# Patient Record
Sex: MALE | Race: BLACK OR AFRICAN AMERICAN
[De-identification: names, ages, dates, MRNs, and addresses within clinical notes are randomized per-mention and may not be internally consistent; named-entity substitution may affect disease eponyms.]

---

## 2018-04-10 ENCOUNTER — HOSPITAL ENCOUNTER (EMERGENCY)
Dept: HOSPITAL 62 - ER | Age: 38
Discharge: ANOTHER HOSPITAL | End: 2018-04-10
Payer: MEDICARE

## 2018-04-10 VITALS — DIASTOLIC BLOOD PRESSURE: 89 MMHG | SYSTOLIC BLOOD PRESSURE: 142 MMHG

## 2018-04-10 DIAGNOSIS — S42.021A: ICD-10-CM

## 2018-04-10 DIAGNOSIS — S22.41XA: ICD-10-CM

## 2018-04-10 DIAGNOSIS — J45.901: ICD-10-CM

## 2018-04-10 DIAGNOSIS — J93.9: Primary | ICD-10-CM
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
## 2018-04-10 DIAGNOSIS — F41.0: ICD-10-CM

## 2018-04-10 DIAGNOSIS — S27.321A: ICD-10-CM

## 2018-04-10 LAB
ADD MANUAL DIFF: NO
ALBUMIN SERPL-MCNC: 4 G/DL (ref 3.5–5)
ALP SERPL-CCNC: 49 U/L (ref 38–126)
ALT SERPL-CCNC: 28 U/L (ref 21–72)
ANION GAP SERPL CALC-SCNC: 10 MMOL/L (ref 5–19)
APPEARANCE UR: CLEAR
APTT PPP: (no result) S
ARTERIAL BLOOD FIO2: (no result)
ARTERIAL BLOOD H2CO3: 1.58 MMOL/L (ref 1.05–1.35)
ARTERIAL BLOOD HCO3: 28.4 MMOL/L (ref 20–26)
ARTERIAL BLOOD PCO2: 52.4 MMHG (ref 35–45)
ARTERIAL BLOOD PH: 7.35 (ref 7.35–7.45)
ARTERIAL BLOOD PO2: 59.2 MMHG (ref 80–100)
ARTERIAL BLOOD TOTAL CO2: 30 MMOL/L (ref 23–27)
AST SERPL-CCNC: 28 U/L (ref 17–59)
BASE EXCESS BLDA CALC-SCNC: 2 MMOL/L
BASOPHILS # BLD AUTO: 0.1 10^3/UL (ref 0–0.2)
BASOPHILS NFR BLD AUTO: 0.7 % (ref 0–2)
BILIRUB DIRECT SERPL-MCNC: 0.1 MG/DL (ref 0–0.4)
BILIRUB SERPL-MCNC: 0.1 MG/DL (ref 0.2–1.3)
BILIRUB UR QL STRIP: NEGATIVE
BUN SERPL-MCNC: 18 MG/DL (ref 7–20)
CALCIUM: 8.4 MG/DL (ref 8.4–10.2)
CHLORIDE SERPL-SCNC: 104 MMOL/L (ref 98–107)
CO2 SERPL-SCNC: 28 MMOL/L (ref 22–30)
EOSINOPHIL # BLD AUTO: 1.3 10^3/UL (ref 0–0.6)
EOSINOPHIL NFR BLD AUTO: 13.3 % (ref 0–6)
ERYTHROCYTE [DISTWIDTH] IN BLOOD BY AUTOMATED COUNT: 14.4 % (ref 11.5–14)
GLUCOSE SERPL-MCNC: 134 MG/DL (ref 75–110)
GLUCOSE UR STRIP-MCNC: NEGATIVE MG/DL
HCT VFR BLD CALC: 37.3 % (ref 37.9–51)
HGB BLD-MCNC: 12 G/DL (ref 13.5–17)
KETONES UR STRIP-MCNC: NEGATIVE MG/DL
LYMPHOCYTES # BLD AUTO: 3.6 10^3/UL (ref 0.5–4.7)
LYMPHOCYTES NFR BLD AUTO: 36.1 % (ref 13–45)
MCH RBC QN AUTO: 27 PG (ref 27–33.4)
MCHC RBC AUTO-ENTMCNC: 32.1 G/DL (ref 32–36)
MCV RBC AUTO: 84 FL (ref 80–97)
MONOCYTES # BLD AUTO: 0.8 10^3/UL (ref 0.1–1.4)
MONOCYTES NFR BLD AUTO: 8.1 % (ref 3–13)
NEUTROPHILS # BLD AUTO: 4.1 10^3/UL (ref 1.7–8.2)
NEUTS SEG NFR BLD AUTO: 41.8 % (ref 42–78)
NITRITE UR QL STRIP: NEGATIVE
PH UR STRIP: 5 [PH] (ref 5–9)
PLATELET # BLD: 234 10^3/UL (ref 150–450)
POTASSIUM SERPL-SCNC: 3.8 MMOL/L (ref 3.6–5)
PROT SERPL-MCNC: 6.5 G/DL (ref 6.3–8.2)
PROT UR STRIP-MCNC: NEGATIVE MG/DL
RBC # BLD AUTO: 4.44 10^6/UL (ref 4.35–5.55)
SAO2 % BLDA: 89.1 % (ref 94–98)
SODIUM SERPL-SCNC: 142.1 MMOL/L (ref 137–145)
SP GR UR STRIP: 1.02
TOTAL CELLS COUNTED % (AUTO): 100 %
UROBILINOGEN UR-MCNC: NEGATIVE MG/DL (ref ?–2)
WBC # BLD AUTO: 9.8 10^3/UL (ref 4–10.5)

## 2018-04-10 PROCEDURE — 0W9900Z DRAINAGE OF RIGHT PLEURAL CAVITY WITH DRAINAGE DEVICE, OPEN APPROACH: ICD-10-PCS | Performed by: INTERNAL MEDICINE

## 2018-04-10 PROCEDURE — 99291 CRITICAL CARE FIRST HOUR: CPT

## 2018-04-10 PROCEDURE — 80053 COMPREHEN METABOLIC PANEL: CPT

## 2018-04-10 PROCEDURE — 82803 BLOOD GASES ANY COMBINATION: CPT

## 2018-04-10 PROCEDURE — 85025 COMPLETE CBC W/AUTO DIFF WBC: CPT

## 2018-04-10 PROCEDURE — 70486 CT MAXILLOFACIAL W/O DYE: CPT

## 2018-04-10 PROCEDURE — 96375 TX/PRO/DX INJ NEW DRUG ADDON: CPT

## 2018-04-10 PROCEDURE — 96376 TX/PRO/DX INJ SAME DRUG ADON: CPT

## 2018-04-10 PROCEDURE — 94640 AIRWAY INHALATION TREATMENT: CPT

## 2018-04-10 PROCEDURE — 71260 CT THORAX DX C+: CPT

## 2018-04-10 PROCEDURE — 93010 ELECTROCARDIOGRAM REPORT: CPT

## 2018-04-10 PROCEDURE — 81001 URINALYSIS AUTO W/SCOPE: CPT

## 2018-04-10 PROCEDURE — 36600 WITHDRAWAL OF ARTERIAL BLOOD: CPT

## 2018-04-10 PROCEDURE — 72125 CT NECK SPINE W/O DYE: CPT

## 2018-04-10 PROCEDURE — 70450 CT HEAD/BRAIN W/O DYE: CPT

## 2018-04-10 PROCEDURE — 71045 X-RAY EXAM CHEST 1 VIEW: CPT

## 2018-04-10 PROCEDURE — 93005 ELECTROCARDIOGRAM TRACING: CPT

## 2018-04-10 PROCEDURE — 32551 INSERTION OF CHEST TUBE: CPT

## 2018-04-10 PROCEDURE — 74177 CT ABD & PELVIS W/CONTRAST: CPT

## 2018-04-10 PROCEDURE — 80307 DRUG TEST PRSMV CHEM ANLYZR: CPT

## 2018-04-10 PROCEDURE — 96361 HYDRATE IV INFUSION ADD-ON: CPT

## 2018-04-10 PROCEDURE — 96374 THER/PROPH/DIAG INJ IV PUSH: CPT

## 2018-04-10 PROCEDURE — 36415 COLL VENOUS BLD VENIPUNCTURE: CPT
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
## 2018-04-10 NOTE — RADIOLOGY REPORT (SQ)
EXAM DESCRIPTION:  CHEST SINGLE VIEW



COMPLETED DATE/TIME:  4/10/2018 7:52 pm



REASON FOR STUDY:  difficulty breathing



COMPARISON:  None.



EXAM PARAMETERS:  NUMBER OF VIEWS: One view.

TECHNIQUE: Single frontal radiographic view of the chest acquired.

RADIATION DOSE: NA

LIMITATIONS: None.



FINDINGS:  LUNGS AND PLEURA: Right-sided pneumothorax, approximately 30%.  Left lung clear.

MEDIASTINUM AND HILAR STRUCTURES: No masses.  Contour normal.

HEART AND VASCULAR STRUCTURES: Heart normal in size.  Normal vasculature.

BONES: Fracture of the right clavicle.

HARDWARE: None in the chest.

OTHER: No other significant finding.



IMPRESSION:  RIGHT-SIDED PNEUMOTHORAX.  FRACTURE OF THE RIGHT CLAVICLE.



COMMENT:   Pertinent findings on the imaging study reported as a CRITICAL RESULT to MICHELLE OTOOLE MD at20:12 on 4/10/2018.  The finding had been recognized and a chest tube inserted.

Category of Critical Result: Pneumothorax.



TECHNICAL DOCUMENTATION:  JOB ID:  [id number]

 2011 [employer]- All Rights Reserved



Reading location - IP/workstation name: [email or web address]
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
## 2018-04-10 NOTE — RADIOLOGY REPORT (SQ)
EXAM DESCRIPTION:  CHEST SINGLE VIEW



COMPLETED DATE/TIME:  4/10/2018 8:13 pm



REASON FOR STUDY:  CHEST TUBE PLACEMENT



COMPARISON:  4/10/2018.



EXAM PARAMETERS:  NUMBER OF VIEWS: One view.

TECHNIQUE: Single frontal radiographic view of the chest acquired.

RADIATION DOSE: NA

LIMITATIONS: None.



FINDINGS:  LUNGS AND PLEURA: Interval placement of right-sided chest tube with almost complete resolu
tion of the pneumothorax.  Minimal trace apical pneumothorax.  Left lung clear.

MEDIASTINUM AND HILAR STRUCTURES: No masses.  Contour normal.

HEART AND VASCULAR STRUCTURES: Heart normal in size.  Normal vasculature.

BONES: Fracture right clavicle.

HARDWARE: Right-sided chest tube.

OTHER: No other significant finding.



IMPRESSION:  INTERVAL PLACEMENT OF RIGHT-SIDED CHEST TUBE WITH ALMOST COMPLETE RESOLUTION OF THE PNEU
MOTHORAX.  MINIMAL TRACE APICAL PNEUMOTHORAX REMAINS.



TECHNICAL DOCUMENTATION:  JOB ID:  [id number]

 2011 [employer]- All Rights Reserved



Reading location - IP/workstation name: [email or web address]
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
## 2018-04-11 NOTE — EKG REPORT
SEVERITY:- ABNORMAL ECG -

SINUS RHYTHM

PROBABLE LEFT VENTRICULAR HYPERTROPHY

:

Confirmed by: Yosi Chance MD 11-Apr-2018 07:33:12

## 2018-04-16 ENCOUNTER — HOSPITAL ENCOUNTER (EMERGENCY)
Dept: HOSPITAL 62 - ER | Age: 38
Discharge: HOME | End: 2018-04-16
Payer: MEDICARE

## 2018-04-16 VITALS — SYSTOLIC BLOOD PRESSURE: 135 MMHG | DIASTOLIC BLOOD PRESSURE: 82 MMHG

## 2018-04-16 DIAGNOSIS — R06.02: ICD-10-CM

## 2018-04-16 DIAGNOSIS — Z86.14: ICD-10-CM

## 2018-04-16 DIAGNOSIS — J98.11: Primary | ICD-10-CM

## 2018-04-16 DIAGNOSIS — R06.00: ICD-10-CM

## 2018-04-16 DIAGNOSIS — Z98.890: ICD-10-CM

## 2018-04-16 LAB
ADD MANUAL DIFF: NO
ANION GAP SERPL CALC-SCNC: 10 MMOL/L (ref 5–19)
BASOPHILS # BLD AUTO: 0 10^3/UL (ref 0–0.2)
BASOPHILS NFR BLD AUTO: 0.6 % (ref 0–2)
BUN SERPL-MCNC: 20 MG/DL (ref 7–20)
CALCIUM: 9.6 MG/DL (ref 8.4–10.2)
CHLORIDE SERPL-SCNC: 99 MMOL/L (ref 98–107)
CO2 SERPL-SCNC: 33 MMOL/L (ref 22–30)
EOSINOPHIL # BLD AUTO: 0.9 10^3/UL (ref 0–0.6)
EOSINOPHIL NFR BLD AUTO: 15 % (ref 0–6)
ERYTHROCYTE [DISTWIDTH] IN BLOOD BY AUTOMATED COUNT: 14.3 % (ref 11.5–14)
GLUCOSE SERPL-MCNC: 127 MG/DL (ref 75–110)
HCT VFR BLD CALC: 37.9 % (ref 37.9–51)
HGB BLD-MCNC: 12.4 G/DL (ref 13.5–17)
LYMPHOCYTES # BLD AUTO: 1 10^3/UL (ref 0.5–4.7)
LYMPHOCYTES NFR BLD AUTO: 16.1 % (ref 13–45)
MCH RBC QN AUTO: 26.9 PG (ref 27–33.4)
MCHC RBC AUTO-ENTMCNC: 32.6 G/DL (ref 32–36)
MCV RBC AUTO: 83 FL (ref 80–97)
MONOCYTES # BLD AUTO: 0.4 10^3/UL (ref 0.1–1.4)
MONOCYTES NFR BLD AUTO: 6.9 % (ref 3–13)
NEUTROPHILS # BLD AUTO: 3.8 10^3/UL (ref 1.7–8.2)
NEUTS SEG NFR BLD AUTO: 61.4 % (ref 42–78)
PLATELET # BLD: 358 10^3/UL (ref 150–450)
POTASSIUM SERPL-SCNC: 4.3 MMOL/L (ref 3.6–5)
RBC # BLD AUTO: 4.59 10^6/UL (ref 4.35–5.55)
SODIUM SERPL-SCNC: 141.7 MMOL/L (ref 137–145)
TOTAL CELLS COUNTED % (AUTO): 100 %
WBC # BLD AUTO: 6.1 10^3/UL (ref 4–10.5)

## 2018-04-16 PROCEDURE — 99284 EMERGENCY DEPT VISIT MOD MDM: CPT

## 2018-04-16 PROCEDURE — 85025 COMPLETE CBC W/AUTO DIFF WBC: CPT

## 2018-04-16 PROCEDURE — 71046 X-RAY EXAM CHEST 2 VIEWS: CPT

## 2018-04-16 PROCEDURE — 96374 THER/PROPH/DIAG INJ IV PUSH: CPT

## 2018-04-16 PROCEDURE — 94640 AIRWAY INHALATION TREATMENT: CPT

## 2018-04-16 PROCEDURE — 80048 BASIC METABOLIC PNL TOTAL CA: CPT

## 2018-04-16 PROCEDURE — 71260 CT THORAX DX C+: CPT

## 2018-04-16 PROCEDURE — 36415 COLL VENOUS BLD VENIPUNCTURE: CPT

## 2018-04-16 PROCEDURE — 96372 THER/PROPH/DIAG INJ SC/IM: CPT

## 2018-04-16 NOTE — RADIOLOGY REPORT (SQ)
EXAM DESCRIPTION:  CHEST 2 VIEWS



COMPLETED DATE/TIME:  4/16/2018 1:43 pm



REASON FOR STUDY:  cp/recent chest tube



COMPARISON:  Two-view chest 7/5/2013, 6/22/2013



EXAM PARAMETERS:  NUMBER OF VIEWS: two views

TECHNIQUE: Digital Frontal and Lateral radiographic views of the chest acquired.

RADIATION DOSE: NA

LIMITATIONS: none



FINDINGS:  LUNGS AND PLEURA: There is right lower lobe partial collapse with volume loss and consolid
ation, elevation of the right hemidiaphragm.

Right middle lobe, right upper lobe clear.  Left lung clear.

No pneumothorax

MEDIASTINUM AND HILAR STRUCTURES: No masses or contour abnormalities.

HEART AND VASCULAR STRUCTURES: Heart normal size.  No evidence for failure.

BONES: No acute findings.

HARDWARE: None in the chest.

OTHER: No other significant finding.



IMPRESSION:  Subtotal collapse of the right lower lobe.  Pneumonia could not be excluded.  Endobronch
ial lesion to the left lower lobe could not be excluded.



TECHNICAL DOCUMENTATION:  JOB ID:  [id number]

 2011 [employer]- All Rights Reserved



Reading location - IP/workstation name: [email or web address]
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
## 2018-06-07 ENCOUNTER — HOSPITAL ENCOUNTER (EMERGENCY)
Dept: HOSPITAL 62 - ER | Age: 38
LOS: 1 days | Discharge: HOME | End: 2018-06-08
Payer: MEDICARE

## 2018-06-07 DIAGNOSIS — R06.02: ICD-10-CM

## 2018-06-07 DIAGNOSIS — J45.41: Primary | ICD-10-CM

## 2018-06-07 DIAGNOSIS — R05: ICD-10-CM

## 2018-06-07 PROCEDURE — 71045 X-RAY EXAM CHEST 1 VIEW: CPT

## 2018-06-07 PROCEDURE — 96365 THER/PROPH/DIAG IV INF INIT: CPT

## 2018-06-07 PROCEDURE — 94640 AIRWAY INHALATION TREATMENT: CPT

## 2018-06-07 PROCEDURE — 99285 EMERGENCY DEPT VISIT HI MDM: CPT

## 2018-06-07 PROCEDURE — 96375 TX/PRO/DX INJ NEW DRUG ADDON: CPT

## 2018-06-07 NOTE — RADIOLOGY REPORT (SQ)
EXAM DESCRIPTION:

XR CHEST 1 VIEW



CLINICAL HISTORY:

37 years Male, sob



COMPARISON:

CT,4.16.18.



NUMBER OF VIEWS/TECHNIQUE:

1/AP



FINDINGS: Increased lung volume, clear parenchyma, normal cardiac

silhouette, and chronic fracture of the right mid clavicle and

right upper posterolateral ribs.



IMPRESSION:



No acute cardiopulmonary findings.
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
## 2018-06-08 VITALS — DIASTOLIC BLOOD PRESSURE: 82 MMHG | SYSTOLIC BLOOD PRESSURE: 156 MMHG

## 2018-06-30 ENCOUNTER — HOSPITAL ENCOUNTER (INPATIENT)
Dept: HOSPITAL 62 - ER | Age: 38
LOS: 1 days | Discharge: HOME | DRG: 202 | End: 2018-07-01
Attending: INTERNAL MEDICINE | Admitting: INTERNAL MEDICINE
Payer: MEDICARE

## 2018-06-30 DIAGNOSIS — Z87.81: ICD-10-CM

## 2018-06-30 DIAGNOSIS — J96.02: ICD-10-CM

## 2018-06-30 DIAGNOSIS — J96.01: ICD-10-CM

## 2018-06-30 DIAGNOSIS — E87.2: ICD-10-CM

## 2018-06-30 DIAGNOSIS — J45.21: Primary | ICD-10-CM

## 2018-06-30 DIAGNOSIS — Z87.828: ICD-10-CM

## 2018-06-30 DIAGNOSIS — F41.9: ICD-10-CM

## 2018-06-30 DIAGNOSIS — Z82.49: ICD-10-CM

## 2018-06-30 LAB
ADD MANUAL DIFF: NO
ALBUMIN SERPL-MCNC: 4.5 G/DL (ref 3.5–5)
ALP SERPL-CCNC: 88 U/L (ref 38–126)
ALT SERPL-CCNC: 26 U/L (ref 21–72)
ANION GAP SERPL CALC-SCNC: 15 MMOL/L (ref 5–19)
AST SERPL-CCNC: 26 U/L (ref 17–59)
BASE EXCESS BLDV CALC-SCNC: -1.8 MMOL/L
BASE EXCESS BLDV CALC-SCNC: 2 MMOL/L
BASOPHILS # BLD AUTO: 0.1 10^3/UL (ref 0–0.2)
BASOPHILS NFR BLD AUTO: 1.1 % (ref 0–2)
BILIRUB DIRECT SERPL-MCNC: 0.3 MG/DL (ref 0–0.4)
BILIRUB SERPL-MCNC: 0.3 MG/DL (ref 0.2–1.3)
BUN SERPL-MCNC: 16 MG/DL (ref 7–20)
CALCIUM: 9.5 MG/DL (ref 8.4–10.2)
CHLORIDE SERPL-SCNC: 105 MMOL/L (ref 98–107)
CK SERPL-CCNC: 296 U/L (ref 55–170)
CO2 SERPL-SCNC: 30 MMOL/L (ref 22–30)
EOSINOPHIL # BLD AUTO: 1 10^3/UL (ref 0–0.6)
EOSINOPHIL NFR BLD AUTO: 9.4 % (ref 0–6)
ERYTHROCYTE [DISTWIDTH] IN BLOOD BY AUTOMATED COUNT: 15.9 % (ref 11.5–14)
GLUCOSE SERPL-MCNC: 136 MG/DL (ref 75–110)
HCO3 BLDV-SCNC: 29.5 MMOL/L (ref 20–32)
HCO3 BLDV-SCNC: 30.8 MMOL/L (ref 20–32)
HCT VFR BLD CALC: 40.1 % (ref 37.9–51)
HGB BLD-MCNC: 12.8 G/DL (ref 13.5–17)
LYMPHOCYTES # BLD AUTO: 3.2 10^3/UL (ref 0.5–4.7)
LYMPHOCYTES NFR BLD AUTO: 29.5 % (ref 13–45)
MCH RBC QN AUTO: 26.8 PG (ref 27–33.4)
MCHC RBC AUTO-ENTMCNC: 32.1 G/DL (ref 32–36)
MCV RBC AUTO: 84 FL (ref 80–97)
MONOCYTES # BLD AUTO: 0.7 10^3/UL (ref 0.1–1.4)
MONOCYTES NFR BLD AUTO: 6.8 % (ref 3–13)
NEUTROPHILS # BLD AUTO: 5.7 10^3/UL (ref 1.7–8.2)
NEUTS SEG NFR BLD AUTO: 53.2 % (ref 42–78)
PCO2 BLDV: 101.9 MMHG (ref 35–63)
PCO2 BLDV: 59.9 MMHG (ref 35–63)
PH BLDV: 7.1 [PH] (ref 7.3–7.42)
PH BLDV: 7.31 [PH] (ref 7.3–7.42)
PLATELET # BLD: 353 10^3/UL (ref 150–450)
POTASSIUM SERPL-SCNC: 4.1 MMOL/L (ref 3.6–5)
PROT SERPL-MCNC: 7.7 G/DL (ref 6.3–8.2)
RBC # BLD AUTO: 4.79 10^6/UL (ref 4.35–5.55)
SODIUM SERPL-SCNC: 150 MMOL/L (ref 137–145)
TOTAL CELLS COUNTED % (AUTO): 100 %
WBC # BLD AUTO: 10.7 10^3/UL (ref 4–10.5)

## 2018-06-30 PROCEDURE — 82962 GLUCOSE BLOOD TEST: CPT

## 2018-06-30 PROCEDURE — 82550 ASSAY OF CK (CPK): CPT

## 2018-06-30 PROCEDURE — 94660 CPAP INITIATION&MGMT: CPT

## 2018-06-30 PROCEDURE — 99291 CRITICAL CARE FIRST HOUR: CPT

## 2018-06-30 PROCEDURE — 71045 X-RAY EXAM CHEST 1 VIEW: CPT

## 2018-06-30 PROCEDURE — 93010 ELECTROCARDIOGRAM REPORT: CPT

## 2018-06-30 PROCEDURE — 36415 COLL VENOUS BLD VENIPUNCTURE: CPT

## 2018-06-30 PROCEDURE — 5A09457 ASSISTANCE WITH RESPIRATORY VENTILATION, 24-96 CONSECUTIVE HOURS, CONTINUOUS POSITIVE AIRWAY PRESSURE: ICD-10-PCS | Performed by: EMERGENCY MEDICINE

## 2018-06-30 PROCEDURE — 80053 COMPREHEN METABOLIC PANEL: CPT

## 2018-06-30 PROCEDURE — 82803 BLOOD GASES ANY COMBINATION: CPT

## 2018-06-30 PROCEDURE — 93005 ELECTROCARDIOGRAM TRACING: CPT

## 2018-06-30 PROCEDURE — 85025 COMPLETE CBC W/AUTO DIFF WBC: CPT

## 2018-06-30 PROCEDURE — 84484 ASSAY OF TROPONIN QUANT: CPT

## 2018-06-30 RX ADMIN — OXYCODONE AND ACETAMINOPHEN PRN TAB: 5; 325 TABLET ORAL at 23:57

## 2018-06-30 RX ADMIN — ACETAMINOPHEN PRN MG: 325 TABLET ORAL at 15:55

## 2018-06-30 RX ADMIN — METHYLPREDNISOLONE SODIUM SUCCINATE SCH MG: 125 INJECTION, POWDER, FOR SOLUTION INTRAMUSCULAR; INTRAVENOUS at 17:30

## 2018-06-30 RX ADMIN — ACETAMINOPHEN PRN MG: 325 TABLET ORAL at 11:16

## 2018-06-30 RX ADMIN — OXYCODONE HYDROCHLORIDE PRN MG: 5 TABLET ORAL at 17:32

## 2018-06-30 RX ADMIN — IPRATROPIUM BROMIDE AND ALBUTEROL SULFATE SCH ML: 2.5; .5 SOLUTION RESPIRATORY (INHALATION) at 07:48

## 2018-06-30 RX ADMIN — IPRATROPIUM BROMIDE AND ALBUTEROL SULFATE SCH ML: 2.5; .5 SOLUTION RESPIRATORY (INHALATION) at 19:51

## 2018-06-30 RX ADMIN — ENOXAPARIN SODIUM SCH MG: 40 INJECTION SUBCUTANEOUS at 11:13

## 2018-06-30 RX ADMIN — OXYCODONE HYDROCHLORIDE PRN MG: 5 TABLET ORAL at 23:57

## 2018-06-30 RX ADMIN — MAGNESIUM SULFATE IN DEXTROSE SCH ML: 10 INJECTION, SOLUTION INTRAVENOUS at 09:46

## 2018-06-30 RX ADMIN — IPRATROPIUM BROMIDE AND ALBUTEROL SULFATE SCH ML: 2.5; .5 SOLUTION RESPIRATORY (INHALATION) at 15:33

## 2018-06-30 RX ADMIN — IPRATROPIUM BROMIDE AND ALBUTEROL SULFATE SCH ML: 2.5; .5 SOLUTION RESPIRATORY (INHALATION) at 11:30

## 2018-06-30 RX ADMIN — LANSOPRAZOLE SCH MG: 30 TABLET, ORALLY DISINTEGRATING, DELAYED RELEASE ORAL at 11:13

## 2018-06-30 RX ADMIN — OXYCODONE AND ACETAMINOPHEN PRN TAB: 5; 325 TABLET ORAL at 17:30

## 2018-06-30 RX ADMIN — MAGNESIUM SULFATE IN DEXTROSE SCH ML: 10 INJECTION, SOLUTION INTRAVENOUS at 09:47
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
## 2018-06-30 NOTE — RADIOLOGY REPORT (SQ)
EXAM DESCRIPTION: 



XR CHEST 1 VIEW



COMPLETED DATE/TME:  06/30/2018 04:38



CLINICAL HISTORY: SOB



COMPARISON: 4/10/2018



FINDINGS: Single frontal view of the chest.  The

cardiomediastinal silhouette has normal size and contour. No

consolidation, pneumothorax, or pleural effusion.  Redemonstrated

mid right clavicular fracture. Leads overlie the chest. Upper

abdominal soft tissues are unremarkable.



IMPRESSION:



1. No acute pulmonary process identified.

## 2018-07-01 VITALS — DIASTOLIC BLOOD PRESSURE: 74 MMHG | SYSTOLIC BLOOD PRESSURE: 127 MMHG

## 2018-07-01 RX ADMIN — METHYLPREDNISOLONE SODIUM SUCCINATE SCH MG: 125 INJECTION, POWDER, FOR SOLUTION INTRAMUSCULAR; INTRAVENOUS at 02:41

## 2018-07-01 RX ADMIN — ENOXAPARIN SODIUM SCH MG: 40 INJECTION SUBCUTANEOUS at 10:47

## 2018-07-01 RX ADMIN — IPRATROPIUM BROMIDE AND ALBUTEROL SULFATE SCH ML: 2.5; .5 SOLUTION RESPIRATORY (INHALATION) at 00:21

## 2018-07-01 RX ADMIN — IPRATROPIUM BROMIDE AND ALBUTEROL SULFATE SCH ML: 2.5; .5 SOLUTION RESPIRATORY (INHALATION) at 04:35

## 2018-07-01 RX ADMIN — IPRATROPIUM BROMIDE AND ALBUTEROL SULFATE SCH ML: 2.5; .5 SOLUTION RESPIRATORY (INHALATION) at 08:02

## 2018-07-01 RX ADMIN — OXYCODONE HYDROCHLORIDE PRN MG: 5 TABLET ORAL at 08:47

## 2018-07-01 RX ADMIN — LANSOPRAZOLE SCH MG: 30 TABLET, ORALLY DISINTEGRATING, DELAYED RELEASE ORAL at 05:42
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
## 2018-08-22 ENCOUNTER — HOSPITAL ENCOUNTER (EMERGENCY)
Dept: HOSPITAL 62 - ER | Age: 38
Discharge: HOME | End: 2018-08-22
Payer: MEDICARE

## 2018-08-22 VITALS — DIASTOLIC BLOOD PRESSURE: 84 MMHG | SYSTOLIC BLOOD PRESSURE: 152 MMHG

## 2018-08-22 DIAGNOSIS — J45.21: Primary | ICD-10-CM

## 2018-08-22 DIAGNOSIS — Z87.891: ICD-10-CM

## 2018-08-22 LAB
ADD MANUAL DIFF: NO
ANION GAP SERPL CALC-SCNC: 10 MMOL/L (ref 5–19)
BASOPHILS # BLD AUTO: 0.1 10^3/UL (ref 0–0.2)
BASOPHILS NFR BLD AUTO: 1.1 % (ref 0–2)
BUN SERPL-MCNC: 16 MG/DL (ref 7–20)
CALCIUM: 9 MG/DL (ref 8.4–10.2)
CHLORIDE SERPL-SCNC: 108 MMOL/L (ref 98–107)
CO2 SERPL-SCNC: 28 MMOL/L (ref 22–30)
EOSINOPHIL # BLD AUTO: 1.3 10^3/UL (ref 0–0.6)
EOSINOPHIL NFR BLD AUTO: 19.7 % (ref 0–6)
ERYTHROCYTE [DISTWIDTH] IN BLOOD BY AUTOMATED COUNT: 15.3 % (ref 11.5–14)
GLUCOSE SERPL-MCNC: 111 MG/DL (ref 75–110)
HCT VFR BLD CALC: 37.5 % (ref 37.9–51)
HGB BLD-MCNC: 12.2 G/DL (ref 13.5–17)
LYMPHOCYTES # BLD AUTO: 1.5 10^3/UL (ref 0.5–4.7)
LYMPHOCYTES NFR BLD AUTO: 23.3 % (ref 13–45)
MCH RBC QN AUTO: 26.4 PG (ref 27–33.4)
MCHC RBC AUTO-ENTMCNC: 32.6 G/DL (ref 32–36)
MCV RBC AUTO: 81 FL (ref 80–97)
MONOCYTES # BLD AUTO: 0.5 10^3/UL (ref 0.1–1.4)
MONOCYTES NFR BLD AUTO: 7.2 % (ref 3–13)
NEUTROPHILS # BLD AUTO: 3.2 10^3/UL (ref 1.7–8.2)
NEUTS SEG NFR BLD AUTO: 48.7 % (ref 42–78)
PLATELET # BLD: 286 10^3/UL (ref 150–450)
POTASSIUM SERPL-SCNC: 3.8 MMOL/L (ref 3.6–5)
RBC # BLD AUTO: 4.62 10^6/UL (ref 4.35–5.55)
SODIUM SERPL-SCNC: 145.8 MMOL/L (ref 137–145)
TOTAL CELLS COUNTED % (AUTO): 100 %
WBC # BLD AUTO: 6.6 10^3/UL (ref 4–10.5)

## 2018-08-22 PROCEDURE — 96366 THER/PROPH/DIAG IV INF ADDON: CPT

## 2018-08-22 PROCEDURE — 36415 COLL VENOUS BLD VENIPUNCTURE: CPT

## 2018-08-22 PROCEDURE — 94640 AIRWAY INHALATION TREATMENT: CPT

## 2018-08-22 PROCEDURE — 96375 TX/PRO/DX INJ NEW DRUG ADDON: CPT

## 2018-08-22 PROCEDURE — 96365 THER/PROPH/DIAG IV INF INIT: CPT

## 2018-08-22 PROCEDURE — 85025 COMPLETE CBC W/AUTO DIFF WBC: CPT

## 2018-08-22 PROCEDURE — 99285 EMERGENCY DEPT VISIT HI MDM: CPT

## 2018-08-22 PROCEDURE — 80048 BASIC METABOLIC PNL TOTAL CA: CPT

## 2018-08-22 PROCEDURE — 71045 X-RAY EXAM CHEST 1 VIEW: CPT

## 2018-08-22 RX ADMIN — MAGNESIUM SULFATE IN DEXTROSE SCH MLS/HR: 10 INJECTION, SOLUTION INTRAVENOUS at 01:16

## 2018-08-22 RX ADMIN — MAGNESIUM SULFATE IN DEXTROSE SCH MLS/HR: 10 INJECTION, SOLUTION INTRAVENOUS at 02:15

## 2018-08-22 NOTE — ER DOCUMENT REPORT
ED Medical Screen (RME)





- General


Chief Complaint: Asthma Exacerbation


Stated Complaint: ASTHMA


Time Seen by Provider: 08/22/18 01:06


Mode of Arrival: Wheelchair


Information source: Patient, Relative


Notes: 





Patient is a 29-year-old male who presents with chief complaint of acute asthma 

exacerbation.  Patient reports this is been going on several days, rescue 

inhalers have not helped.  Patient speaking in short choppy sentences.  Patient 

upgraded to SAE 2.





Exam:


Inspiratory and expiratory wheezing throughout, diminished air movement.





I have greeted and performed a rapid initial assessment of this patient.  A 

comprehensive ED assessment and evaluation of the patient, analysis of test 

results and completion of the medical decision making process will be conducted 

by additional ED providers.  Dictation of this chart was performed using voice 

recognition software; therefore, there may be some unintended grammatical 

errors.


TRAVEL OUTSIDE OF THE [place] IN LAST 30 DAYS: No





- Related Data


Allergies/Adverse Reactions: 


 





tramadol Allergy (Verified 04/16/18 13:08)


 











Past Medical History


Pulmonary Medical History: Reports: Hx Asthma


   Denies: Hx Bronchitis, Hx COPD, Hx Pneumonia, Hx Intubation, Hx Respiratory 

Failure, Hx Sleep Apnea, Hx Tuberculosis


Neurological Medical History: Denies: Hx Cerebrovascular Accident, Hx Migraine, 

Hx Seizures


Endocrine Medical History: Denies: Hx Diabetes Mellitus Type 1, Hx Diabetes 

Mellitus Type 2, Hx Graves' Disease, Hx Hyperthyroidism, Hx Hypothyroidism


Renal/ Medical History: Denies: Hx Peritoneal Dialysis


Psychiatric Medical History: Reports: Hx Bipolar Disorder


Infectious Medical History: Reports: Hx MRSA


Past Surgical History: Reports: Hx Abdominal Surgery - umbilical hernia repair, 

Hx Orthopedic Surgery - R clavical; tibia





- Immunizations


Hx Diphtheria, Pertussis, Tetanus Vaccination: Yes


History of Influenza Vaccine for 10/2017 - 3/2018 Season: No

## 2018-08-22 NOTE — RADIOLOGY REPORT (SQ)
EXAM DESCRIPTION: 



XR CHEST 1 VIEW



COMPLETED DATE/TME:  08/22/2018 01:14



CLINICAL HISTORY: dyspnea



COMPARISON: 6/7/2018



FINDINGS: Single frontal view of the chest.  The

cardiomediastinal silhouette has normal size and contour. No

consolidation, pneumothorax, or pleural effusion.  No displaced

rib fractures identified. Leads overlie the chest. Upper

abdominal soft tissues are unremarkable.



IMPRESSION:



1. No acute pulmonary process identified.

## 2018-08-22 NOTE — ER DOCUMENT REPORT
ED General





- General


Chief Complaint: Asthma Exacerbation


Stated Complaint: ASTHMA


Time Seen by Provider: 08/22/18 01:06


Mode of Arrival: Wheelchair


Notes: 





Patient is a 37-year-old male who presents with complaint of asthma attack.  He 

has a history of severe asthma.  Has been using inhalers at home today but they 

have not been helping.  He has been intubated a few times in the past.  He is a 

former smoker but quit several years ago.  No fevers.  He says he does have 

some pain in the right side of his lung with breathing.  No history of PE or 

DVT.  No other complaints at this time.  No other medical problems.


TRAVEL OUTSIDE OF THE [place] IN LAST 30 DAYS: No





- Related Data


Allergies/Adverse Reactions: 


 





tramadol Allergy (Verified 04/16/18 13:08)


 











Past Medical History





- General


Information source: Patient, Relative





- Social History


Smoking Status: Former Smoker


Frequency of alcohol use: None


Drug Abuse: None


Family History: Hypertension, None, Reviewed & Not Pertinent


Pulmonary Medical History: Reports: Hx Asthma


   Denies: Hx Bronchitis, Hx COPD, Hx Pneumonia, Hx Intubation, Hx Respiratory 

Failure, Hx Sleep Apnea, Hx Tuberculosis


Neurological Medical History: Denies: Hx Cerebrovascular Accident, Hx Migraine, 

Hx Seizures


Endocrine Medical History: Denies: Hx Diabetes Mellitus Type 1, Hx Diabetes 

Mellitus Type 2, Hx Graves' Disease, Hx Hyperthyroidism, Hx Hypothyroidism


Renal/ Medical History: Denies: Hx Peritoneal Dialysis


Psychiatric Medical History: Reports: Hx Bipolar Disorder


Infectious Medical History: Reports: Hx MRSA


Past Surgical History: Reports: Hx Abdominal Surgery - umbilical hernia repair, 

Hx Orthopedic Surgery - R clavical; tibia





- Immunizations


Hx Diphtheria, Pertussis, Tetanus Vaccination: Yes


Hx Pneumococcal Vaccination: 10/01/11





Review of Systems





- Review of Systems


Notes: 





My Normal Review Basic





REVIEW OF SYSTEMS:


CONSTITUTIONAL :  Denies fever,  chills, or sweats.  Denies recent illness.


EENT:   Denies eye, ear, throat, or mouth pain or symptoms.  Denies nasal or 

sinus congestion.


CARDIOVASCULAR:  Denies chest pain.


RESPIRATORY: Difficulty to breathing.


GASTROINTESTINAL:  Denies abdominal pain.  Denies nausea, vomiting, or diarrhea.


GENITOURINARY:  Denies difficulty urinating, painful urination, burning, 

frequency, or blood in urine.


SKIN:   Denies rash or skin lesions.


NEUROLOGICAL:  Denies altered mental status or loss of consciousness.  Denies 

headache.  Denies weakness or paralysis or loss of use of either side.  Denies 

problems with gait or speech.  Denies sensory or motor loss.


ALL OTHER SYSTEMS REVIEWED AND NEGATIVE.





Physical Exam





- Vital signs


Vitals: 


 











Temp Pulse Resp BP Pulse Ox


 


 98.1 F   88   12   139/88 H  93 


 


 08/22/18 01:06  08/22/18 01:06  08/22/18 01:06  08/22/18 01:06  08/22/18 01:06














- Notes


Notes: 





General Appearance: Well nourished, alert, cooperative, moderate acute distress

, no obvious discomfort.


Vitals: reviewed, See vital signs table.


Head: no swelling or tenderness to the head


Eyes: PERRL, EOMI, Conjuctiva clear


Mouth: No decreasd moisture


Throat: No tonsillar inflammation, No airway obstruction,  No lymphadenopathy


Neck: Supple, no neck tenderness, No thyromegaly


Lungs:Diffuse wheezing with poor to fair air movement.  Some accessory muscle 

use.  Speaks in 2 word sentences.


Heart: Normal rate, Regular rythm, No murmur, no rub


Abdomen: Normal BS, soft, No rigidity, No abdominal tenderness, No guarding, no 

rebound,


Extremities: strength 5/5 in all extremities, good pulses in all extremities, 

no swelling or tenderness in the extremities, no edema.


Skin: warm, dry, appropriate color, no rash


Neuro: speech clear, oriented x 3, normal affect, responds appropriately to 

questions.





Course





- Re-evaluation


Re-evalutation: 





08/22/18 02:00


On reassessment patient's work of breathing is much improved however his lung 

fields are still very tight wheezing.  I will give him more breathing 

treatments and then reassess.


08/22/18 02:32








Patient is now moving air much better.  He still has a prolonged expiratory 

wheeze; however, his air movement is much improved.  We will continue to 

monitor the patient to make sure he fully improves.


08/22/18 05:39








Patient requested to leave because he has to work early morning.  I offered to 

write him a work note.  I informed him that for him to stay Nancy bit longer 

make sure his lung fields clear.  I informed him that his chance believes now 

that he could go home and his breathing to get worse and he could quickly crash 

being that he has just severe history of asthma.  Patient shows understanding 

of this but says he feels very well and request to be discharged home.  He is 

currently speaking full sentences.  His lung fields to continue to clear.  He 

has good air movement.  He still has some wheezing.  His oxygen saturation is 90

-93%.  Patient said this is actually his baseline.  I informed him that that 

does not really make sense that he would have a baseline oxygen saturation of 90

-93%; however, patient is adamant that this is his baseline oxygenation.  

Patient shows understanding that if he does go home he could potentially have 

worsening difficulty breathing and could quickly crash and require intubation 

or ventilator.  Patient is aware of this but still request to be discharged 

home.  Patient shows capacity to make his own decisions.  Patient will be 

discharged as he requests.





Dictation of this chart was performed using voice recognition software; 

therefore, there may be some unintended grammatical errors.





- Vital Signs


Vital signs: 


 











Temp Pulse Resp BP Pulse Ox


 


 98.1 F   88   12   152/84 H  94 


 


 08/22/18 01:06  08/22/18 01:06  08/22/18 03:01  08/22/18 03:01  08/22/18 03:01














- Laboratory


Result Diagrams: 


 08/22/18 01:18





 08/22/18 01:18


Laboratory results interpreted by me: 


 











  08/22/18 08/22/18





  01:18 01:18


 


Hgb  12.2 L 


 


Hct  37.5 L 


 


MCH  26.4 L 


 


RDW  15.3 H 


 


Eosinophils %  19.7 H 


 


Absolute Eosinophils  1.3 H 


 


Sodium   145.8 H


 


Chloride   108 H


 


Glucose   111 H














Discharge





- Discharge


Clinical Impression: 


Asthma exacerbation


Qualifiers:


 Asthma severity: unspecified severity Asthma persistence: intermittent 

Qualified Code(s): J45.21 - Mild intermittent asthma with (acute) exacerbation





Condition: Stable


Disposition: HOME, SELF-CARE


Additional Instructions: 


ASTHMA:





     You have been diagnosed as having asthma.  This is a condition where there 

is episodic tightness in the bronchial tubes.  Allergies, infections, and 

polluted or cold air may be contributing factors.


     Emergency treatment of a severe asthma attack may include adrenaline shots

, or bronchodilator aerosol.  You may feel lightheaded, have a decreased 

exercise tolerance and a rapid pulse for an hour or two.  Rest and get plenty 

of fluids.


     Home treatment of asthma requires bronchodilator drugs.  These can be 

administered by injection, inhalation, or by mouth.  Antibiotics and 

corticosteroids may be required for some patients.  You should avoid chemical 

fumes, dusts, pollens, and exercising in very cold or dry air. If you smoke, 

stop!!


     If you develop a fever, increased wheezing, chest pain, or severe 

shortness of breath, you should contact the doctor immediately.








STEROID MEDICATION:


     You have been given an injection of or oral medicine of the cortisone/

steroid class.  This medication is used to control inflammation or allergy.  

Vinod t is usually only given for a short period of time, until the acute process 

subsides.


     There are usually no side effects from short-term use of cortisone-like 

medications.  Some persons feel an increased sense of well-being and are not 

sleepy at bedtime.  Long-term use of cortisone medications is best avoided, 

unless required for a severe condition.  If your condition does not remit, or 

relapses after the course of corticosteroid medication, you should consult your 

physician.








INHALED BRONCHODILATORS:


     You have received treatment(s) of and/or prescription for an inhaled 

bronchodilator -- a medication which stimulates the airways in the lung to 

dilate.  This improves the flow of air in asthma, bronchitis, and emphysema.


     These medicines have some similarity to adrenaline, and can cause similar 

side effects:  shakiness, racing heart, and a sense of nervousness.  These side 

effects decrease with time.  Contact your doctor if these side effects are 

severe.


     Do not over-use the medicine.  Too-frequent use of the inhaler may make it 

ineffective.  Call your doctor if the inhaler is not controlling your symptoms 

at the prescribed doses.











FOLLOW-UP CARE:


If you have been referred to a physician for follow-up care, call the physician

s office for an appointment as you were instructed or within the next two days.

  If you experience worsening or a significant change in your symptoms, notify 

the physician immediately or return to the Emergency Department at any time for 

re-evaluation.





Please return to the ER immediately if you develop fevers, difficulty breathing

, wheezing not responding to your inhaler, or if you feel unwell. Do not take 

the Motrin if you are having an asthma exacerbation.  As discussed with you, we 

recommend staying a little bit longer to make sure you lungs do clear out well 

before leaving.  We understand that you have to work in the morning and that 

you do not want to stay.  We respect your decision to want to leave but 

strongly encourage you to return to the ER anytime if you have any worsening 

breathing whatsoever as we want what is best for you and we want you to do well.


Prescriptions: 


Ibuprofen [Motrin 400 mg Tablet] 400 mg PO Q6 PRN #20 tablet


 PRN Reason: Pain Scale Of 2


Ipratropium/Albuterol Sulfate [Duoneb 3 ml Ampul] 3 ml NEB RTQ4HP PRN #30 

vial.neb


 PRN Reason: 


Ipratropium/Albuterol Sulfate [Combivent Inhaler] 1 puff IH BID #1 aer.w.adap


Prednisone [Deltasone 20 mg Tablet] 3 tab PO DAILY 4 Days  tablet

## 2018-10-15 ENCOUNTER — HOSPITAL ENCOUNTER (INPATIENT)
Dept: HOSPITAL 62 - ER | Age: 38
LOS: 5 days | Discharge: ANOTHER HOSPITAL | DRG: 166 | End: 2018-10-20
Attending: INTERNAL MEDICINE | Admitting: INTERNAL MEDICINE
Payer: MEDICARE

## 2018-10-15 DIAGNOSIS — F31.9: ICD-10-CM

## 2018-10-15 DIAGNOSIS — F19.90: ICD-10-CM

## 2018-10-15 DIAGNOSIS — I10: ICD-10-CM

## 2018-10-15 DIAGNOSIS — Z78.1: ICD-10-CM

## 2018-10-15 DIAGNOSIS — J96.01: Primary | ICD-10-CM

## 2018-10-15 DIAGNOSIS — Z86.14: ICD-10-CM

## 2018-10-15 DIAGNOSIS — T81.82XA: ICD-10-CM

## 2018-10-15 DIAGNOSIS — Z82.49: ICD-10-CM

## 2018-10-15 DIAGNOSIS — J96.02: ICD-10-CM

## 2018-10-15 DIAGNOSIS — J45.51: ICD-10-CM

## 2018-10-15 DIAGNOSIS — J95.811: ICD-10-CM

## 2018-10-15 DIAGNOSIS — Z88.8: ICD-10-CM

## 2018-10-15 DIAGNOSIS — J18.9: ICD-10-CM

## 2018-10-15 DIAGNOSIS — F17.200: ICD-10-CM

## 2018-10-15 DIAGNOSIS — R00.0: ICD-10-CM

## 2018-10-15 LAB
ADD MANUAL DIFF: NO
ALBUMIN SERPL-MCNC: 3.9 G/DL (ref 3.5–5)
ALP SERPL-CCNC: 70 U/L (ref 38–126)
ALT SERPL-CCNC: 22 U/L (ref 21–72)
ANION GAP SERPL CALC-SCNC: 8 MMOL/L (ref 5–19)
APPEARANCE UR: (no result)
APTT PPP: YELLOW S
AST SERPL-CCNC: 25 U/L (ref 17–59)
BARBITURATES UR QL SCN: NEGATIVE
BASOPHILS # BLD AUTO: 0.1 10^3/UL (ref 0–0.2)
BASOPHILS NFR BLD AUTO: 1.1 % (ref 0–2)
BILIRUB DIRECT SERPL-MCNC: 0.2 MG/DL (ref 0–0.4)
BILIRUB SERPL-MCNC: 0.3 MG/DL (ref 0.2–1.3)
BILIRUB UR QL STRIP: NEGATIVE
BUN SERPL-MCNC: 12 MG/DL (ref 7–20)
CALCIUM: 8.8 MG/DL (ref 8.4–10.2)
CHLORIDE SERPL-SCNC: 106 MMOL/L (ref 98–107)
CO2 SERPL-SCNC: 28 MMOL/L (ref 22–30)
EOSINOPHIL # BLD AUTO: 0.9 10^3/UL (ref 0–0.6)
EOSINOPHIL NFR BLD AUTO: 11.1 % (ref 0–6)
ERYTHROCYTE [DISTWIDTH] IN BLOOD BY AUTOMATED COUNT: 15.6 % (ref 11.5–14)
GLUCOSE SERPL-MCNC: 155 MG/DL (ref 75–110)
GLUCOSE UR STRIP-MCNC: NEGATIVE MG/DL
HCT VFR BLD CALC: 36.5 % (ref 37.9–51)
HGB BLD-MCNC: 12.1 G/DL (ref 13.5–17)
KETONES UR STRIP-MCNC: NEGATIVE MG/DL
LYMPHOCYTES # BLD AUTO: 1.5 10^3/UL (ref 0.5–4.7)
LYMPHOCYTES NFR BLD AUTO: 19.4 % (ref 13–45)
MCH RBC QN AUTO: 26.8 PG (ref 27–33.4)
MCHC RBC AUTO-ENTMCNC: 33.1 G/DL (ref 32–36)
MCV RBC AUTO: 81 FL (ref 80–97)
METHADONE UR QL SCN: NEGATIVE
MONOCYTES # BLD AUTO: 0.6 10^3/UL (ref 0.1–1.4)
MONOCYTES NFR BLD AUTO: 7.4 % (ref 3–13)
NEUTROPHILS # BLD AUTO: 4.7 10^3/UL (ref 1.7–8.2)
NEUTS SEG NFR BLD AUTO: 61 % (ref 42–78)
NITRITE UR QL STRIP: NEGATIVE
PCP UR QL SCN: NEGATIVE
PH UR STRIP: 5 [PH] (ref 5–9)
PLATELET # BLD: 290 10^3/UL (ref 150–450)
POTASSIUM SERPL-SCNC: 4.1 MMOL/L (ref 3.6–5)
PROT SERPL-MCNC: 6.6 G/DL (ref 6.3–8.2)
PROT UR STRIP-MCNC: 30 MG/DL
RBC # BLD AUTO: 4.51 10^6/UL (ref 4.35–5.55)
SODIUM SERPL-SCNC: 141.5 MMOL/L (ref 137–145)
SP GR UR STRIP: 1.01
TOTAL CELLS COUNTED % (AUTO): 100 %
URINE AMPHETAMINES SCREEN: NEGATIVE
URINE BENZODIAZEPINES SCREEN: NEGATIVE
URINE COCAINE SCREEN: (no result)
URINE MARIJUANA (THC) SCREEN: (no result)
UROBILINOGEN UR-MCNC: NEGATIVE MG/DL (ref ?–2)
WBC # BLD AUTO: 7.7 10^3/UL (ref 4–10.5)

## 2018-10-15 PROCEDURE — 94002 VENT MGMT INPAT INIT DAY: CPT

## 2018-10-15 PROCEDURE — 71045 X-RAY EXAM CHEST 1 VIEW: CPT

## 2018-10-15 PROCEDURE — 82962 GLUCOSE BLOOD TEST: CPT

## 2018-10-15 PROCEDURE — 94003 VENT MGMT INPAT SUBQ DAY: CPT

## 2018-10-15 PROCEDURE — 84484 ASSAY OF TROPONIN QUANT: CPT

## 2018-10-15 PROCEDURE — 87070 CULTURE OTHR SPECIMN AEROBIC: CPT

## 2018-10-15 PROCEDURE — 93010 ELECTROCARDIOGRAM REPORT: CPT

## 2018-10-15 PROCEDURE — 96375 TX/PRO/DX INJ NEW DRUG ADDON: CPT

## 2018-10-15 PROCEDURE — 80048 BASIC METABOLIC PNL TOTAL CA: CPT

## 2018-10-15 PROCEDURE — 96361 HYDRATE IV INFUSION ADD-ON: CPT

## 2018-10-15 PROCEDURE — 87205 SMEAR GRAM STAIN: CPT

## 2018-10-15 PROCEDURE — 96374 THER/PROPH/DIAG INJ IV PUSH: CPT

## 2018-10-15 PROCEDURE — 80307 DRUG TEST PRSMV CHEM ANLYZR: CPT

## 2018-10-15 PROCEDURE — 36415 COLL VENOUS BLD VENIPUNCTURE: CPT

## 2018-10-15 PROCEDURE — 93005 ELECTROCARDIOGRAM TRACING: CPT

## 2018-10-15 PROCEDURE — 81001 URINALYSIS AUTO W/SCOPE: CPT

## 2018-10-15 PROCEDURE — 93306 TTE W/DOPPLER COMPLETE: CPT

## 2018-10-15 PROCEDURE — 99291 CRITICAL CARE FIRST HOUR: CPT

## 2018-10-15 PROCEDURE — 85027 COMPLETE CBC AUTOMATED: CPT

## 2018-10-15 PROCEDURE — 82550 ASSAY OF CK (CPK): CPT

## 2018-10-15 PROCEDURE — 83880 ASSAY OF NATRIURETIC PEPTIDE: CPT

## 2018-10-15 PROCEDURE — S0164 INJECTION PANTROPRAZOLE: HCPCS

## 2018-10-15 PROCEDURE — 85025 COMPLETE CBC W/AUTO DIFF WBC: CPT

## 2018-10-15 PROCEDURE — 82553 CREATINE MB FRACTION: CPT

## 2018-10-15 PROCEDURE — 94668 MNPJ CHEST WALL SBSQ: CPT

## 2018-10-15 PROCEDURE — 83735 ASSAY OF MAGNESIUM: CPT

## 2018-10-15 PROCEDURE — 80053 COMPREHEN METABOLIC PANEL: CPT

## 2018-10-15 PROCEDURE — 94640 AIRWAY INHALATION TREATMENT: CPT

## 2018-10-15 PROCEDURE — 71275 CT ANGIOGRAPHY CHEST: CPT

## 2018-10-15 PROCEDURE — 71250 CT THORAX DX C-: CPT

## 2018-10-15 PROCEDURE — 0BH17EZ INSERTION OF ENDOTRACHEAL AIRWAY INTO TRACHEA, VIA NATURAL OR ARTIFICIAL OPENING: ICD-10-PCS

## 2018-10-15 PROCEDURE — 5A1955Z RESPIRATORY VENTILATION, GREATER THAN 96 CONSECUTIVE HOURS: ICD-10-PCS | Performed by: INTERNAL MEDICINE

## 2018-10-15 PROCEDURE — 82803 BLOOD GASES ANY COMBINATION: CPT

## 2018-10-15 PROCEDURE — 84443 ASSAY THYROID STIM HORMONE: CPT

## 2018-10-15 PROCEDURE — 84478 ASSAY OF TRIGLYCERIDES: CPT

## 2018-10-15 RX ADMIN — ALBUTEROL SULFATE SCH MG: 2.5 SOLUTION RESPIRATORY (INHALATION) at 20:44

## 2018-10-15 RX ADMIN — PROPOFOL PRN MLS/HR: 10 INJECTION, EMULSION INTRAVENOUS at 23:48

## 2018-10-15 RX ADMIN — FENTANYL CITRATE PRN MCG: 50 INJECTION INTRAMUSCULAR; INTRAVENOUS at 22:28

## 2018-10-15 RX ADMIN — IPRATROPIUM BROMIDE AND ALBUTEROL SULFATE SCH ML: 2.5; .5 SOLUTION RESPIRATORY (INHALATION) at 23:41

## 2018-10-15 RX ADMIN — ALBUTEROL SULFATE SCH MG: 2.5 SOLUTION RESPIRATORY (INHALATION) at 20:54

## 2018-10-15 NOTE — RADIOLOGY REPORT (SQ)
EXAM DESCRIPTION:  Chest x-ray single view



Time and date completed: 10/15/2018 9:56 PM



CLINICAL HISTORY:  38 years  Male  Post intubation



COMPARISON:  10/15/2018 9:02 PM



FINDINGS: 



Lungs are hyperinflated. No pneumothorax or acute infiltrate.

Endotracheal tube in place above the level of the nikunj by 7 cm.









IMPRESSION: 



Endotracheal tube in place above the level the nikunj

## 2018-10-15 NOTE — RADIOLOGY REPORT (SQ)
EXAM DESCRIPTION:



CLINICAL HISTORY:  38 years  Male  difficulty breathing



COMPARISON:  8/22/2018



FINDINGS: 



The cardiomediastinal silhouette appears unremarkable.

No consolidating infiltrates or pleural effusions.

No pneumothorax. 

Lungs appear hyperinflated





IMPRESSION: 



No acute abnormality is identified.

## 2018-10-15 NOTE — ER DOCUMENT REPORT
ED Respiratory Problem





- General


Mode of Arrival: Ambulatory


Information source: Patient


TRAVEL OUTSIDE OF THE [place] IN LAST 30 DAYS: No





<CARLOS DUMONT - Last Filed: 10/15/18 20:49>





<JHONATHAN CRAWFORD - Last Filed: 10/15/18 22:24>





<ALICIAGORGE FRITZ - Last Filed: 10/15/18 22:54>





- General


Chief Complaint: Asthma Exacerbation


Stated Complaint: SHORTNESS OF BREATH


Time Seen by Provider: 10/15/18 20:36


Notes: 


Patient is a 38-year-old male with asthma presents to the emergency department 

complaining of difficulty breathing onset 3 days ago worsening tonight.  

Patient states that he has taken approximately 60 mg of Prednisone today in 

attempt to help his shortness of breath but it has not worked.  He also 

complains of a small fever and chest tightness.





Patient takes DuoNebs, Advair, Albuterol, Combivent.


 (CARLOS DUMONT)





- Related Data


Allergies/Adverse Reactions: 


 





tramadol Allergy (Verified 04/16/18 13:08)


 











Past Medical History





- General


Information source: Patient





- Social History


Smoking Status: Never Smoker


Cigarette use (# per day): No


Chew tobacco use (# tins/day): No


Family History: Hypertension, None, Reviewed & Not Pertinent


Pulmonary Medical History: Reports: Hx Asthma


Musculoskeletal Medical History: Reports Other - Rib fracture of 3-7. Clavicle 

fracture.


Psychiatric Medical History: Reports: Hx Bipolar Disorder


Infectious Medical History: Reports: Hx MRSA


Past Surgical History: Reports: Hx Abdominal Surgery - umbilical hernia repair, 

Hx Orthopedic Surgery - R clavical; tibia





- Immunizations


Hx Diphtheria, Pertussis, Tetanus Vaccination: Yes


Hx Pneumococcal Vaccination: 10/01/11





<CARLOS DUMONT - Last Filed: 10/15/18 20:49>





Review of Systems





- Review of Systems


Constitutional: No symptoms reported


EENT: No symptoms reported


Cardiovascular: See HPI, Chest pain - tightness


Respiratory: See HPI, Short of breath


Gastrointestinal: No symptoms reported


Genitourinary: No symptoms reported


Male Genitourinary: No symptoms reported


Musculoskeletal: No symptoms reported


Skin: No symptoms reported


Hematologic/Lymphatic: No symptoms reported


Neurological/Psychological: No symptoms reported


-: Yes All other systems reviewed and negative





<CARLOS DUMONT - Last Filed: 10/15/18 20:49>





Physical Exam





- General


General appearance: Alert, Other - Appears short of breath


In distress: Moderate





- HEENT


Head: Normocephalic, Atraumatic


Eyes: Normal


Conjunctiva: Normal


Extraocular movements intact: Yes


Pupils: PERRL


Mucous membranes: Normal


Neck: Normal





- Respiratory


Respiratory status: Respiratory distress, Tachypnea


Breath sounds: Wheezing - Diffuse inspiratory and expiratory wheezes





- Cardiovascular


Rhythm: Regular


Heart sounds: Normal auscultation


Murmur: No


Friction rub: No


Gallop: None auscultated





- Abdominal


Inspection: Normal





- Back


Back: Normal





- Extremities


General upper extremity: Normal ROM


General lower extremity: Normal ROM





- Neurological


Neuro grossly intact: Yes


Cognition: Normal


Orientation: AAOx4


Cowden Coma Scale Eye Opening: Spontaneous


Brenna Coma Scale Verbal: Oriented


Cowden Coma Scale Motor: Obeys Commands


Cowden Coma Scale Total: 15


Speech: Normal





- Psychological


Associated symptoms: Anxious





- Skin


Skin Temperature: Warm


Skin Moisture: Dry


Skin Color: Normal





<CARLOS DUMONT - Last Filed: 10/15/18 20:49>





- Vital signs


Vitals: 


 











Resp Pulse Ox


 


 22 H  96 


 


 10/15/18 20:50  10/15/18 20:50














Course





- Laboratory


Result Diagrams: 


 10/15/18 20:42





 10/15/18 20:42





<CARLOS DUMONT - Last Filed: 10/15/18 20:49>





- Laboratory


Result Diagrams: 


 10/15/18 20:42





 10/15/18 20:42





- Diagnostic Test


Radiology reviewed: Image reviewed, Reports reviewed - Initial chest x-ray did 

not show any acute abnormality.  The post intubation chest x-ray showed good 

tube position with good aeration of both lungs.





- EKG Interpretation by Me


EKG shows normal: Sinus rhythm, Axis, QRS Complexes, ST-T Waves.  abnormal: 

Intervals - Borderline prolonged QT interval


Rate: Tachycardia - 106


P Waves: LAE





- Consults


  ** Dr. Holman


Time consulted: 22:00


Consulted provider: will come to ER





<JHONATHAN CRAWFORD - Last Filed: 10/15/18 22:24>





- Laboratory


Result Diagrams: 


 10/15/18 20:42





 10/15/18 20:42





<GORGE HODGSON - Last Filed: 10/15/18 22:54>





- Re-evaluation


Re-evalutation: 





10/15/18 22:05


The patient was initially quite tachypneic with very tight inspiratory wheezes 

and not very good air movement.  He was given multiple breathing treatments of 

DuoNeb, 1 racemic epinephrine, albuterol, and did receive Solu-Medrol IV and 2 

g of magnesium IV.  He continued to deteriorate, became quite diaphoretic and 

air hungry.  At that point decision to intubate was made.  He was given 120 mg 

of ketamine in preparation for intubation due to his combativeness.  Initial 

management was delayed because he pulled his IV out and it had to be reinserted.


Dr. Hodgson came in the room to assist in management of the patient.  He did 

offer to intubate patient.  He did intubate the patient on first try without 

complications.  See his note for details.


10/15/18 22:18


The patient was put on propofol drip.  He was given fentanyl 25 mg as needed 

agitation.  Despite this he tried to come up off the table bucking the 

ventilator enough to be concerned about extubation.  He was given vecuronium 10 

mg IV to help protect him and the endotracheal tube placement. (JHONATHAN CRAWFORD)





- Vital Signs


Vital signs: 


 











Temp Pulse Resp BP Pulse Ox


 


       20   111/63   97 


 


       10/15/18 22:46  10/15/18 22:46  10/15/18 22:46














- Laboratory


Laboratory results interpreted by me: 


 











  10/15/18





  20:42


 


Hgb  12.1 L


 


Hct  36.5 L


 


MCH  26.8 L


 


RDW  15.6 H


 


Eosinophils %  11.1 H


 


Absolute Eosinophils  0.9 H














Procedures





- Intubation


  ** Orotracheal


Time of Intubation: 21:30


Airway evaluation: Normal anatomy


Mallampati Classification: Class 2


Medications: Ketamine


Intubation method: Orotracheal


Blade type: Fatima


Blade size: 4


Equipment used: Glidescope


ETT size: 7.5


ETT secured at: Lips


ETT secured at (cm): 22


Breath Sounds after Intubation: Equal


End tidal CO2 confirmed: Yes


Ventilator settings: SIMV


Tidal volume: 450


FiO2: 100


Respirations: 18


PEEP: 0


Post Intubation Xray: Yes


Intubation Complications: No complications





<GORGE HODGSON - Last Filed: 10/15/18 22:54>





Critical Care Note





- Critical Care Note


Total time excluding time spent on procedures (mins): 50





<JHONATHAN CRAWFORD - Last Filed: 10/15/18 22:24>





Discharge





<CARLOS DUMONT - Last Filed: 10/15/18 20:49>





- Discharge


Admitting Provider: Hospitalist


Unit Admitted: ICU





<JHONATHAN CRAWFORD - Last Filed: 10/15/18 22:24>





<ALICIAGORGE FRITZ - Last Filed: 10/15/18 22:54>





- Discharge


Clinical Impression: 


 Acute severe exacerbation of asthma





Acute respiratory failure


Qualifiers:


 Respiratory failure complication: hypoxia Qualified Code(s): J96.01 - Acute 

respiratory failure with hypoxia





Condition: Critical


Disposition: ADMITTED AS INPATIENT


Scribe Attestation: 





10/15/18 21:08


I personally performed the services described in the documentation, reviewed 

and edited the documentation which was dictated to the scribe in my presence, 

and it accurately records my words and actions. (JHONATHAN CRAWFORD)





Scribe Documentation





- Scribe


Written by Walie:: Olga Lidia Marshall, 10/15/2018 20:48


acting as scribe for :: Stacy





<CARLOS DUMONT - Last Filed: 10/15/18 20:49>

## 2018-10-16 LAB
ABSOLUTE LYMPHOCYTES# (MANUAL): 0.5 10^3/UL (ref 0.5–4.7)
ABSOLUTE LYMPHOCYTES# (MANUAL): 0.7 10^3/UL (ref 0.5–4.7)
ABSOLUTE MONOCYTES # (MANUAL): 0 10^3/UL (ref 0.1–1.4)
ABSOLUTE MONOCYTES # (MANUAL): 0.1 10^3/UL (ref 0.1–1.4)
ABSOLUTE NEUTROPHILS# (MANUAL): 10.2 10^3/UL (ref 1.7–8.2)
ABSOLUTE NEUTROPHILS# (MANUAL): 10.5 10^3/UL (ref 1.7–8.2)
ADD MANUAL DIFF: YES
ADD MANUAL DIFF: YES
ALBUMIN SERPL-MCNC: 4.1 G/DL (ref 3.5–5)
ALP SERPL-CCNC: 63 U/L (ref 38–126)
ALT SERPL-CCNC: 22 U/L (ref 21–72)
ANION GAP SERPL CALC-SCNC: 10 MMOL/L (ref 5–19)
ANION GAP SERPL CALC-SCNC: 10 MMOL/L (ref 5–19)
ANISOCYTOSIS BLD QL SMEAR: SLIGHT
ANISOCYTOSIS BLD QL SMEAR: SLIGHT
ARTERIAL BLOOD FIO2: (no result)
ARTERIAL BLOOD H2CO3: 1.43 MMOL/L (ref 1.05–1.35)
ARTERIAL BLOOD H2CO3: 1.56 MMOL/L (ref 1.05–1.35)
ARTERIAL BLOOD H2CO3: 2.15 MMOL/L (ref 1.05–1.35)
ARTERIAL BLOOD HCO3: 26.2 MMOL/L (ref 20–24)
ARTERIAL BLOOD HCO3: 28.8 MMOL/L (ref 20–24)
ARTERIAL BLOOD HCO3: 29.2 MMOL/L (ref 20–24)
ARTERIAL BLOOD PCO2: 47.5 MMHG (ref 35–45)
ARTERIAL BLOOD PCO2: 51.7 MMHG (ref 35–45)
ARTERIAL BLOOD PCO2: 71.4 MMHG (ref 35–45)
ARTERIAL BLOOD PH: 7.23 (ref 7.35–7.45)
ARTERIAL BLOOD PH: 7.32 (ref 7.35–7.45)
ARTERIAL BLOOD PH: 7.4 (ref 7.35–7.45)
ARTERIAL BLOOD PO2: 349.7 MMHG (ref 80–100)
ARTERIAL BLOOD PO2: 70.9 MMHG (ref 80–100)
ARTERIAL BLOOD PO2: 80.4 MMHG (ref 80–100)
ARTERIAL BLOOD TOTAL CO2: 27.8 MMOL/L (ref 23–27)
ARTERIAL BLOOD TOTAL CO2: 30.2 MMOL/L (ref 23–27)
ARTERIAL BLOOD TOTAL CO2: 31.4 MMOL/L (ref 23–27)
AST SERPL-CCNC: 24 U/L (ref 17–59)
BASE EXCESS BLDA CALC-SCNC: -0.4 MMOL/L
BASE EXCESS BLDA CALC-SCNC: 0.1 MMOL/L
BASE EXCESS BLDA CALC-SCNC: 3.3 MMOL/L
BASOPHILS NFR BLD MANUAL: 0 % (ref 0–2)
BASOPHILS NFR BLD MANUAL: 0 % (ref 0–2)
BILIRUB DIRECT SERPL-MCNC: 0.1 MG/DL (ref 0–0.4)
BILIRUB SERPL-MCNC: 0.4 MG/DL (ref 0.2–1.3)
BUN SERPL-MCNC: 12 MG/DL (ref 7–20)
BUN SERPL-MCNC: 13 MG/DL (ref 7–20)
CALCIUM: 9.4 MG/DL (ref 8.4–10.2)
CALCIUM: 9.6 MG/DL (ref 8.4–10.2)
CHLORIDE SERPL-SCNC: 104 MMOL/L (ref 98–107)
CHLORIDE SERPL-SCNC: 106 MMOL/L (ref 98–107)
CK MB SERPL-MCNC: 3.28 NG/ML (ref ?–4.55)
CK MB SERPL-MCNC: 3.56 NG/ML (ref ?–4.55)
CK MB SERPL-MCNC: 3.86 NG/ML (ref ?–4.55)
CK SERPL-CCNC: 272 U/L (ref 55–170)
CO2 SERPL-SCNC: 27 MMOL/L (ref 22–30)
CO2 SERPL-SCNC: 28 MMOL/L (ref 22–30)
DACRYOCYTES BLD QL SMEAR: SLIGHT
EOSINOPHIL NFR BLD MANUAL: 0 % (ref 0–6)
EOSINOPHIL NFR BLD MANUAL: 0 % (ref 0–6)
ERYTHROCYTE [DISTWIDTH] IN BLOOD BY AUTOMATED COUNT: 15.8 % (ref 11.5–14)
ERYTHROCYTE [DISTWIDTH] IN BLOOD BY AUTOMATED COUNT: 15.8 % (ref 11.5–14)
GLUCOSE SERPL-MCNC: 142 MG/DL (ref 75–110)
GLUCOSE SERPL-MCNC: 148 MG/DL (ref 75–110)
HCT VFR BLD CALC: 35.4 % (ref 37.9–51)
HCT VFR BLD CALC: 35.8 % (ref 37.9–51)
HGB BLD-MCNC: 11.8 G/DL (ref 13.5–17)
HGB BLD-MCNC: 12 G/DL (ref 13.5–17)
MCH RBC QN AUTO: 26.6 PG (ref 27–33.4)
MCH RBC QN AUTO: 27.2 PG (ref 27–33.4)
MCHC RBC AUTO-ENTMCNC: 32.9 G/DL (ref 32–36)
MCHC RBC AUTO-ENTMCNC: 33.8 G/DL (ref 32–36)
MCV RBC AUTO: 81 FL (ref 80–97)
MCV RBC AUTO: 81 FL (ref 80–97)
MONOCYTES % (MANUAL): 0 % (ref 3–13)
MONOCYTES % (MANUAL): 1 % (ref 3–13)
OVALOCYTES BLD QL SMEAR: (no result)
OVALOCYTES BLD QL SMEAR: (no result)
PLATELET # BLD: 241 10^3/UL (ref 150–450)
PLATELET # BLD: 276 10^3/UL (ref 150–450)
PLATELET COMMENT: ADEQUATE
PLATELET COMMENT: ADEQUATE
POIKILOCYTOSIS BLD QL SMEAR: (no result)
POIKILOCYTOSIS BLD QL SMEAR: (no result)
POTASSIUM SERPL-SCNC: 4.3 MMOL/L (ref 3.6–5)
POTASSIUM SERPL-SCNC: 4.7 MMOL/L (ref 3.6–5)
PROT SERPL-MCNC: 6.8 G/DL (ref 6.3–8.2)
RBC # BLD AUTO: 4.4 10^6/UL (ref 4.35–5.55)
RBC # BLD AUTO: 4.43 10^6/UL (ref 4.35–5.55)
SAO2 % BLDA: 94.1 % (ref 94–98)
SAO2 % BLDA: 94.9 % (ref 94–98)
SAO2 % BLDA: 99.7 % (ref 94–98)
SEGMENTED NEUTROPHILS % (MAN): 94 % (ref 42–78)
SEGMENTED NEUTROPHILS % (MAN): 94 % (ref 42–78)
SODIUM SERPL-SCNC: 141.8 MMOL/L (ref 137–145)
SODIUM SERPL-SCNC: 143.1 MMOL/L (ref 137–145)
TARGETS BLD QL SMEAR: (no result)
TARGETS BLD QL SMEAR: SLIGHT
TOTAL CELLS COUNTED BLD: 100
TOTAL CELLS COUNTED BLD: 100
TOXIC GRANULES BLD QL SMEAR: SLIGHT
TROPONIN I SERPL-MCNC: < 0.012 NG/ML
VARIANT LYMPHS NFR BLD MANUAL: 5 % (ref 13–45)
VARIANT LYMPHS NFR BLD MANUAL: 6 % (ref 13–45)
WBC # BLD AUTO: 10.9 10^3/UL (ref 4–10.5)
WBC # BLD AUTO: 11.2 10^3/UL (ref 4–10.5)
WBC TOXIC VACUOLES BLD QL SMEAR: PRESENT

## 2018-10-16 RX ADMIN — PROPOFOL PRN MLS/HR: 10 INJECTION, EMULSION INTRAVENOUS at 10:00

## 2018-10-16 RX ADMIN — MIDAZOLAM HYDROCHLORIDE PRN MLS/HR: 5 INJECTION, SOLUTION INTRAMUSCULAR; INTRAVENOUS at 20:51

## 2018-10-16 RX ADMIN — IPRATROPIUM BROMIDE AND ALBUTEROL SULFATE SCH ML: 2.5; .5 SOLUTION RESPIRATORY (INHALATION) at 04:16

## 2018-10-16 RX ADMIN — ALBUTEROL SULFATE PRN MG: 2.5 SOLUTION RESPIRATORY (INHALATION) at 20:41

## 2018-10-16 RX ADMIN — PROPOFOL PRN MLS/HR: 10 INJECTION, EMULSION INTRAVENOUS at 06:29

## 2018-10-16 RX ADMIN — METOCLOPRAMIDE SCH MLS/HR: 5 INJECTION, SOLUTION INTRAMUSCULAR; INTRAVENOUS at 17:23

## 2018-10-16 RX ADMIN — METHYLPREDNISOLONE SODIUM SUCCINATE SCH MG: 125 INJECTION, POWDER, FOR SOLUTION INTRAMUSCULAR; INTRAVENOUS at 14:39

## 2018-10-16 RX ADMIN — Medication SCH: at 21:20

## 2018-10-16 RX ADMIN — Medication SCH ML: at 14:38

## 2018-10-16 RX ADMIN — LEVOFLOXACIN SCH MLS/HR: 750 INJECTION, SOLUTION INTRAVENOUS at 21:19

## 2018-10-16 RX ADMIN — BUDESONIDE SCH MG: 0.5 SUSPENSION RESPIRATORY (INHALATION) at 20:41

## 2018-10-16 RX ADMIN — MONTELUKAST SODIUM SCH MG: 10 TABLET, FILM COATED ORAL at 21:20

## 2018-10-16 RX ADMIN — HEPARIN SODIUM SCH UNIT: 5000 INJECTION, SOLUTION INTRAVENOUS; SUBCUTANEOUS at 21:20

## 2018-10-16 RX ADMIN — PROPOFOL PRN MLS/HR: 10 INJECTION, EMULSION INTRAVENOUS at 13:02

## 2018-10-16 RX ADMIN — FENTANYL CITRATE PRN MCG: 50 INJECTION INTRAMUSCULAR; INTRAVENOUS at 11:51

## 2018-10-16 RX ADMIN — IPRATROPIUM BROMIDE SCH MG: 0.5 SOLUTION RESPIRATORY (INHALATION) at 15:53

## 2018-10-16 RX ADMIN — LEVALBUTEROL HYDROCHLORIDE SCH MG: 1.25 SOLUTION RESPIRATORY (INHALATION) at 15:53

## 2018-10-16 RX ADMIN — HEPARIN SODIUM SCH UNIT: 5000 INJECTION, SOLUTION INTRAVENOUS; SUBCUTANEOUS at 14:38

## 2018-10-16 RX ADMIN — METHYLPREDNISOLONE SODIUM SUCCINATE SCH MG: 125 INJECTION, POWDER, FOR SOLUTION INTRAMUSCULAR; INTRAVENOUS at 21:20

## 2018-10-16 RX ADMIN — PROPOFOL PRN MLS/HR: 10 INJECTION, EMULSION INTRAVENOUS at 16:38

## 2018-10-16 RX ADMIN — PROPOFOL PRN MLS/HR: 10 INJECTION, EMULSION INTRAVENOUS at 03:23

## 2018-10-16 RX ADMIN — IPRATROPIUM BROMIDE AND ALBUTEROL SULFATE SCH ML: 2.5; .5 SOLUTION RESPIRATORY (INHALATION) at 11:21

## 2018-10-16 RX ADMIN — Medication SCH ML: at 05:54

## 2018-10-16 RX ADMIN — PROPOFOL PRN MLS/HR: 10 INJECTION, EMULSION INTRAVENOUS at 22:26

## 2018-10-16 RX ADMIN — PANTOPRAZOLE SODIUM SCH MG: 40 INJECTION, POWDER, FOR SOLUTION INTRAVENOUS at 10:24

## 2018-10-16 RX ADMIN — PROPOFOL PRN MLS/HR: 10 INJECTION, EMULSION INTRAVENOUS at 20:08

## 2018-10-16 RX ADMIN — FENTANYL CITRATE PRN MCG: 50 INJECTION INTRAMUSCULAR; INTRAVENOUS at 20:51

## 2018-10-16 RX ADMIN — HEPARIN SODIUM SCH UNIT: 5000 INJECTION, SOLUTION INTRAVENOUS; SUBCUTANEOUS at 05:54

## 2018-10-16 RX ADMIN — MIDAZOLAM HYDROCHLORIDE PRN MLS/HR: 5 INJECTION, SOLUTION INTRAMUSCULAR; INTRAVENOUS at 10:04

## 2018-10-16 RX ADMIN — IPRATROPIUM BROMIDE AND ALBUTEROL SULFATE SCH ML: 2.5; .5 SOLUTION RESPIRATORY (INHALATION) at 08:00

## 2018-10-16 RX ADMIN — BUDESONIDE SCH MG: 0.5 SUSPENSION RESPIRATORY (INHALATION) at 10:16

## 2018-10-16 RX ADMIN — DEXTROSE, SODIUM CHLORIDE, AND POTASSIUM CHLORIDE PRN MLS/HR: 5; .45; .15 INJECTION INTRAVENOUS at 22:44

## 2018-10-16 NOTE — PDOC PROGRESS REPORT
Subjective


Progress Note for:: 10/16/18


Subjective:: 


CHRISTY CASTRO is a 38 year old male who presented to the emergency room with 

a 3-day history of progressively worsening dyspnea despite taking 60 mg of 

prednisone that he had at home for use when he experiences an exacerbation of 

his asthma.  Mr. Castro has a past medical history of asthma, bronchitis, 

tobacco and polysubstance abuse.  In the emergency room he was found to have 

acute respiratory decompensation and was rapidly intubated.  He was 

subsequently started on Solu-Medrol, magnesium, albuterol by the emergency room 

provider.  His ABG revealed a PCO2 of 70 and as he was maintained on the 

ventilator and transferred to the ICU for care by the hospitalist service.





10/16/18:


Patient is unable to provide subjective input since he is sedated and on a 

ventilator.  His examination reveals mild end expiratory wheezing and a 

minimally prolongated expiratory phase.  He will be maintained on the 

ventilator for the rest the day at the direction of Dr. Samuel who is seeing 

the patient in consultation.  Hopefully Mr. Castro can be extubated tomorrow.


Reason For Visit: 


ASTHMA EXACERBATION,ACUTE RESPIRATORY FAILURE,








Physical Exam


Vital Signs: 


 











Temp Pulse Resp BP Pulse Ox


 


 99.1 F   120 H  19   142/86 H  95 


 


 10/16/18 10:00  10/16/18 11:21  10/16/18 11:21  10/16/18 09:36  10/16/18 11:21








 Intake & Output











 10/14/18 10/15/18 10/16/18





 23:59 23:59 23:59


 


Intake Total  1000 120


 


Output Total  210 1230


 


Balance  790 -1110


 


Weight  78 kg 78 kg











General appearance: PRESENT: well-developed, well-nourished, other - 

Endotracheally intubated and sedated for ventilation.


Head exam: PRESENT: atraumatic, normocephalic


Eye exam: ABSENT: conjunctival injection, scleral icterus


Ear exam: PRESENT: normal external ear exam.  ABSENT: drainage


Mouth exam: PRESENT: dry mucosa, tongue midline, other - Endotracheal tube in 

good position


Neck exam: ABSENT: thyromegaly, tracheal deviation


Respiratory exam: PRESENT: prolonged expiratory phas - Mildly prolonged 

expiratory phase, symmetrical, wheezes - Mild end expiratory wheezes, other - 

Mechanical ventilation


Cardiovascular exam: PRESENT: RRR.  ABSENT: clicks, gallop, rubs


Pulses: PRESENT: normal radial pulses, normal dorsalis pedis pul


Vascular exam: PRESENT: normal capillary refill.  ABSENT: pallor


GI/Abdominal exam: PRESENT: normal bowel sounds, soft


Rectal exam: PRESENT: deferred - [id number]


Extremities exam: ABSENT: joint swelling, pedal edema


Musculoskeletal exam: ABSENT: deformity, dislocation


Neurological exam: PRESENT: other - Somnolent due to sedation for ventilation


Psychiatric exam: PRESENT: other - Unable to assess due to sedation for 

ventilation


Skin exam: ABSENT: jaundice, rash, urticaria





Results


Laboratory Results: 


 











  10/15/18 10/15/18 10/16/18





  22:55 23:45 04:12


 


WBC    11.2 H


 


RBC    4.43


 


Hgb    11.8 L


 


Hct    35.8 L


 


MCV    81


 


MCH    26.6 L


 


MCHC    32.9


 


RDW    15.8 H


 


Plt Count    241


 


Seg Neutrophils %    Not Reportable


 


Lymphocytes %    Not Reportable


 


Monocytes %    Not Reportable


 


Eosinophils %    Not Reportable


 


Basophils %    Not Reportable


 


Absolute Neutrophils    Not Reportable


 


Absolute Lymphocytes    Not Reportable


 


Absolute Monocytes    Not Reportable


 


Absolute Eosinophils    Not Reportable


 


Absolute Basophils    Not Reportable


 


Carbonic Acid   2.15 H 


 


HCO3/H2CO3 Ratio   13:1 


 


ABG pH   7.23 L 


 


ABG pCO2   71.4 H* 


 


ABG pO2   349.7 H 


 


ABG HCO3   29.2 H 


 


ABG O2 Saturation   99.7 H 


 


ABG Base Excess   0.1 


 


FiO2   70% 


 


Sodium   


 


Potassium   


 


Chloride   


 


Carbon Dioxide   


 


Anion Gap   


 


BUN   


 


Creatinine   


 


Est GFR ( Amer)   


 


Est GFR (Non-Af Amer)   


 


Glucose   


 


Calcium   


 


Total Bilirubin   


 


AST   


 


ALT   


 


Alkaline Phosphatase   


 


Total Protein   


 


Albumin   


 


Urine Color  YELLOW  


 


Urine Appearance  SLIGHTLY-CLOUDY  


 


Urine pH  5.0  


 


Ur Specific Gravity  1.014  


 


Urine Protein  30 H  


 


Urine Glucose (UA)  NEGATIVE  


 


Urine Ketones  NEGATIVE  


 


Urine Blood  SMALL H  


 


Urine Nitrite  NEGATIVE  


 


Ur Leukocyte Esterase  NEGATIVE  


 


Urine WBC (Auto)  2  


 


Urine RBC (Auto)  4  














  10/16/18 10/16/18





  04:12 05:45


 


WBC  


 


RBC  


 


Hgb  


 


Hct  


 


MCV  


 


MCH  


 


MCHC  


 


RDW  


 


Plt Count  


 


Seg Neutrophils %  


 


Lymphocytes %  


 


Monocytes %  


 


Eosinophils %  


 


Basophils %  


 


Absolute Neutrophils  


 


Absolute Lymphocytes  


 


Absolute Monocytes  


 


Absolute Eosinophils  


 


Absolute Basophils  


 


Carbonic Acid   1.56 H


 


HCO3/H2CO3 Ratio   16:1


 


ABG pH   7.32 L


 


ABG pCO2   51.7 H


 


ABG pO2   80.4


 


ABG HCO3   26.2 H


 


ABG O2 Saturation   94.9


 


ABG Base Excess   -0.4


 


FiO2   60%


 


Sodium  143.1 


 


Potassium  4.7 


 


Chloride  106 


 


Carbon Dioxide  27 


 


Anion Gap  10 


 


BUN  12 


 


Creatinine  0.86 


 


Est GFR ( Amer)  > 60 


 


Est GFR (Non-Af Amer)  > 60 


 


Glucose  148 H 


 


Calcium  9.4 


 


Total Bilirubin  0.4 


 


AST  24 


 


ALT  22 


 


Alkaline Phosphatase  63 


 


Total Protein  6.8 


 


Albumin  4.1 


 


Urine Color  


 


Urine Appearance  


 


Urine pH  


 


Ur Specific Gravity  


 


Urine Protein  


 


Urine Glucose (UA)  


 


Urine Ketones  


 


Urine Blood  


 


Urine Nitrite  


 


Ur Leukocyte Esterase  


 


Urine WBC (Auto)  


 


Urine RBC (Auto)  








 











  10/16/18 10/16/18 10/16/18





  04:12 05:32 05:32


 


Creatine Kinase   304 H 


 


CK-MB (CK-2)    3.56


 


Troponin I    < 0.012


 


NT-Pro-B Natriuret Pep  191 H  











Impressions: 


 





Chest X-Ray  10/15/18 21:34


IMPRESSION: 


 


Endotracheal tube in place above the level the nikunj


 














Assessment & Plan





- Diagnosis


(1) Acute respiratory failure with hypoxia and hypercapnia


Is this a current diagnosis for this admission?: Yes   


Plan: 


Patient is admitted to the ICU and treated with mechanical ventilation via 

endotracheal tube.  Dr. Samuel is consulting and he and I have discussed the 

case extensively today.  Patient's chest x-ray has been reviewed personally by 

myself.  Additionally I have ordered daily lab work of a venous blood gases and 

a daily chest x-ray while the patient is on a ventilator.








(2) Acute severe exacerbation of asthma


Is this a current diagnosis for this admission?: Yes   


Plan: 


Patient will be treated with intravenous steroids utilizing Solu-Medrol and 

aggressive pulmonary toilet utilizing levalbuterol, ipratropium, budesonide and 

Mucomyst.  He will be followed with daily lab work including a CBC, BMP, 

magnesium level as well as a daily chest x-ray.








- Time


Time Spent with patient: 35 or more minutes


Medications reviewed and adjusted accordingly: Yes


Anticipated discharge: Home

## 2018-10-16 NOTE — PDOC H&P
History of Present Illness


Admission Date/PCP: 


  10/15/18 22:20





  





Patient complains of: Asthma exacerbation


History of Present Illness: 


CHRISTY JEFFERSON is a 38 year old male with a past medical history of asthma, 

bronchitis, tobacco and polysubstance abuse.  Patient presents to the emergency 

room with shortness of breath over 3 days worsening despite 60 mg of 

prednisone.  In the emergency room he is found to have acute respiratory 

decompensation and rapidly intubated started on Solu-Medrol, magnesium, 

albuterol by the emergency room provider.  Is ABG reveals a PCO2 of 70.








Past Medical History


Pulmonary Medical History: Reports: Asthma, Bronchitis


   Denies: Chronic Obstructive Pulmonary Disease (COPD), Intubation, Pneumonia, 

Respiratory Failure, Sleep Apnea, Tuberculosis


Neurological Medical History: 


   Denies: Migraine, Seizures


Endocrine Medical History: 


   Denies: Diabetes Mellitus Type 1, Diabetes Mellitus Type 2, Hyperthyroidism, 

Hypothyroidism


Musculoskeltal Medical History: Reports: Other - Rib fracture of 3-7. Clavicle 

fracture.


Psychiatric Medical History: Reports: Bipolar Disorder, Substance Abuse


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





Past Surgical History


Past Surgical History: Reports: Orthopedic Surgery - R clavical; tibia





Social History


Information Source: Emergency Med Personnel, [facility] Records


Smoking Status: Unknown if Ever Smoked


Frequency of Alcohol Use: Occasional


Hx Recreational Drug Use: Yes


Drugs: Cocaine, Marijuana


Hx Prescription Drug Abuse: No





- Advance Directive


Resuscitation Status: Full Code





Family History


Family History: Hypertension, Other - Unobtainable


Parental Family History Reviewed: Yes


Children Family History Reviewed: Yes


Sibling(s) Family History Reviewed.: Yes





Medication/Allergy


Home Medications: 








Albuterol Sulfate [Ventolin HFA] 1 puff IH Q4HP PRN 12/23/12 


Metaxalone [Skelaxin 800 mg Tablet] 800 mg PO ASDIR PRN #20 tablet 12/23/12 


Oxycodone HCl/Acetaminophen [Percocet 5-325 mg Tablet] 1 - 2 tab PO ASDIR PRN #

15 tablet 12/23/12 


Prednisone [Deltasone 10 mg Tablet] 10 mg PO ASDIR #21 tablet 12/23/12 


Cyclobenzaprine HCl [Flexeril] 5 mg PO TID #15 tablet 06/22/13 


Prednisone [Deltasone 10 mg Tablet] 10 mg PO ASDIR PRN #21 tablet 06/22/13 


Albuterol Sulfate [Ventolin HFA] 1 puff IH Q4HP PRN #17 gm 07/05/13 


Ipratropium/Albuterol Sulfate [Combivent Inhaler] 1 puff IH QID #1 aer.w.adap 07 /05/13 


Prednisone [Deltasone 10 mg Tablet] 10 mg PO ASDIR PRN #21 tablet 07/05/13 


Albuterol Sulfate [Ventolin HFA MDI 18 GM] 2 puff IH Q4H PRN #1 mdi 07/21/13 


Ipratropium Bromide [Atrovent 0.02% Neb 0.5 mg/2.5 ml Ampul] 0.5 mg NEB BID PRN 

#10 vial.neb 07/21/13 


Oxycodone HCl/Acetaminophen [Percocet 5-325 mg Tablet] 1 - 2 tab PO ASDIR PRN #

15 tablet 07/21/13 


Prednisone [Deltasone 10 mg Tablet] 10 mg PO ASDIR PRN #15 tablet 07/21/13 


Methocarbamol [Robaxin 500 Mg Tablet] 500 mg PO QID PRN #60 tablet 08/08/13 


Tramadol HCl [Ultram 50 mg Tablet] 50 mg PO ASDIR PRN #20 tablet 08/08/13 


Fentanyl [Duragesic 75 Mcg/Hr Transdermal Patch] 1 each TD Q3D #2 patch.td72 04/ 16/18 


Doxycycline Hyclate 100 mg PO BID #14 capsule 06/08/18 


Hydrocodone/Acetaminophen [Norco 5-325 mg Tablet] 1 tab PO Q6H #15 tablet 06/08/ 18 


Hydroxyzine Pamoate [Vistaril 25 mg Capsule] 25 mg PO Q8H PRN #15 capsule 06/08/ 18 


Ibuprofen [Motrin 600 Mg Tablet] 600 mg PO TID #30 tablet 06/08/18 


Prednisone [Deltasone 20 mg Tablet] 2 tab PO DAILY 5 Days #10 tablet 06/08/18 


Albuterol Sulfate [Ventolin Hfa] 2 puff IH Q4HP PRN 06/30/18 


Ibuprofen [Motrin 600 mg Tablet] 600 mg PO Q8HP PRN 06/30/18 


Ipratropium/Albuterol Sulfate [Combivent Respimat 4 gm Mdi] 1 puff IH Q6 06/30/ 18 


Albuterol Sulfate [Proair Respiclick] 90 mcg IH Q3HP PRN 30 Days #1 aer.pow.ba 

07/01/18 


Fluticasone/Salmeterol [Advair 250-50 Diskus 28 dose] 1 inh IH Q12H #1 inhaler 

07/01/18 


Ipratropium/Albuterol Sulfate [Duoneb 3 ml Ampul] 3 ml NEB RTQ4HP PRN 30 Days #

1 vial.neb 07/01/18 


Ipratropium/Albuterol Sulfate [Duoneb 3 ml Ampul] 3 ml NEB RTQ4HP PRN 30 Days #

30 vial.neb 07/01/18 


Omeprazole Magnesium [Prilosec Otc] 20 mg PO DAILY 7 Days #7 tablet. 07/01/18 


Oxycodone HCl/Acetaminophen [Percocet  Mg Tablet] 1 each PO Q8HP PRN 3 

Days #10 tablet 07/01/18 


Prednisone 60 mg PO DAILY #26 tablet 07/01/18 


Ibuprofen [Motrin 400 mg Tablet] 400 mg PO Q6 PRN #20 tablet 08/22/18 


Ipratropium/Albuterol Sulfate [Combivent Inhaler] 1 puff IH BID #1 aer.w.adap 08 /22/18 


Ipratropium/Albuterol Sulfate [Duoneb 3 ml Ampul] 3 ml NEB RTQ4HP PRN #30 

vial.neb 08/22/18 


Prednisone [Deltasone 20 mg Tablet] 3 tab PO DAILY 4 Days  tablet 08/22/18 








Allergies/Adverse Reactions: 


 





tramadol Allergy (Verified 04/16/18 13:08)


 











Review of Systems


ROS unobtainable: Due to mental status - Unobtainable





Physical Exam


Vital Signs: 


 











Temp Pulse Resp BP Pulse Ox


 


 98.2 F   90   20   121/73   95 


 


 10/16/18 06:00  10/16/18 04:15  10/16/18 06:00  10/16/18 05:36  10/16/18 06:00








 Intake & Output











 10/14/18 10/15/18 10/16/18





 11:59 11:59 11:59


 


Intake Total   1120


 


Output Total   1090


 


Balance   30


 


Weight   78 kg











General appearance: PRESENT: severe distress, thin.  ABSENT: cooperative, 

disheveled


Head exam: PRESENT: atraumatic, normocephalic


Eye exam: PRESENT: conjunctiva pink, EOMI, PERRLA.  ABSENT: scleral icterus


Ear exam: PRESENT: normal external ear exam


Mouth exam: PRESENT: moist, tongue midline


Neck exam: ABSENT: carotid bruit, JVD, lymphadenopathy, thyromegaly


Respiratory exam: PRESENT: accessory muscle use, prolonged expiratory phas, 

retraction, tachypnea, wheezes.  ABSENT: clear to auscultation moise, decreased 

breath sounds, stridor


Cardiovascular exam: PRESENT: tachycardia.  ABSENT: diastolic murmur, rubs, 

systolic murmur


Pulses: PRESENT: normal dorsalis pedis pul


Vascular exam: PRESENT: normal capillary refill


GI/Abdominal exam: PRESENT: normal bowel sounds, soft.  ABSENT: distended, 

guarding, mass, organolmegaly, rebound, tenderness


Rectal exam: PRESENT: deferred


Extremities exam: PRESENT: calf tenderness


Neurological exam: PRESENT: other - Intubated and sedated


Skin exam: PRESENT: dry, intact, warm.  ABSENT: cyanosis, rash





Results


Laboratory Results: 


 





 10/16/18 04:12 





 10/16/18 04:12 





 











  10/15/18 10/15/18 10/16/18





  22:55 23:45 04:12


 


WBC    11.2 H


 


RBC    4.43


 


Hgb    11.8 L


 


Hct    35.8 L


 


MCV    81


 


MCH    26.6 L


 


MCHC    32.9


 


RDW    15.8 H


 


Plt Count    241


 


Seg Neutrophils %    Not Reportable


 


Lymphocytes %    Not Reportable


 


Monocytes %    Not Reportable


 


Eosinophils %    Not Reportable


 


Basophils %    Not Reportable


 


Absolute Neutrophils    Not Reportable


 


Absolute Lymphocytes    Not Reportable


 


Absolute Monocytes    Not Reportable


 


Absolute Eosinophils    Not Reportable


 


Absolute Basophils    Not Reportable


 


Carbonic Acid   2.15 H 


 


HCO3/H2CO3 Ratio   13:1 


 


ABG pH   7.23 L 


 


ABG pCO2   71.4 H* 


 


ABG pO2   349.7 H 


 


ABG HCO3   29.2 H 


 


ABG O2 Saturation   99.7 H 


 


ABG Base Excess   0.1 


 


FiO2   70% 


 


Sodium   


 


Potassium   


 


Chloride   


 


Carbon Dioxide   


 


Anion Gap   


 


BUN   


 


Creatinine   


 


Est GFR ( Amer)   


 


Est GFR (Non-Af Amer)   


 


Glucose   


 


Calcium   


 


Total Bilirubin   


 


AST   


 


ALT   


 


Alkaline Phosphatase   


 


Total Protein   


 


Albumin   


 


Urine Color  YELLOW  


 


Urine Appearance  SLIGHTLY-CLOUDY  


 


Urine pH  5.0  


 


Ur Specific Gravity  1.014  


 


Urine Protein  30 H  


 


Urine Glucose (UA)  NEGATIVE  


 


Urine Ketones  NEGATIVE  


 


Urine Blood  SMALL H  


 


Urine Nitrite  NEGATIVE  


 


Ur Leukocyte Esterase  NEGATIVE  


 


Urine WBC (Auto)  2  


 


Urine RBC (Auto)  4  














  10/16/18 10/16/18





  04:12 05:45


 


WBC  


 


RBC  


 


Hgb  


 


Hct  


 


MCV  


 


MCH  


 


MCHC  


 


RDW  


 


Plt Count  


 


Seg Neutrophils %  


 


Lymphocytes %  


 


Monocytes %  


 


Eosinophils %  


 


Basophils %  


 


Absolute Neutrophils  


 


Absolute Lymphocytes  


 


Absolute Monocytes  


 


Absolute Eosinophils  


 


Absolute Basophils  


 


Carbonic Acid   1.56 H


 


HCO3/H2CO3 Ratio   16:1


 


ABG pH   7.32 L


 


ABG pCO2   51.7 H


 


ABG pO2   80.4


 


ABG HCO3   26.2 H


 


ABG O2 Saturation   94.9


 


ABG Base Excess   -0.4


 


FiO2   60%


 


Sodium  143.1 


 


Potassium  4.7 


 


Chloride  106 


 


Carbon Dioxide  27 


 


Anion Gap  10 


 


BUN  12 


 


Creatinine  0.86 


 


Est GFR ( Amer)  > 60 


 


Est GFR (Non-Af Amer)  > 60 


 


Glucose  148 H 


 


Calcium  9.4 


 


Total Bilirubin  0.4 


 


AST  24 


 


ALT  22 


 


Alkaline Phosphatase  63 


 


Total Protein  6.8 


 


Albumin  4.1 


 


Urine Color  


 


Urine Appearance  


 


Urine pH  


 


Ur Specific Gravity  


 


Urine Protein  


 


Urine Glucose (UA)  


 


Urine Ketones  


 


Urine Blood  


 


Urine Nitrite  


 


Ur Leukocyte Esterase  


 


Urine WBC (Auto)  


 


Urine RBC (Auto)  








 











  10/16/18 10/16/18 10/16/18





  04:12 05:32 05:32


 


Creatine Kinase   304 H 


 


CK-MB (CK-2)    3.56


 


Troponin I    < 0.012


 


NT-Pro-B Natriuret Pep  191 H  











Impressions: 


 





Chest X-Ray  10/15/18 21:34


IMPRESSION: 


 


Endotracheal tube in place above the level the nikunj


 














Assessment & Plan





- Diagnosis


(1) Acute severe exacerbation of asthma


Is this a current diagnosis for this admission?: Yes   


Plan: 


ICU admission, follow-up ABG for ventilator optimization, continue steroids, 

albuterol and supportive care








(2) Acute respiratory failure


Qualifiers: 


   Respiratory failure complication: hypoxia   Qualified Code(s): J96.01 - 

Acute respiratory failure with hypoxia   


Is this a current diagnosis for this admission?: Yes   


Plan: 


Secondary to #1, complicated by polysubstance abuse








(3) Polysubstance abuse


Is this a current diagnosis for this admission?: Yes   


Plan: 


Supportive care








- Time


Time Spent: 50 to 70 Minutes





- Inpatient Certification


Medical Necessity: Need Close Monitoring Due to Risk of Patient Decompensation

## 2018-10-16 NOTE — EKG REPORT
SEVERITY:- BORDERLINE ECG -

SINUS TACHYCARDIA

PROBABLE LEFT ATRIAL ABNORMALITY

BORDERLINE PROLONGED QT INTERVAL

:

Confirmed by: Yosi Chance MD 16-Oct-2018 07:56:08

## 2018-10-16 NOTE — PDOC CONSULTATION
Consultation


Consult Date: 10/16/18


Attending physician:: RIYA LAWRENCE


Consult reason:: resp failure





History of Present Illness


Admission Date/PCP: 


  10/15/18 22:20





  





History of Present Illness: 


CHRISTY JEFFERSON is a 38 year old male, presented to the emergency room 

increasing shortness of breath has history of asthma tobacco abuse he was 

subsequently intubated and is currently in ICU intubated and sedated.








Past Medical History


Pulmonary Medical History: Reports: Asthma, Bronchitis


   Denies: Chronic Obstructive Pulmonary Disease (COPD), Intubation, Pneumonia, 

Respiratory Failure, Sleep Apnea, Tuberculosis


Neurological Medical History: 


   Denies: Migraine, Seizures


Endocrine Medical History: 


   Denies: Diabetes Mellitus Type 1, Diabetes Mellitus Type 2, Hyperthyroidism, 

Hypothyroidism


Musculoskeltal Medical History: Reports: Other - Rib fracture of 3-7. Clavicle 

fracture.


Psychiatric Medical History: Reports: Bipolar Disorder, Substance Abuse


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





Past Surgical History


Past Surgical History: Reports: Orthopedic Surgery - R clavical; tibia





Social History


Information Source: [facility] Records


Smoking Status: Current Some Day Smoker


Frequency of Alcohol Use: Occasional


Hx Recreational Drug Use: Yes


Drugs: Cocaine, Marijuana


Hx Prescription Drug Abuse: No





- Advance Directive


Resuscitation Status: Full Code





Family History


Family History: Hypertension, Other - Unobtainable


Parental Family History Reviewed: No


Children Family History Reviewed: No


Sibling(s) Family History Reviewed.: No





Medication/Allergy


Allergies/Adverse Reactions: 


 





tramadol Allergy (Verified 04/16/18 13:08)


 











Review of Systems


ROS unobtainable: Due to endotracheal tube





Physical Exam


Vital Signs: 


 











Temp Pulse Resp BP Pulse Ox


 


 98.8 F   93   20   136/81 H  94 


 


 10/16/18 08:00  10/16/18 08:00  10/16/18 08:00  10/16/18 07:36  10/16/18 08:00








 Intake & Output











 10/15/18 10/16/18 10/17/18





 06:59 06:59 06:59


 


Intake Total  1120 


 


Output Total  1090 350


 


Balance  30 -350


 


Weight  78 kg 











General appearance: PRESENT: no acute distress, disheveled, thin, well-developed

, well-nourished


Head exam: PRESENT: atraumatic, normocephalic


Eye exam: PRESENT: conjunctiva pale, EOMI.  ABSENT: nystagmus, periorbital 

swelling, scleral icterus


Mouth exam: PRESENT: dry mucosa, neck supple, tongue midline, other - ET tube 

in place


Neck exam: ABSENT: carotid bruit, JVD, lymphadenopathy, thyromegaly, tracheal 

deviation, tracheostomy


Respiratory exam: PRESENT: prolonged expiratory phas, rales, rhonchi, unlabored

, wheezes.  ABSENT: retraction, stridor


Cardiovascular exam: PRESENT: RRR, +S1, +S2, tachycardia


Pulses: PRESENT: normal radial pulses


GI/Abdominal exam: PRESENT: soft.  ABSENT: tenderness


Gentrourinary exam: PRESENT: indwelling catheter


Extremities exam: ABSENT: calf tenderness, clubbing, joint swelling


Musculoskeletal exam: ABSENT: deformity, dislocation


Neurological exam: PRESENT: awake, oriented to person.  ABSENT: oriented to 

place, oriented to time, oriented to situation


Skin exam: PRESENT: dry, warm





Results


Laboratory Results: 


 





 10/16/18 04:12 





 10/16/18 04:12 





 











  10/15/18 10/15/18 10/16/18





  22:55 23:45 04:12


 


WBC    11.2 H


 


RBC    4.43


 


Hgb    11.8 L


 


Hct    35.8 L


 


MCV    81


 


MCH    26.6 L


 


MCHC    32.9


 


RDW    15.8 H


 


Plt Count    241


 


Seg Neutrophils %    Not Reportable


 


Lymphocytes %    Not Reportable


 


Monocytes %    Not Reportable


 


Eosinophils %    Not Reportable


 


Basophils %    Not Reportable


 


Absolute Neutrophils    Not Reportable


 


Absolute Lymphocytes    Not Reportable


 


Absolute Monocytes    Not Reportable


 


Absolute Eosinophils    Not Reportable


 


Absolute Basophils    Not Reportable


 


Carbonic Acid   2.15 H 


 


HCO3/H2CO3 Ratio   13:1 


 


ABG pH   7.23 L 


 


ABG pCO2   71.4 H* 


 


ABG pO2   349.7 H 


 


ABG HCO3   29.2 H 


 


ABG O2 Saturation   99.7 H 


 


ABG Base Excess   0.1 


 


FiO2   70% 


 


Sodium   


 


Potassium   


 


Chloride   


 


Carbon Dioxide   


 


Anion Gap   


 


BUN   


 


Creatinine   


 


Est GFR ( Amer)   


 


Est GFR (Non-Af Amer)   


 


Glucose   


 


Calcium   


 


Total Bilirubin   


 


AST   


 


ALT   


 


Alkaline Phosphatase   


 


Total Protein   


 


Albumin   


 


Urine Color  YELLOW  


 


Urine Appearance  SLIGHTLY-CLOUDY  


 


Urine pH  5.0  


 


Ur Specific Gravity  1.014  


 


Urine Protein  30 H  


 


Urine Glucose (UA)  NEGATIVE  


 


Urine Ketones  NEGATIVE  


 


Urine Blood  SMALL H  


 


Urine Nitrite  NEGATIVE  


 


Ur Leukocyte Esterase  NEGATIVE  


 


Urine WBC (Auto)  2  


 


Urine RBC (Auto)  4  














  10/16/18 10/16/18





  04:12 05:45


 


WBC  


 


RBC  


 


Hgb  


 


Hct  


 


MCV  


 


MCH  


 


MCHC  


 


RDW  


 


Plt Count  


 


Seg Neutrophils %  


 


Lymphocytes %  


 


Monocytes %  


 


Eosinophils %  


 


Basophils %  


 


Absolute Neutrophils  


 


Absolute Lymphocytes  


 


Absolute Monocytes  


 


Absolute Eosinophils  


 


Absolute Basophils  


 


Carbonic Acid   1.56 H


 


HCO3/H2CO3 Ratio   16:1


 


ABG pH   7.32 L


 


ABG pCO2   51.7 H


 


ABG pO2   80.4


 


ABG HCO3   26.2 H


 


ABG O2 Saturation   94.9


 


ABG Base Excess   -0.4


 


FiO2   60%


 


Sodium  143.1 


 


Potassium  4.7 


 


Chloride  106 


 


Carbon Dioxide  27 


 


Anion Gap  10 


 


BUN  12 


 


Creatinine  0.86 


 


Est GFR ( Amer)  > 60 


 


Est GFR (Non-Af Amer)  > 60 


 


Glucose  148 H 


 


Calcium  9.4 


 


Total Bilirubin  0.4 


 


AST  24 


 


ALT  22 


 


Alkaline Phosphatase  63 


 


Total Protein  6.8 


 


Albumin  4.1 


 


Urine Color  


 


Urine Appearance  


 


Urine pH  


 


Ur Specific Gravity  


 


Urine Protein  


 


Urine Glucose (UA)  


 


Urine Ketones  


 


Urine Blood  


 


Urine Nitrite  


 


Ur Leukocyte Esterase  


 


Urine WBC (Auto)  


 


Urine RBC (Auto)  








 











  10/16/18 10/16/18 10/16/18





  04:12 05:32 05:32


 


Creatine Kinase   304 H 


 


CK-MB (CK-2)    3.56


 


Troponin I    < 0.012


 


NT-Pro-B Natriuret Pep  191 H  











Impressions: 


 





Chest X-Ray  10/15/18 21:34


IMPRESSION: 


 


Endotracheal tube in place above the level the nikunj


 














Assessment & Plan





- Diagnosis


(1) Acute respiratory failure


Qualifiers: 


   Respiratory failure complication: hypoxia   Qualified Code(s): J96.01 - 

Acute respiratory failure with hypoxia   


Is this a current diagnosis for this admission?: Yes   


Plan: 


On mechanical ventilation oxygenate to keep SaO2 90% or greater ventilate 

maintain normal pH








(2) Acute severe exacerbation of asthma


Is this a current diagnosis for this admission?: Yes   


Plan: 


Inhaled corticosteroid to current medical regimen








- Time


Total Critical Time (Minutes): 50

## 2018-10-17 LAB
ALBUMIN SERPL-MCNC: 4.3 G/DL (ref 3.5–5)
ALP SERPL-CCNC: 65 U/L (ref 38–126)
ALT SERPL-CCNC: 26 U/L (ref 21–72)
ANION GAP SERPL CALC-SCNC: 7 MMOL/L (ref 5–19)
ARTERIAL BLOOD FIO2: (no result)
ARTERIAL BLOOD H2CO3: 1.26 MMOL/L (ref 1.05–1.35)
ARTERIAL BLOOD HCO3: 27.5 MMOL/L (ref 20–24)
ARTERIAL BLOOD PCO2: 42 MMHG (ref 35–45)
ARTERIAL BLOOD PH: 7.43 (ref 7.35–7.45)
ARTERIAL BLOOD PO2: 82.7 MMHG (ref 80–100)
ARTERIAL BLOOD TOTAL CO2: 28.8 MMOL/L (ref 23–27)
AST SERPL-CCNC: 16 U/L (ref 17–59)
BASE EXCESS BLDA CALC-SCNC: 2.9 MMOL/L
BILIRUB DIRECT SERPL-MCNC: 0.6 MG/DL (ref 0–0.4)
BILIRUB SERPL-MCNC: 0.7 MG/DL (ref 0.2–1.3)
BUN SERPL-MCNC: 16 MG/DL (ref 7–20)
CALCIUM: 9 MG/DL (ref 8.4–10.2)
CHLORIDE SERPL-SCNC: 105 MMOL/L (ref 98–107)
CO2 SERPL-SCNC: 30 MMOL/L (ref 22–30)
ERYTHROCYTE [DISTWIDTH] IN BLOOD BY AUTOMATED COUNT: 16 % (ref 11.5–14)
GLUCOSE SERPL-MCNC: 137 MG/DL (ref 75–110)
HCT VFR BLD CALC: 37.4 % (ref 37.9–51)
HGB BLD-MCNC: 12.2 G/DL (ref 13.5–17)
MCH RBC QN AUTO: 26.5 PG (ref 27–33.4)
MCHC RBC AUTO-ENTMCNC: 32.7 G/DL (ref 32–36)
MCV RBC AUTO: 81 FL (ref 80–97)
PLATELET # BLD: 267 10^3/UL (ref 150–450)
POTASSIUM SERPL-SCNC: 4.4 MMOL/L (ref 3.6–5)
PROT SERPL-MCNC: 7.2 G/DL (ref 6.3–8.2)
RBC # BLD AUTO: 4.62 10^6/UL (ref 4.35–5.55)
SAO2 % BLDA: 96.4 % (ref 94–98)
SODIUM SERPL-SCNC: 141.6 MMOL/L (ref 137–145)
WBC # BLD AUTO: 13.7 10^3/UL (ref 4–10.5)

## 2018-10-17 RX ADMIN — PROPOFOL PRN MLS/HR: 10 INJECTION, EMULSION INTRAVENOUS at 02:18

## 2018-10-17 RX ADMIN — Medication SCH ML: at 05:07

## 2018-10-17 RX ADMIN — BUDESONIDE SCH MG: 0.5 SUSPENSION RESPIRATORY (INHALATION) at 08:09

## 2018-10-17 RX ADMIN — LEVALBUTEROL HYDROCHLORIDE SCH MG: 1.25 SOLUTION RESPIRATORY (INHALATION) at 16:07

## 2018-10-17 RX ADMIN — BUDESONIDE SCH MG: 0.5 SUSPENSION RESPIRATORY (INHALATION) at 20:03

## 2018-10-17 RX ADMIN — MIDAZOLAM HYDROCHLORIDE PRN MLS/HR: 5 INJECTION, SOLUTION INTRAMUSCULAR; INTRAVENOUS at 02:17

## 2018-10-17 RX ADMIN — PROPOFOL PRN MLS/HR: 10 INJECTION, EMULSION INTRAVENOUS at 23:39

## 2018-10-17 RX ADMIN — PROPOFOL PRN MLS/HR: 10 INJECTION, EMULSION INTRAVENOUS at 13:27

## 2018-10-17 RX ADMIN — METOPROLOL TARTRATE SCH MG: 5 INJECTION, SOLUTION INTRAVENOUS at 19:45

## 2018-10-17 RX ADMIN — MIDAZOLAM HYDROCHLORIDE PRN MLS/HR: 5 INJECTION, SOLUTION INTRAMUSCULAR; INTRAVENOUS at 23:40

## 2018-10-17 RX ADMIN — IPRATROPIUM BROMIDE SCH MG: 0.5 SOLUTION RESPIRATORY (INHALATION) at 16:07

## 2018-10-17 RX ADMIN — LEVALBUTEROL HYDROCHLORIDE SCH MG: 1.25 SOLUTION RESPIRATORY (INHALATION) at 23:47

## 2018-10-17 RX ADMIN — MIDAZOLAM HYDROCHLORIDE PRN MLS/HR: 5 INJECTION, SOLUTION INTRAMUSCULAR; INTRAVENOUS at 18:50

## 2018-10-17 RX ADMIN — MIDAZOLAM HYDROCHLORIDE PRN MLS/HR: 5 INJECTION, SOLUTION INTRAMUSCULAR; INTRAVENOUS at 15:30

## 2018-10-17 RX ADMIN — MONTELUKAST SODIUM SCH MG: 10 TABLET, FILM COATED ORAL at 21:41

## 2018-10-17 RX ADMIN — METOPROLOL TARTRATE SCH MG: 5 INJECTION, SOLUTION INTRAVENOUS at 15:52

## 2018-10-17 RX ADMIN — METOPROLOL TARTRATE SCH MG: 5 INJECTION, SOLUTION INTRAVENOUS at 23:14

## 2018-10-17 RX ADMIN — LEVOFLOXACIN SCH MLS/HR: 750 INJECTION, SOLUTION INTRAVENOUS at 21:40

## 2018-10-17 RX ADMIN — ALBUTEROL SULFATE PRN MG: 2.5 SOLUTION RESPIRATORY (INHALATION) at 20:03

## 2018-10-17 RX ADMIN — PANTOPRAZOLE SODIUM SCH MG: 40 INJECTION, POWDER, FOR SOLUTION INTRAVENOUS at 10:37

## 2018-10-17 RX ADMIN — HEPARIN SODIUM SCH UNIT: 5000 INJECTION, SOLUTION INTRAVENOUS; SUBCUTANEOUS at 13:26

## 2018-10-17 RX ADMIN — Medication SCH ML: at 21:41

## 2018-10-17 RX ADMIN — PROPOFOL PRN MLS/HR: 10 INJECTION, EMULSION INTRAVENOUS at 07:45

## 2018-10-17 RX ADMIN — MIDAZOLAM HYDROCHLORIDE PRN MLS/HR: 5 INJECTION, SOLUTION INTRAMUSCULAR; INTRAVENOUS at 07:45

## 2018-10-17 RX ADMIN — METOCLOPRAMIDE SCH MLS/HR: 5 INJECTION, SOLUTION INTRAMUSCULAR; INTRAVENOUS at 17:09

## 2018-10-17 RX ADMIN — IPRATROPIUM BROMIDE SCH MG: 0.5 SOLUTION RESPIRATORY (INHALATION) at 23:47

## 2018-10-17 RX ADMIN — MORPHINE SULFATE PRN MG: 10 INJECTION INTRAMUSCULAR; INTRAVENOUS; SUBCUTANEOUS at 17:11

## 2018-10-17 RX ADMIN — PROPOFOL PRN MLS/HR: 10 INJECTION, EMULSION INTRAVENOUS at 17:10

## 2018-10-17 RX ADMIN — PROPOFOL PRN MLS/HR: 10 INJECTION, EMULSION INTRAVENOUS at 05:07

## 2018-10-17 RX ADMIN — HEPARIN SODIUM SCH UNIT: 5000 INJECTION, SOLUTION INTRAVENOUS; SUBCUTANEOUS at 21:40

## 2018-10-17 RX ADMIN — IPRATROPIUM BROMIDE SCH MG: 0.5 SOLUTION RESPIRATORY (INHALATION) at 00:32

## 2018-10-17 RX ADMIN — METHYLPREDNISOLONE SODIUM SUCCINATE SCH MG: 125 INJECTION, POWDER, FOR SOLUTION INTRAMUSCULAR; INTRAVENOUS at 21:40

## 2018-10-17 RX ADMIN — HEPARIN SODIUM SCH UNIT: 5000 INJECTION, SOLUTION INTRAVENOUS; SUBCUTANEOUS at 05:09

## 2018-10-17 RX ADMIN — Medication SCH ML: at 13:26

## 2018-10-17 RX ADMIN — METHYLPREDNISOLONE SODIUM SUCCINATE SCH MG: 125 INJECTION, POWDER, FOR SOLUTION INTRAMUSCULAR; INTRAVENOUS at 05:09

## 2018-10-17 RX ADMIN — METHYLPREDNISOLONE SODIUM SUCCINATE SCH MG: 125 INJECTION, POWDER, FOR SOLUTION INTRAMUSCULAR; INTRAVENOUS at 13:25

## 2018-10-17 RX ADMIN — PROPOFOL PRN MLS/HR: 10 INJECTION, EMULSION INTRAVENOUS at 20:32

## 2018-10-17 RX ADMIN — LEVALBUTEROL HYDROCHLORIDE SCH MG: 1.25 SOLUTION RESPIRATORY (INHALATION) at 08:10

## 2018-10-17 RX ADMIN — LEVALBUTEROL HYDROCHLORIDE SCH MG: 1.25 SOLUTION RESPIRATORY (INHALATION) at 00:32

## 2018-10-17 RX ADMIN — IPRATROPIUM BROMIDE SCH MG: 0.5 SOLUTION RESPIRATORY (INHALATION) at 08:09

## 2018-10-17 NOTE — RADIOLOGY REPORT (SQ)
EXAM DESCRIPTION: X-ray single view chest.



CLINICAL HISTORY: 38 years Male, resp failure



COMPARISON: Prior portable chest x-ray performed on 10/15/2018.



TECHNIQUE: Single portable view of the chest performed on

10/17/2018 at 6:07 AM



FINDINGS: 

The lungs are well expanded. There is a subtle curvilinear

opacity projecting over the left inferolateral hemithorax likely

due to to overlying artifact. A basilar pneumothorax is

considered less likely. 



The cardiac silhouette is normal in size and configuration.



The mediastinal contours are normal.



No acute osseous abnormality is identified.



No focal soft tissue abnormalities are seen.



Lines and tubes:  The tip of the endotracheal tube terminates at

the level of the clavicular heads. The feeding tube tip extends

below the diaphragm.



IMPRESSION:



1. Subtle curvilinear opacity projecting over the left

inferolateral hemithorax likely due to overlying artifact. A

basilar pneumothorax is considered less likely.

2. Stable endotracheal tube.

3. New feeding tube which extends below the diaphragm.

4. No definite acute intrathoracic disease.

## 2018-10-17 NOTE — PDOC PROGRESS REPORT
Subjective


Progress Note for:: 10/17/18


Subjective:: 


ELEONORA CASTRO is a 38 year old male who presented to the emergency room with 

a 3-day history of progressively worsening dyspnea despite taking 60 mg of 

prednisone that he had at home for use when he experiences an exacerbation of 

his asthma.  Mr. Castro has a past medical history of asthma, bronchitis, 

tobacco and polysubstance abuse.  In the emergency room he was found to have 

acute respiratory decompensation and was rapidly intubated.  He was 

subsequently started on Solu-Medrol, magnesium, albuterol by the emergency room 

provider.  His ABG revealed a PCO2 of 70 and as he was maintained on the 

ventilator and transferred to the ICU for care by the hospitalist service.





10/16/18:


Patient is unable to provide subjective input since he is sedated and on a 

ventilator.  His examination reveals mild end expiratory wheezing and a 

minimally prolongated expiratory phase.  He will be maintained on the 

ventilator for the rest the day at the direction of Dr. Samuel who is seeing 

the patient in consultation.  Hopefully Mr. Castro can be extubated tomorrow.





10/17/18:


Mtahieu did not tolerate an attempt to wean him from assisted ventilatory 

support today.  His blood gases were good with a normal pH, PO2 and PCO2.  

However once patient's ventilatory assistance was reduced he became very 

tachypneic and tachycardic and his ventilator support had to be reinstated to 

its previous level.  Additional adjustments were made by Dr. Samuel and his 

blood gases will be rechecked in another attempt at withdrawing ventilatory 

support will be made again tomorrow if appropriate.  Therefore Mr. Castro is 

unable to participate in providing any subjective information as he is sedated 

and intubated.


Reason For Visit: 


ASTHMA EXACERBATION,ACUTE RESPIRATORY FAILURE,








Physical Exam


Vital Signs: 


 











Temp Pulse Resp BP Pulse Ox


 


 99.5 F   69   23 H  160/99 H  96 


 


 10/17/18 13:36  10/17/18 13:59  10/17/18 13:59  10/17/18 13:59  10/17/18 13:59








 Intake & Output











 10/15/18 10/16/18 10/17/18





 23:59 23:59 23:59


 


Intake Total 1000 647 427


 


Output Total 210 2955 1810


 


Balance 790 2308 -1386


 


Weight 78 kg 78 kg 75.1 kg











General appearance: PRESENT: well-developed, well-nourished, other - Sedated 

and intubated for mechanical ventilation


Head exam: PRESENT: atraumatic, normocephalic


Eye exam: ABSENT: conjunctival injection, scleral icterus


Ear exam: PRESENT: normal external ear exam.  ABSENT: drainage


Mouth exam: PRESENT: moist, other - Endotracheal tube in good position


Neck exam: ABSENT: thyromegaly, tracheal deviation


Respiratory exam: PRESENT: symmetrical, other - On mechanical ventilation


Cardiovascular exam: PRESENT: RRR, tachycardia.  ABSENT: clicks, gallop, rubs


Pulses: PRESENT: normal radial pulses, normal dorsalis pedis pul


Vascular exam: PRESENT: normal capillary refill.  ABSENT: pallor


GI/Abdominal exam: PRESENT: normal bowel sounds, soft


Rectal exam: PRESENT: deferred


Extremities exam: ABSENT: joint swelling, pedal edema


Musculoskeletal exam: ABSENT: deformity, dislocation


Neurological exam: PRESENT: reflexes normal, other - Sedated


Psychiatric exam: PRESENT: other - Sedated


Skin exam: ABSENT: jaundice, rash, urticaria





Results


Laboratory Results: 


 





 10/17/18 04:06 





 10/17/18 04:06 





 











  10/17/18 10/17/18 10/17/18





  04:06 04:06 04:11


 


WBC   13.7 H 


 


RBC   4.62 


 


Hgb   12.2 L 


 


Hct   37.4 L 


 


MCV   81 


 


MCH   26.5 L 


 


MCHC   32.7 


 


RDW   16.0 H 


 


Plt Count   267 


 


Carbonic Acid   


 


HCO3/H2CO3 Ratio   


 


ABG pH   


 


ABG pCO2   


 


ABG pO2   


 


ABG HCO3   


 


ABG O2 Saturation   


 


ABG Base Excess   


 


FiO2   


 


Sodium  141.6  


 


Potassium  4.4  


 


Chloride  105  


 


Carbon Dioxide  30  


 


Anion Gap  7  


 


BUN  16  


 


Creatinine  0.92  


 


Est GFR ( Amer)  > 60  


 


Est GFR (Non-Af Amer)  > 60  


 


Glucose  137 H  


 


Calcium  9.0  


 


Magnesium  2.6 H  


 


Total Bilirubin  0.7  


 


AST  16 L  


 


ALT  26  


 


Alkaline Phosphatase  65  


 


Total Protein  7.2  


 


Albumin  4.3  


 


TSH    0.03 L














  10/17/18





  04:40


 


WBC 


 


RBC 


 


Hgb 


 


Hct 


 


MCV 


 


MCH 


 


MCHC 


 


RDW 


 


Plt Count 


 


Carbonic Acid  1.26


 


HCO3/H2CO3 Ratio  21:1


 


ABG pH  7.43


 


ABG pCO2  42.0


 


ABG pO2  82.7


 


ABG HCO3  27.5 H


 


ABG O2 Saturation  96.4


 


ABG Base Excess  2.9


 


FiO2  45%


 


Sodium 


 


Potassium 


 


Chloride 


 


Carbon Dioxide 


 


Anion Gap 


 


BUN 


 


Creatinine 


 


Est GFR ( Amer) 


 


Est GFR (Non-Af Amer) 


 


Glucose 


 


Calcium 


 


Magnesium 


 


Total Bilirubin 


 


AST 


 


ALT 


 


Alkaline Phosphatase 


 


Total Protein 


 


Albumin 


 


TSH 








 











  10/16/18 10/16/18 10/16/18





  04:12 05:32 05:32


 


Creatine Kinase   304 H 


 


CK-MB (CK-2)    3.56


 


Troponin I    < 0.012


 


NT-Pro-B Natriuret Pep  191 H  














  10/16/18 10/16/18 10/16/18





  11:19 11:19 17:25


 


Creatine Kinase  272 H   239 H


 


CK-MB (CK-2)   3.86 


 


Troponin I   < 0.012 


 


NT-Pro-B Natriuret Pep   














  10/16/18





  17:25


 


Creatine Kinase 


 


CK-MB (CK-2)  3.28


 


Troponin I  < 0.012


 


NT-Pro-B Natriuret Pep 











Impressions: 


 





Chest X-Ray  10/17/18 06:00


IMPRESSION:


 


1. Subtle curvilinear opacity projecting over the left


inferolateral hemithorax likely due to overlying artifact. A


basilar pneumothorax is considered less likely.


2. Stable endotracheal tube.


3. New feeding tube which extends below the diaphragm.


4. No definite acute intrathoracic disease.


 














Assessment & Plan





- Diagnosis


(1) Acute respiratory failure with hypoxia and hypercapnia


Is this a current diagnosis for this admission?: Yes   


Plan: 


10/16/18: 


Patient is admitted to the ICU and treated with mechanical ventilation via 

endotracheal tube.  Dr. Samuel is consulting and he and I have discussed the 

case extensively today.  Patient's chest x-ray has been reviewed personally by 

myself.  Additionally I have ordered daily lab work of a venous blood gases and 

a daily chest x-ray while the patient is on a ventilator.





10/17/18:


Mr. Castro continues to be on mechanical ventilation.  Dr. Samuel and I have 

discussed the case today.  Patient's chest x-ray has been reviewed personally 

by myself.  Additionally I have ordered daily lab work of CBC, BMP, Mg, VBG's 

and a daily chest x-ray while the patient is on a ventilator.








(2) Acute severe exacerbation of asthma


Is this a current diagnosis for this admission?: Yes   


Plan: 


10/16/18:


Patient will be treated with intravenous steroids utilizing Solu-Medrol and 

aggressive pulmonary toilet utilizing levalbuterol, ipratropium, budesonide and 

Mucomyst.  He will be followed with daily lab work including a CBC, BMP, 

magnesium level as well as a daily chest x-ray.





10/17/18:


Eleonora will be contined on his current therapy, with a decrease in his solu-

medrol dosage. CXR: show persistent bilateral pulmonary hyperexpansion/

hyperaeration. Daily labs and x-rays have been ordered.








(3) Tachycardia with hypertension


Is this a current diagnosis for this admission?: Yes   


Plan: 


Problem worsened by attempted weaning off ventilator. Continues to be elevated 

@ 160/96 and 106. Beta blocakde will be initiated with metoprolol IV. Also prn 

dosage of IV morphine 2 mg q1hr will be available for use at nursing discretion 

for pain or discomfort that may cause this problem.








- Time


Time Spent with patient: 35 or more minutes


Anticipated discharge: Home

## 2018-10-17 NOTE — PDOC PROGRESS REPORT
Subjective


Progress Note for:: 10/17/18


Subjective:: 





remains intubated


Reason For Visit: 


ASTHMA EXACERBATION,ACUTE RESPIRATORY FAILURE,








Physical Exam


Vital Signs: 


 











Temp Pulse Resp BP Pulse Ox


 


 99.5 F   85   29 H  154/94 H  96 


 


 10/17/18 08:00  10/17/18 08:12  10/17/18 08:12  10/17/18 07:56  10/17/18 08:12








 Intake & Output











 10/16/18 10/17/18 10/18/18





 06:59 06:59 06:59


 


Intake Total 1120 693 161


 


Output Total 1090 3275 400


 


Balance 30 -6392 -654


 


Weight 78 kg 75.1 kg 











General appearance: PRESENT: no acute distress, disheveled, thin, well-developed

, well-nourished.  ABSENT: cooperative


Head exam: PRESENT: atraumatic, normocephalic


Eye exam: PRESENT: conjunctiva pale.  ABSENT: nystagmus, periorbital swelling, 

scleral icterus


Mouth exam: PRESENT: dry mucosa, neck supple, tongue midline, other - ET tube 

in place


Neck exam: ABSENT: carotid bruit, JVD, lymphadenopathy, thyromegaly, tracheal 

deviation, tracheostomy


Respiratory exam: PRESENT: decreased breath sounds, prolonged expiratory phas, 

rhonchi, symmetrical, tachypnea, unlabored, wheezes.  ABSENT: retraction, 

stridor


Cardiovascular exam: PRESENT: RRR, +S1, +S2, tachycardia


Pulses: PRESENT: normal radial pulses


GI/Abdominal exam: PRESENT: soft.  ABSENT: tenderness


Gentrourinary exam: PRESENT: indwelling catheter


Extremities exam: ABSENT: calf tenderness, clubbing, joint swelling


Musculoskeletal exam: ABSENT: ambulatory, deformity, dislocation


Neurological exam: ABSENT: awake


Skin exam: PRESENT: dry, warm





Results


Laboratory Results: 


 





 10/17/18 04:06 





 10/17/18 04:06 





 











  10/16/18 10/16/18 10/16/18





  11:19 11:19 12:56


 


WBC   10.9 H 


 


RBC   4.40 


 


Hgb   12.0 L 


 


Hct   35.4 L 


 


MCV   81 


 


MCH   27.2 


 


MCHC   33.8 


 


RDW   15.8 H 


 


Plt Count   276 


 


Seg Neutrophils %   Not Reportable 


 


Lymphocytes %   Not Reportable 


 


Monocytes %   Not Reportable 


 


Eosinophils %   Not Reportable 


 


Basophils %   Not Reportable 


 


Absolute Neutrophils   Not Reportable 


 


Absolute Lymphocytes   Not Reportable 


 


Absolute Monocytes   Not Reportable 


 


Absolute Eosinophils   Not Reportable 


 


Absolute Basophils   Not Reportable 


 


Carbonic Acid    1.43 H


 


HCO3/H2CO3 Ratio    20:1


 


ABG pH    7.40


 


ABG pCO2    47.5 H


 


ABG pO2    70.9 L


 


ABG HCO3    28.8 H


 


ABG O2 Saturation    94.1


 


ABG Base Excess    3.3


 


FiO2    45%


 


Sodium  141.8  


 


Potassium  4.3  


 


Chloride  104  


 


Carbon Dioxide  28  


 


Anion Gap  10  


 


BUN  13  


 


Creatinine  0.86  


 


Est GFR ( Amer)  > 60  


 


Est GFR (Non-Af Amer)  > 60  


 


Glucose  142 H  


 


Calcium  9.6  


 


Magnesium  2.2  


 


Total Bilirubin   


 


AST   


 


ALT   


 


Alkaline Phosphatase   


 


Total Protein   


 


Albumin   


 


TSH   














  10/17/18 10/17/18 10/17/18





  04:06 04:06 04:11


 


WBC   13.7 H 


 


RBC   4.62 


 


Hgb   12.2 L 


 


Hct   37.4 L 


 


MCV   81 


 


MCH   26.5 L 


 


MCHC   32.7 


 


RDW   16.0 H 


 


Plt Count   267 


 


Seg Neutrophils %   


 


Lymphocytes %   


 


Monocytes %   


 


Eosinophils %   


 


Basophils %   


 


Absolute Neutrophils   


 


Absolute Lymphocytes   


 


Absolute Monocytes   


 


Absolute Eosinophils   


 


Absolute Basophils   


 


Carbonic Acid   


 


HCO3/H2CO3 Ratio   


 


ABG pH   


 


ABG pCO2   


 


ABG pO2   


 


ABG HCO3   


 


ABG O2 Saturation   


 


ABG Base Excess   


 


FiO2   


 


Sodium  141.6  


 


Potassium  4.4  


 


Chloride  105  


 


Carbon Dioxide  30  


 


Anion Gap  7  


 


BUN  16  


 


Creatinine  0.92  


 


Est GFR ( Amer)  > 60  


 


Est GFR (Non-Af Amer)  > 60  


 


Glucose  137 H  


 


Calcium  9.0  


 


Magnesium  2.6 H  


 


Total Bilirubin  0.7  


 


AST  16 L  


 


ALT  26  


 


Alkaline Phosphatase  65  


 


Total Protein  7.2  


 


Albumin  4.3  


 


TSH    0.03 L














  10/17/18





  04:40


 


WBC 


 


RBC 


 


Hgb 


 


Hct 


 


MCV 


 


MCH 


 


MCHC 


 


RDW 


 


Plt Count 


 


Seg Neutrophils % 


 


Lymphocytes % 


 


Monocytes % 


 


Eosinophils % 


 


Basophils % 


 


Absolute Neutrophils 


 


Absolute Lymphocytes 


 


Absolute Monocytes 


 


Absolute Eosinophils 


 


Absolute Basophils 


 


Carbonic Acid  1.26


 


HCO3/H2CO3 Ratio  21:1


 


ABG pH  7.43


 


ABG pCO2  42.0


 


ABG pO2  82.7


 


ABG HCO3  27.5 H


 


ABG O2 Saturation  96.4


 


ABG Base Excess  2.9


 


FiO2  45%


 


Sodium 


 


Potassium 


 


Chloride 


 


Carbon Dioxide 


 


Anion Gap 


 


BUN 


 


Creatinine 


 


Est GFR ( Amer) 


 


Est GFR (Non-Af Amer) 


 


Glucose 


 


Calcium 


 


Magnesium 


 


Total Bilirubin 


 


AST 


 


ALT 


 


Alkaline Phosphatase 


 


Total Protein 


 


Albumin 


 


TSH 








 











  10/16/18 10/16/18 10/16/18





  04:12 05:32 05:32


 


Creatine Kinase   304 H 


 


CK-MB (CK-2)    3.56


 


Troponin I    < 0.012


 


NT-Pro-B Natriuret Pep  191 H  














  10/16/18 10/16/18 10/16/18





  11:19 11:19 17:25


 


Creatine Kinase  272 H   239 H


 


CK-MB (CK-2)   3.86 


 


Troponin I   < 0.012 


 


NT-Pro-B Natriuret Pep   














  10/16/18





  17:25


 


Creatine Kinase 


 


CK-MB (CK-2)  3.28


 


Troponin I  < 0.012


 


NT-Pro-B Natriuret Pep 











Impressions: 


 





Chest X-Ray  10/17/18 06:00


IMPRESSION:


 


1. Subtle curvilinear opacity projecting over the left


inferolateral hemithorax likely due to overlying artifact. A


basilar pneumothorax is considered less likely.


2. Stable endotracheal tube.


3. New feeding tube which extends below the diaphragm.


4. No definite acute intrathoracic disease.


 














Assessment & Plan





- Diagnosis


(1) Acute respiratory failure


Qualifiers: 


   Respiratory failure complication: hypoxia   Qualified Code(s): J96.01 - 

Acute respiratory failure with hypoxia   


Is this a current diagnosis for this admission?: Yes   


Plan: 


Minute volume 14-15 L/min








(2) Acute severe exacerbation of asthma


Is this a current diagnosis for this admission?: Yes   


Plan: 


Slightly more air movement but still very coarse airway sounds








- Time


Total Critical Time (Minutes): 40

## 2018-10-18 LAB
ANION GAP SERPL CALC-SCNC: 10 MMOL/L (ref 5–19)
ARTERIAL BLOOD FIO2: (no result)
ARTERIAL BLOOD H2CO3: 1.18 MMOL/L (ref 1.05–1.35)
ARTERIAL BLOOD HCO3: 27.8 MMOL/L (ref 20–24)
ARTERIAL BLOOD PCO2: 39.2 MMHG (ref 35–45)
ARTERIAL BLOOD PH: 7.47 (ref 7.35–7.45)
ARTERIAL BLOOD PO2: 66 MMHG (ref 80–100)
ARTERIAL BLOOD TOTAL CO2: 29 MMOL/L (ref 23–27)
BASE EXCESS BLDA CALC-SCNC: 4 MMOL/L
BUN SERPL-MCNC: 21 MG/DL (ref 7–20)
CALCIUM: 9 MG/DL (ref 8.4–10.2)
CHLORIDE SERPL-SCNC: 105 MMOL/L (ref 98–107)
CO2 SERPL-SCNC: 26 MMOL/L (ref 22–30)
ERYTHROCYTE [DISTWIDTH] IN BLOOD BY AUTOMATED COUNT: 16.1 % (ref 11.5–14)
GLUCOSE SERPL-MCNC: 113 MG/DL (ref 75–110)
HCT VFR BLD CALC: 40.5 % (ref 37.9–51)
HGB BLD-MCNC: 13.1 G/DL (ref 13.5–17)
MCH RBC QN AUTO: 26.2 PG (ref 27–33.4)
MCHC RBC AUTO-ENTMCNC: 32.5 G/DL (ref 32–36)
MCV RBC AUTO: 81 FL (ref 80–97)
PLATELET # BLD: 244 10^3/UL (ref 150–450)
POTASSIUM SERPL-SCNC: 4.7 MMOL/L (ref 3.6–5)
RBC # BLD AUTO: 5 10^6/UL (ref 4.35–5.55)
SAO2 % BLDA: 94.2 % (ref 94–98)
SODIUM SERPL-SCNC: 140.8 MMOL/L (ref 137–145)
WBC # BLD AUTO: 14 10^3/UL (ref 4–10.5)

## 2018-10-18 RX ADMIN — PROPOFOL PRN MLS/HR: 10 INJECTION, EMULSION INTRAVENOUS at 17:39

## 2018-10-18 RX ADMIN — METHYLPREDNISOLONE SODIUM SUCCINATE SCH MG: 125 INJECTION, POWDER, FOR SOLUTION INTRAMUSCULAR; INTRAVENOUS at 05:21

## 2018-10-18 RX ADMIN — LEVALBUTEROL HYDROCHLORIDE SCH MG: 1.25 SOLUTION RESPIRATORY (INHALATION) at 15:28

## 2018-10-18 RX ADMIN — DEXTROSE, SODIUM CHLORIDE, AND POTASSIUM CHLORIDE PRN MLS/HR: 5; .45; .15 INJECTION INTRAVENOUS at 13:05

## 2018-10-18 RX ADMIN — ACETYLCYSTEINE SCH: 200 INHALANT RESPIRATORY (INHALATION) at 10:57

## 2018-10-18 RX ADMIN — METOPROLOL TARTRATE SCH MG: 5 INJECTION, SOLUTION INTRAVENOUS at 17:41

## 2018-10-18 RX ADMIN — MONTELUKAST SODIUM SCH MG: 10 TABLET, FILM COATED ORAL at 23:01

## 2018-10-18 RX ADMIN — IPRATROPIUM BROMIDE SCH MG: 0.5 SOLUTION RESPIRATORY (INHALATION) at 15:28

## 2018-10-18 RX ADMIN — HEPARIN SODIUM SCH UNIT: 5000 INJECTION, SOLUTION INTRAVENOUS; SUBCUTANEOUS at 21:36

## 2018-10-18 RX ADMIN — Medication SCH ML: at 13:09

## 2018-10-18 RX ADMIN — METOPROLOL TARTRATE SCH: 5 INJECTION, SOLUTION INTRAVENOUS at 10:47

## 2018-10-18 RX ADMIN — METHYLPREDNISOLONE SODIUM SUCCINATE SCH MG: 125 INJECTION, POWDER, FOR SOLUTION INTRAMUSCULAR; INTRAVENOUS at 21:36

## 2018-10-18 RX ADMIN — ACETAMINOPHEN PRN MG: 325 TABLET ORAL at 23:00

## 2018-10-18 RX ADMIN — METOPROLOL TARTRATE SCH MG: 5 INJECTION, SOLUTION INTRAVENOUS at 13:03

## 2018-10-18 RX ADMIN — ALBUTEROL SULFATE PRN MG: 2.5 SOLUTION RESPIRATORY (INHALATION) at 20:04

## 2018-10-18 RX ADMIN — IPRATROPIUM BROMIDE SCH MG: 0.5 SOLUTION RESPIRATORY (INHALATION) at 07:49

## 2018-10-18 RX ADMIN — MIDAZOLAM HYDROCHLORIDE PRN MLS/HR: 5 INJECTION, SOLUTION INTRAMUSCULAR; INTRAVENOUS at 13:05

## 2018-10-18 RX ADMIN — HEPARIN SODIUM SCH UNIT: 5000 INJECTION, SOLUTION INTRAVENOUS; SUBCUTANEOUS at 05:21

## 2018-10-18 RX ADMIN — ERTAPENEM SODIUM SCH MLS/HR: 1 INJECTION, POWDER, LYOPHILIZED, FOR SOLUTION INTRAMUSCULAR; INTRAVENOUS at 17:37

## 2018-10-18 RX ADMIN — Medication SCH ML: at 05:23

## 2018-10-18 RX ADMIN — METOCLOPRAMIDE SCH MLS/HR: 5 INJECTION, SOLUTION INTRAMUSCULAR; INTRAVENOUS at 17:37

## 2018-10-18 RX ADMIN — MORPHINE SULFATE PRN MG: 10 INJECTION INTRAMUSCULAR; INTRAVENOUS; SUBCUTANEOUS at 04:29

## 2018-10-18 RX ADMIN — BUDESONIDE SCH MG: 0.5 SUSPENSION RESPIRATORY (INHALATION) at 20:04

## 2018-10-18 RX ADMIN — LEVOFLOXACIN SCH MLS/HR: 750 INJECTION, SOLUTION INTRAVENOUS at 21:35

## 2018-10-18 RX ADMIN — PROPOFOL PRN MLS/HR: 10 INJECTION, EMULSION INTRAVENOUS at 10:48

## 2018-10-18 RX ADMIN — PROPOFOL PRN MLS/HR: 10 INJECTION, EMULSION INTRAVENOUS at 06:29

## 2018-10-18 RX ADMIN — ACETYLCYSTEINE SCH MG: 200 INHALANT RESPIRATORY (INHALATION) at 20:04

## 2018-10-18 RX ADMIN — METOPROLOL TARTRATE SCH MG: 5 INJECTION, SOLUTION INTRAVENOUS at 05:21

## 2018-10-18 RX ADMIN — PROPOFOL PRN MLS/HR: 10 INJECTION, EMULSION INTRAVENOUS at 19:48

## 2018-10-18 RX ADMIN — PROPOFOL PRN MLS/HR: 10 INJECTION, EMULSION INTRAVENOUS at 23:00

## 2018-10-18 RX ADMIN — METHYLPREDNISOLONE SODIUM SUCCINATE SCH MG: 125 INJECTION, POWDER, FOR SOLUTION INTRAMUSCULAR; INTRAVENOUS at 13:04

## 2018-10-18 RX ADMIN — MIDAZOLAM HYDROCHLORIDE PRN MLS/HR: 5 INJECTION, SOLUTION INTRAMUSCULAR; INTRAVENOUS at 19:47

## 2018-10-18 RX ADMIN — HEPARIN SODIUM SCH UNIT: 5000 INJECTION, SOLUTION INTRAVENOUS; SUBCUTANEOUS at 13:04

## 2018-10-18 RX ADMIN — MORPHINE SULFATE PRN MG: 10 INJECTION INTRAMUSCULAR; INTRAVENOUS; SUBCUTANEOUS at 21:36

## 2018-10-18 RX ADMIN — Medication SCH ML: at 21:38

## 2018-10-18 RX ADMIN — BUDESONIDE SCH MG: 0.5 SUSPENSION RESPIRATORY (INHALATION) at 07:49

## 2018-10-18 RX ADMIN — PROPOFOL PRN MLS/HR: 10 INJECTION, EMULSION INTRAVENOUS at 13:04

## 2018-10-18 RX ADMIN — PROPOFOL PRN MLS/HR: 10 INJECTION, EMULSION INTRAVENOUS at 03:23

## 2018-10-18 RX ADMIN — LEVALBUTEROL HYDROCHLORIDE SCH MG: 1.25 SOLUTION RESPIRATORY (INHALATION) at 07:49

## 2018-10-18 RX ADMIN — METOPROLOL TARTRATE SCH MG: 5 INJECTION, SOLUTION INTRAVENOUS at 20:19

## 2018-10-18 RX ADMIN — METOPROLOL TARTRATE SCH MG: 5 INJECTION, SOLUTION INTRAVENOUS at 23:37

## 2018-10-18 RX ADMIN — PANTOPRAZOLE SODIUM SCH MG: 40 INJECTION, POWDER, FOR SOLUTION INTRAVENOUS at 08:30

## 2018-10-18 RX ADMIN — MIDAZOLAM HYDROCHLORIDE PRN MLS/HR: 5 INJECTION, SOLUTION INTRAMUSCULAR; INTRAVENOUS at 06:29

## 2018-10-18 NOTE — PDOC PROGRESS REPORT
Subjective


Progress Note for:: 10/18/18


Subjective:: 


ELEONORA CASTRO is a 38 year old male who presented to the emergency room with 

a 3-day history of progressively worsening dyspnea despite taking 60 mg of 

prednisone that he had at home for use when he experiences an exacerbation of 

his asthma.  Mr. Castro has a past medical history of asthma, bronchitis, 

tobacco and polysubstance abuse.  In the emergency room he was found to have 

acute respiratory decompensation and was rapidly intubated.  He was 

subsequently started on Solu-Medrol, magnesium, albuterol by the emergency room 

provider.  His ABG revealed a PCO2 of 70 and as he was maintained on the 

ventilator and transferred to the ICU for care by the hospitalist service.





10/16/18:


Patient is unable to provide subjective input since he is sedated and on a 

ventilator.  His examination reveals mild end expiratory wheezing and a 

minimally prolongated expiratory phase.  He will be maintained on the 

ventilator for the rest the day at the direction of Dr. Samuel who is seeing 

the patient in consultation.  Hopefully Mr. Castro can be extubated tomorrow.





10/17/18:


Eleonora did not tolerate an attempt to wean him from assisted ventilatory 

support today.  His blood gases were good with a normal pH, PO2 and PCO2.  

However once patient's ventilatory assistance was reduced he became very 

tachypneic and tachycardic and his ventilator support had to be reinstated to 

its previous level.  Additional adjustments were made by Dr. Samuel and his 

blood gases will be rechecked in another attempt at withdrawing ventilatory 

support will be made again tomorrow if appropriate.  Therefore Mr. Castro is 

unable to participate in providing any subjective information as he is sedated 

and intubated.





10/18/18:


Eleonora his again been unable to tolerate weaning from ventilatory assistance.  

His blood gases today showed a drop in his PO2 and thus a CTA of the chest was 

performed which was negative for pulmonary embolus.  I have personally reviewed 

these films and have observed a left posterior medial basilar lobar/segmental 

pneumonia to be present.  Antibiotic therapy will be initiated in consultation 

with Dr. Samuel.  Eleonora continues to be sedated to the point where he is 

unable to participate in providing any subjective input.


Reason For Visit: 


ASTHMA EXACERBATION,ACUTE RESPIRATORY FAILURE,








Physical Exam


Vital Signs: 


 











Temp Pulse Resp BP Pulse Ox


 


 99.9 F   84   24 H  154/99 H  98 


 


 10/18/18 13:03  10/18/18 08:27  10/18/18 13:03  10/18/18 13:03  10/18/18 13:03








 Intake & Output











 10/16/18 10/17/18 10/18/18





 23:59 23:59 23:59


 


Intake Total 797 3062.2 1712.2


 


Output Total 2955 2885 2750


 


Balance -2158 177.2 -1037.8


 


Weight 78 kg 75.1 kg 74 kg











General appearance: PRESENT: well-developed, well-nourished, other - Sedated 

and intubated for mechanical ventilation.


Head exam: PRESENT: atraumatic, normocephalic


Eye exam: PRESENT: conjunctiva pink.  ABSENT: periorbital swelling, scleral 

icterus


Ear exam: PRESENT: normal external ear exam.  ABSENT: bleeding, drainage


Mouth exam: PRESENT: neck supple, other - Excellent endotracheal tube position.


Neck exam: ABSENT: thyromegaly, tracheal deviation


Respiratory exam: PRESENT: decreased breath sounds - Mildly decreased breath 

sounds throughout all fields, prolonged expiratory phas - Mild to moderately 

prolonged expiratory phase, symmetrical, wheezes - Expiratory wheezes present 

in all fields, other - Mechanically ventilated via endotracheal tube.


Cardiovascular exam: PRESENT: bradycardia - Desired bradycardia in the range of 

50-60 is noted indicating excellent beta-blockade effect and the patient has 

improved control of his blood pressure., RRR.  ABSENT: clicks, diastolic murmur

, gallop, rubs, systolic murmur


Vascular exam: PRESENT: normal capillary refill.  ABSENT: pallor


GI/Abdominal exam: PRESENT: normal bowel sounds, soft


Rectal exam: PRESENT: deferred


Extremities exam: ABSENT: joint swelling, pedal edema


Musculoskeletal exam: ABSENT: deformity, dislocation


Neurological exam: PRESENT: other - Sedated for ventilation


Psychiatric exam: PRESENT: other - [id number] for ventilation


Skin exam: ABSENT: jaundice, rash, urticaria





Results


Laboratory Results: 


 





 10/18/18 03:49 





 10/18/18 03:49 





 











  10/18/18 10/18/18 10/18/18





  03:49 03:49 04:40


 


WBC  14.0 H  


 


RBC  5.00  


 


Hgb  13.1 L  


 


Hct  40.5  


 


MCV  81  


 


MCH  26.2 L  


 


MCHC  32.5  


 


RDW  16.1 H  


 


Plt Count  244  


 


Carbonic Acid    1.18


 


HCO3/H2CO3 Ratio    23:1


 


ABG pH    7.47 H


 


ABG pCO2    39.2


 


ABG pO2    66.0 L


 


ABG HCO3    27.8 H


 


ABG O2 Saturation    94.2


 


ABG Base Excess    4.0


 


FiO2    45%


 


Sodium   140.8 


 


Potassium   4.7 


 


Chloride   105 


 


Carbon Dioxide   26 


 


Anion Gap   10 


 


BUN   21 H 


 


Creatinine   0.88 


 


Est GFR ( Amer)   > 60 


 


Est GFR (Non-Af Amer)   > 60 


 


Glucose   113 H 


 


Calcium   9.0 


 


Magnesium   2.5 H 








 





10/16/18 11:35   Tracheal Aspirate   Gram Stain - Final


10/16/18 11:35   Tracheal Aspirate   Sputum Culture - Final


                            NORMAL ADRIANA





 











  10/16/18 10/16/18 10/16/18





  04:12 05:32 05:32


 


Creatine Kinase   304 H 


 


CK-MB (CK-2)    3.56


 


Troponin I    < 0.012


 


NT-Pro-B Natriuret Pep  191 H  














  10/16/18 10/16/18 10/16/18





  11:19 11:19 17:25


 


Creatine Kinase  272 H   239 H


 


CK-MB (CK-2)   3.86 


 


Troponin I   < 0.012 


 


NT-Pro-B Natriuret Pep   














  10/16/18





  17:25


 


Creatine Kinase 


 


CK-MB (CK-2)  3.28


 


Troponin I  < 0.012


 


NT-Pro-B Natriuret Pep 











Impressions: 


 





Chest X-Ray  10/18/18 06:00


IMPRESSION:  Collapse and consolidation in the left lower lobe worrisome for 

pneumonia.


Endotracheal tube tip 7 cm above the nikunj, tube tip is at the level of the 

clavicular heads


 








Chest/Abdomen CTA  10/18/18 09:15


IMPRESSION:  No PE.  Bilateral pneumonia.


 














Assessment & Plan





- Diagnosis


(1) Acute respiratory failure with hypoxia and hypercapnia


Is this a current diagnosis for this admission?: Yes   


Plan: 


10/16/18: 


Patient is admitted to the ICU and treated with mechanical ventilation via 

endotracheal tube.  Dr. Samuel is consulting and he and I have discussed the 

case extensively today.  Patient's chest x-ray has been reviewed personally by 

myself.  Additionally I have ordered daily lab work of a venous blood gases and 

a daily chest x-ray while the patient is on a ventilator.





10/17/18:


Mr. Castro continues to be on mechanical ventilation.  Dr. Samuel and I have 

discussed the case today.  Patient's chest x-ray has been reviewed personally 

by myself.  Additionally I have ordered daily lab work of CBC, BMP, Mg, VBG's 

and a daily chest x-ray while the patient is on a ventilator.





10/18/18:


Mr. Castro continues to require mechanical ventilation having failed efforts at 

withdrawing ventilatory assistance again today.  His chest x-ray is unchanged 

but a CTA of his chest revealed a left lower lobe posterior medial basilar 

segmental/lobar pneumonia.  This is felt to be most likely responsible for his 

increased hypoxemia on today's blood gas evaluation.  We will continue daily 

lab work as described above with daily chest x-rays.  He will be started on 

additional IV antibiotic therapy in conjunction with consultation with Dr. Samuel.








(2) Acute severe exacerbation of asthma


Is this a current diagnosis for this admission?: Yes   


Plan: 


10/16/18:


Patient will be treated with intravenous steroids utilizing Solu-Medrol and 

aggressive pulmonary toilet utilizing levalbuterol, ipratropium, budesonide and 

Mucomyst.  He will be followed with daily lab work including a CBC, BMP, 

magnesium level as well as a daily chest x-ray.





10/17/18:


Eleonora will be contined on his current therapy, with a decrease in his solu-

medrol dosage. CXR: show persistent bilateral pulmonary hyperexpansion/

hyperaeration. Daily labs and x-rays have been ordered.





10/18/18:


Patient is continued on his current asthma regimen.  Additional antibiotics 

will be added for treatment of his newly discovered left lower lobe pneumonia.  

Continue daily laboratory evaluations as noted above.








(3) Tachycardia with hypertension


Is this a current diagnosis for this admission?: Yes   


Plan: 


Problem worsened by attempted weaning off ventilator. Continues to be elevated 

@ 160/96 and 106. Beta blocakde will be initiated with metoprolol IV. Also prn 

dosage of IV morphine 2 mg q1hr will be available for use at nursing discretion 

for pain or discomfort that may cause this problem.





10/18/18:


Mild his blood pressure has been in better control since initiation of 

metoprolol IV therapy.  This will be continued and a goal heart rate of 50-60 

will hopefully be achieved with a concomitant reduction in his blood pressure.  

This will be monitored closely through the remainder of his hospital course.  

Patient is still able to receive morphine 2 mg IV every hour as needed pain or 

discomfort which may be indicated by a sudden rise in the patient's blood 

pressure or heart rate.








(4) Left lower lobe pneumonia


Qualifiers: 


   Pneumonia type: due to unspecified organism   Qualified Code(s): J18.1 - 

Lobar pneumonia, unspecified organism   


Is this a current diagnosis for this admission?: Yes   


Plan: 


Left lower lobe pneumonia was identified today on CTA of the chest.  Antibiotic 

therapy was continued utilizing Levaquin in conjunction with consultation by 

Dr. Samuel, and additional coverage will also be obtained by adding 

intravenous Invanz 1 g daily.  Daily laboratory evaluations obtained and follow-

up of other problems will be used in monitoring the course of his pneumonia.








- Time


Time Spent with patient: 35 or more minutes


Medications reviewed and adjusted accordingly: Yes


Anticipated discharge: Home

## 2018-10-18 NOTE — RADIOLOGY REPORT (SQ)
EXAM DESCRIPTION:  CHEST SINGLE VIEW



COMPLETED DATE/TIME:  10/18/2018 7:31 am



REASON FOR STUDY:  resp failure



COMPARISON:  Chest films 10/17/2018, 10/15/2018, 6/7/2018

CT chest 4/16/2018



EXAM PARAMETERS:  NUMBER OF VIEWS: One view.

TECHNIQUE: Single frontal radiographic view of the chest acquired.

RADIATION DOSE: NA

LIMITATIONS: None.



FINDINGS:  LUNGS AND PLEURA: There is collapse and consolidation in the left lower lobe worrisome for
 pneumonia.

Minimal left upper lobe perihilar airspace disease worrisome for early or developing pneumonia.

No left pleural effusion or pneumothorax.

Right lung well inflated and clear.

MEDIASTINUM AND HILAR STRUCTURES: No masses.  Contour normal.

HEART AND VASCULAR STRUCTURES: Heart normal in size.  Normal vasculature.

BONES: No acute findings.

HARDWARE: Endotracheal tube tip 7 cm above the nikunj.  Nasogastric tube tip and side port in the sto
mach.

OTHER: No other significant finding.



IMPRESSION:  Collapse and consolidation in the left lower lobe worrisome for pneumonia.

Endotracheal tube tip 7 cm above the nikunj, tube tip is at the level of the clavicular heads



TECHNICAL DOCUMENTATION:  JOB ID:  [id number]

 2011 [employer]- All Rights Reserved



Reading location - IP/workstation name: [email or web address]

## 2018-10-18 NOTE — RADIOLOGY REPORT (SQ)
EXAM DESCRIPTION:  CTA CHEST



COMPLETED DATE/TIME:  10/18/2018 10:40 am



REASON FOR STUDY:  sob



COMPARISON:  None.



TECHNIQUE:  CT scan of the chest performed using helical scanning technique with dynamic intravenous 
contrast injection.  Images reviewed with lung, soft tissue and bone windows.  Reconstructed coronal 
and sagittal MPR images reviewed.

Additional 3 dimensional post-processing performed to develop Maximal Intensity Projection images (MI
P).  All images stored on PACS.

All CT scanners at this facility use dose modulation, iterative reconstruction, and/or weight based d
osing when appropriate to reduce radiation dose to as low as reasonably achievable (ALARA).

[facility]: Dose Right  [facility]: CareDose    [facility]: Dose Right    [facility]: Teradose 4D    [facility]: Smart Technologies



CONTRAST TYPE AND DOSE:  contrast/concentration: Isovue 350.00 mg/ml; Total Contrast Delivered: 127.0
 ml; Total Saline Delivered: 186.0 ml

Contrast bolus optimized for the pulmonary arteries. Not diagnostic for the aorta.



RENAL FUNCTION:  BUN 21 creatinine 0.88



RADIATION DOSE:  CT Rad equipment meets quality standard of care and radiation dose reduction techniq
ues were employed. CTDIvol: 10.3 - 18.8 mGy. DLP: 854 mGy-cm. .



LIMITATIONS:  None.



FINDINGS:  LUNGS AND PLEURA: There is an endotracheal tube in appropriate position.  There is segment
al consolidation in the left lower lobe.  Subsegmental airspace disease right lower lobe.  More patch
y airspace disease left upper lobe.  No evidence of cavitation.  No significant pleural fluid accumul
ation.

AORTA AND GREAT VESSELS: No aneurysm.  Contrast bolus not optimized for the aorta.

HEART: No pericardial effusion. No significant coronary artery calcifications.

PULMONARY ARTERIES: No emboli visualized in the main pulmonary arteries or the segmental branches.

HILAR AND MEDIASTINAL STRUCTURES: No adenopathy.  Nasogastric tube in the stomach.

HARDWARE: None in the chest.

UPPER ABDOMEN: No significant findings.  Limited exam.

THYROID AND OTHER SOFT TISSUES: No masses.  No adenopathy.

BONES: No acute or significant finding.

3D MIPS: Confirm above findings.

OTHER: No other significant finding.



IMPRESSION:  No PE.  Bilateral pneumonia.



COMMENT:  Quality ID # 436: Final reports with documentation of one or more dose reduction techniques
 (e.g., Automated exposure control, adjustment of the mA and/or kV according to patient size, use of 
iterative reconstruction technique)



TECHNICAL DOCUMENTATION:  JOB ID:  [id number]

 2011 [employer]- All Rights Reserved



Reading location - IP/workstation name: [email or web address]

## 2018-10-18 NOTE — XCELERA REPORT
[address] [address]

                               Tel: [phone]

                               Fax: [phone]



                      Transthoracic Echocardiogram Report

_______________________________________________________________________________



Name: CHRISTY JEFFERSON

MRN: [id number]                           Age: 38 yrs

Gender: Male                              : 1980

Patient Status: Inpatient                 Patient Location: ICU^602^A

Account #: [id number]

Study Date: 10/18/2018 10:55 AM

Accession #: [id number]

_______________________________________________________________________________





_______________________________________________________________________________

Procedure: A two-dimensional transthoracic echocardiogram with color flow and

Doppler was performed. Study Quality: Fair. Note additional images obtained to

get apical 2 chamber views.

Reason For Study: Evaluate cardiac function



History: TACHYCARDIA / HYPERTENSION.

Ordering Physician: EDEN CUMMINGS

Performed By: Nicole Moe

_______________________________________________________________________________





Interpretation Summary

The left ventricle is normal in size.

There is normal left ventricular wall thickness.

LV EF is > than 60%

Left ventricular systolic function is normal.

Doppler measurements suggest normal left ventricular diastolic function

The left ventricular wall motion is normal.

There is no thrombus.

The right ventricle is grossly normal size.

The right atrium is normal.

The left atrial size is normal.

The interatrial septum is intact with no evidence for an atrial septal defect.

There is no evidence of mitral valve prolapse.

There is no vegetation seen on the mitral valve.

There is no mitral valve stenosis.

There is no mitral regurgitation noted.

The aortic valve is trileaflet.

The aortic valve opens well.

There is no aortic valvular vegetation.

There is no aortic valve stenosis

There is no LVOT obstruction.

No aortic regurgitation is present.

There is no tricuspid stenosis.

There is a trace amount of tricuspid regurgitation

Unable to calculate RVSP due lack of TR jet.

There is no pulmonic valvular stenosis.

There is no pulmonic valvular regurgitation.

The aortic root is normal size.

There is no pericardial effusion.



MMode/2D Measurements & Calculations

RVDd: 3.5 cm   LVIDd: 5.7 cm   FS: 37.7 %             Ao root diam: 3.2 cm

IVSd: 0.66 cm  LVIDs: 3.6 cm   EDV(Teich): 160.9 ml   Ao root area: 8.0 cm2

               LVPWd: 0.74 cm  ESV(Teich): 52.9 ml

                               EF(Teich): 67.1 %



Doppler Measurements & Calculations

MV E max dinora:       MV dec slope:         Ao V2 max:        LV V1 max P.0 cm/sec                               120.2 cm/sec      3.3 mmHg

MV A max dinora:       471.0 cm/sec2         Ao max PG:        LV V1 max:

49.9 cm/sec         MV dec time: 0.16 sec 5.8 mmHg          91.0 cm/sec

MV E/A: 1.5



        _______________________________________________________________

PA V2 max:

107.4 cm/sec

PA max P.6 mmHg



Left Ventricle

The left ventricle is normal in size. There is normal left ventricular wall

thickness. LV EF is > than 60%. Left ventricular systolic function is normal.

Doppler measurements suggest normal left ventricular diastolic function. The

left ventricular wall motion is normal. There is no thrombus. There is no

ventricular septal defect visualized.



Right Ventricle

The right ventricle is grossly normal size.





Atria

The right atrium is normal. The left atrial size is normal. The interatrial

septum is intact with no evidence for an atrial septal defect.



Mitral Valve

There is no evidence of mitral valve prolapse. There is no vegetation seen on

the mitral valve. There is no mitral valve stenosis. There is no mitral

regurgitation noted.



Aortic Valve

The aortic valve is trileaflet. The aortic valve opens well. There is no

aortic valvular vegetation. There is no aortic valve stenosis. There is no

LVOT obstruction. No aortic regurgitation is present.



Tricuspid Valve

There is no tricuspid stenosis. There is a trace amount of tricuspid

regurgitation. Unable to calculate RVSP due lack of TR jet.





Pulmonic Valve

There is no pulmonic valvular stenosis. There is no pulmonic valvular

regurgitation.



Great Vessels

The aortic root is normal size.



Effusions

There is no pericardial effusion.





_______________________________________________________________________________

_______________________________________________________________________________

Electronically signed by:      Caitlin Ascencio      on 10/18/2018 02:53 PM



CC: EDEN CUMMINGS

>

Caitlin Ascencio

## 2018-10-18 NOTE — RADIOLOGY REPORT (SQ)
EXAM DESCRIPTION:  CHEST SINGLE VIEW



COMPLETED DATE/TIME:  10/18/2018 3:25 pm



REASON FOR STUDY:  resp. failure



COMPARISON:  Earlier the same day.



EXAM PARAMETERS:  NUMBER OF VIEWS: One view.

TECHNIQUE: Single frontal radiographic view of the chest acquired.

RADIATION DOSE: NA

LIMITATIONS: None.



FINDINGS:  LUNGS AND PLEURA: Left lower lobe airspace disease not significantly changed.  No pneumoth
orax.

MEDIASTINUM AND HILAR STRUCTURES: No masses.  Contour normal.

HEART AND VASCULAR STRUCTURES: Heart normal in size.  Normal vasculature.

BONES: No acute findings.

HARDWARE: None in the chest.

OTHER: Endotracheal tube tip between thoracic inlet and nikunj.  Nasogastric tube extends into the le
ft lower quadrant.



IMPRESSION:  Good position of support apparatus.  No pneumothorax.



TECHNICAL DOCUMENTATION:  JOB ID:  [id number]

 2011 [employer]- All Rights Reserved



Reading location - IP/workstation name: [facility][email or web address]

## 2018-10-19 LAB
ANION GAP SERPL CALC-SCNC: 9 MMOL/L (ref 5–19)
ARTERIAL BLOOD FIO2: (no result)
ARTERIAL BLOOD FIO2: (no result)
ARTERIAL BLOOD H2CO3: 1.29 MMOL/L (ref 1.05–1.35)
ARTERIAL BLOOD H2CO3: 1.33 MMOL/L (ref 1.05–1.35)
ARTERIAL BLOOD HCO3: 28.7 MMOL/L (ref 20–24)
ARTERIAL BLOOD HCO3: 29.7 MMOL/L (ref 20–24)
ARTERIAL BLOOD PCO2: 42.9 MMHG (ref 35–45)
ARTERIAL BLOOD PCO2: 44.2 MMHG (ref 35–45)
ARTERIAL BLOOD PH: 7.43 (ref 7.35–7.45)
ARTERIAL BLOOD PH: 7.46 (ref 7.35–7.45)
ARTERIAL BLOOD PO2: 60.6 MMHG (ref 80–100)
ARTERIAL BLOOD PO2: 79.7 MMHG (ref 80–100)
ARTERIAL BLOOD TOTAL CO2: 30 MMOL/L (ref 23–27)
ARTERIAL BLOOD TOTAL CO2: 31 MMOL/L (ref 23–27)
BASE EXCESS BLDA CALC-SCNC: 3.8 MMOL/L
BASE EXCESS BLDA CALC-SCNC: 5.2 MMOL/L
BUN SERPL-MCNC: 22 MG/DL (ref 7–20)
CALCIUM: 8.7 MG/DL (ref 8.4–10.2)
CHLORIDE SERPL-SCNC: 104 MMOL/L (ref 98–107)
CO2 SERPL-SCNC: 29 MMOL/L (ref 22–30)
ERYTHROCYTE [DISTWIDTH] IN BLOOD BY AUTOMATED COUNT: 16.2 % (ref 11.5–14)
GLUCOSE SERPL-MCNC: 112 MG/DL (ref 75–110)
HCT VFR BLD CALC: 41.5 % (ref 37.9–51)
HGB BLD-MCNC: 13.5 G/DL (ref 13.5–17)
MCH RBC QN AUTO: 26.4 PG (ref 27–33.4)
MCHC RBC AUTO-ENTMCNC: 32.5 G/DL (ref 32–36)
MCV RBC AUTO: 81 FL (ref 80–97)
PLATELET # BLD: 269 10^3/UL (ref 150–450)
POTASSIUM SERPL-SCNC: 5 MMOL/L (ref 3.6–5)
RBC # BLD AUTO: 5.11 10^6/UL (ref 4.35–5.55)
SAO2 % BLDA: 92.4 % (ref 94–98)
SAO2 % BLDA: 96 % (ref 94–98)
SODIUM SERPL-SCNC: 142 MMOL/L (ref 137–145)
TRIGL SERPL-MCNC: 168 MG/DL (ref ?–150)
WBC # BLD AUTO: 11.8 10^3/UL (ref 4–10.5)

## 2018-10-19 PROCEDURE — 0B9K30Z DRAINAGE OF RIGHT LUNG WITH DRAINAGE DEVICE, PERCUTANEOUS APPROACH: ICD-10-PCS | Performed by: SURGERY

## 2018-10-19 RX ADMIN — MORPHINE SULFATE PRN MG: 10 INJECTION INTRAMUSCULAR; INTRAVENOUS; SUBCUTANEOUS at 21:41

## 2018-10-19 RX ADMIN — Medication SCH ML: at 13:59

## 2018-10-19 RX ADMIN — IPRATROPIUM BROMIDE SCH MG: 0.5 SOLUTION RESPIRATORY (INHALATION) at 07:56

## 2018-10-19 RX ADMIN — METOCLOPRAMIDE SCH MLS/HR: 5 INJECTION, SOLUTION INTRAMUSCULAR; INTRAVENOUS at 17:31

## 2018-10-19 RX ADMIN — PROPOFOL PRN MLS/HR: 10 INJECTION, EMULSION INTRAVENOUS at 04:12

## 2018-10-19 RX ADMIN — ACETAMINOPHEN PRN MG: 325 TABLET ORAL at 11:19

## 2018-10-19 RX ADMIN — ACETYLCYSTEINE SCH MG: 200 INHALANT RESPIRATORY (INHALATION) at 20:59

## 2018-10-19 RX ADMIN — MIDAZOLAM HYDROCHLORIDE PRN MLS/HR: 5 INJECTION, SOLUTION INTRAMUSCULAR; INTRAVENOUS at 17:31

## 2018-10-19 RX ADMIN — MIDAZOLAM HYDROCHLORIDE PRN MLS/HR: 5 INJECTION, SOLUTION INTRAMUSCULAR; INTRAVENOUS at 01:28

## 2018-10-19 RX ADMIN — METOPROLOL TARTRATE SCH MG: 5 INJECTION, SOLUTION INTRAVENOUS at 20:11

## 2018-10-19 RX ADMIN — Medication SCH ML: at 07:33

## 2018-10-19 RX ADMIN — METOPROLOL TARTRATE SCH MG: 5 INJECTION, SOLUTION INTRAVENOUS at 11:11

## 2018-10-19 RX ADMIN — MONTELUKAST SODIUM SCH MG: 10 TABLET, FILM COATED ORAL at 22:29

## 2018-10-19 RX ADMIN — PANTOPRAZOLE SODIUM SCH MG: 40 INJECTION, POWDER, FOR SOLUTION INTRAVENOUS at 11:11

## 2018-10-19 RX ADMIN — MIDAZOLAM HYDROCHLORIDE PRN MLS/HR: 5 INJECTION, SOLUTION INTRAMUSCULAR; INTRAVENOUS at 22:29

## 2018-10-19 RX ADMIN — PROPOFOL PRN MLS/HR: 10 INJECTION, EMULSION INTRAVENOUS at 11:10

## 2018-10-19 RX ADMIN — DEXTROSE, SODIUM CHLORIDE, AND POTASSIUM CHLORIDE PRN MLS/HR: 5; .45; .15 INJECTION INTRAVENOUS at 11:37

## 2018-10-19 RX ADMIN — LEVALBUTEROL HYDROCHLORIDE SCH MG: 1.25 SOLUTION RESPIRATORY (INHALATION) at 15:37

## 2018-10-19 RX ADMIN — METOPROLOL TARTRATE SCH MG: 5 INJECTION, SOLUTION INTRAVENOUS at 16:01

## 2018-10-19 RX ADMIN — PROPOFOL PRN MLS/HR: 10 INJECTION, EMULSION INTRAVENOUS at 16:02

## 2018-10-19 RX ADMIN — LEVALBUTEROL HYDROCHLORIDE SCH MG: 1.25 SOLUTION RESPIRATORY (INHALATION) at 23:51

## 2018-10-19 RX ADMIN — LEVOFLOXACIN SCH MLS/HR: 750 INJECTION, SOLUTION INTRAVENOUS at 22:30

## 2018-10-19 RX ADMIN — HYDRALAZINE HYDROCHLORIDE SCH MG: 20 INJECTION INTRAMUSCULAR; INTRAVENOUS at 20:10

## 2018-10-19 RX ADMIN — ERTAPENEM SODIUM SCH MLS/HR: 1 INJECTION, POWDER, LYOPHILIZED, FOR SOLUTION INTRAMUSCULAR; INTRAVENOUS at 16:01

## 2018-10-19 RX ADMIN — BUDESONIDE SCH MG: 0.5 SUSPENSION RESPIRATORY (INHALATION) at 21:00

## 2018-10-19 RX ADMIN — HYDRALAZINE HYDROCHLORIDE SCH MG: 20 INJECTION INTRAMUSCULAR; INTRAVENOUS at 16:01

## 2018-10-19 RX ADMIN — METOPROLOL TARTRATE SCH MG: 5 INJECTION, SOLUTION INTRAVENOUS at 04:09

## 2018-10-19 RX ADMIN — MIDAZOLAM HYDROCHLORIDE PRN MLS/HR: 5 INJECTION, SOLUTION INTRAMUSCULAR; INTRAVENOUS at 05:49

## 2018-10-19 RX ADMIN — HEPARIN SODIUM SCH UNIT: 5000 INJECTION, SOLUTION INTRAVENOUS; SUBCUTANEOUS at 05:48

## 2018-10-19 RX ADMIN — DEXTROSE, SODIUM CHLORIDE, AND POTASSIUM CHLORIDE PRN MLS/HR: 5; .45; .15 INJECTION INTRAVENOUS at 20:06

## 2018-10-19 RX ADMIN — HEPARIN SODIUM SCH UNIT: 5000 INJECTION, SOLUTION INTRAVENOUS; SUBCUTANEOUS at 13:59

## 2018-10-19 RX ADMIN — ACETAMINOPHEN PRN MG: 325 TABLET ORAL at 21:13

## 2018-10-19 RX ADMIN — IPRATROPIUM BROMIDE SCH MG: 0.5 SOLUTION RESPIRATORY (INHALATION) at 00:00

## 2018-10-19 RX ADMIN — METHYLPREDNISOLONE SODIUM SUCCINATE SCH MG: 125 INJECTION, POWDER, FOR SOLUTION INTRAMUSCULAR; INTRAVENOUS at 22:29

## 2018-10-19 RX ADMIN — PROPOFOL PRN MLS/HR: 10 INJECTION, EMULSION INTRAVENOUS at 21:13

## 2018-10-19 RX ADMIN — METOPROLOL TARTRATE SCH MG: 5 INJECTION, SOLUTION INTRAVENOUS at 07:33

## 2018-10-19 RX ADMIN — PROPOFOL PRN MLS/HR: 10 INJECTION, EMULSION INTRAVENOUS at 07:34

## 2018-10-19 RX ADMIN — PROPOFOL PRN MLS/HR: 10 INJECTION, EMULSION INTRAVENOUS at 01:28

## 2018-10-19 RX ADMIN — METHYLPREDNISOLONE SODIUM SUCCINATE SCH MG: 125 INJECTION, POWDER, FOR SOLUTION INTRAMUSCULAR; INTRAVENOUS at 13:59

## 2018-10-19 RX ADMIN — IPRATROPIUM BROMIDE SCH MG: 0.5 SOLUTION RESPIRATORY (INHALATION) at 23:51

## 2018-10-19 RX ADMIN — Medication SCH: at 22:31

## 2018-10-19 RX ADMIN — LEVALBUTEROL HYDROCHLORIDE SCH MG: 1.25 SOLUTION RESPIRATORY (INHALATION) at 00:00

## 2018-10-19 RX ADMIN — MIDAZOLAM HYDROCHLORIDE PRN MLS/HR: 5 INJECTION, SOLUTION INTRAMUSCULAR; INTRAVENOUS at 11:11

## 2018-10-19 RX ADMIN — LEVALBUTEROL HYDROCHLORIDE SCH MG: 1.25 SOLUTION RESPIRATORY (INHALATION) at 07:56

## 2018-10-19 RX ADMIN — HEPARIN SODIUM SCH UNIT: 5000 INJECTION, SOLUTION INTRAVENOUS; SUBCUTANEOUS at 22:29

## 2018-10-19 RX ADMIN — BUDESONIDE SCH MG: 0.5 SUSPENSION RESPIRATORY (INHALATION) at 07:56

## 2018-10-19 RX ADMIN — IPRATROPIUM BROMIDE SCH MG: 0.5 SOLUTION RESPIRATORY (INHALATION) at 15:37

## 2018-10-19 RX ADMIN — ACETYLCYSTEINE SCH MG: 200 INHALANT RESPIRATORY (INHALATION) at 07:56

## 2018-10-19 RX ADMIN — ALBUTEROL SULFATE PRN MG: 2.5 SOLUTION RESPIRATORY (INHALATION) at 21:00

## 2018-10-19 RX ADMIN — PROPOFOL PRN MLS/HR: 10 INJECTION, EMULSION INTRAVENOUS at 18:35

## 2018-10-19 RX ADMIN — METHYLPREDNISOLONE SODIUM SUCCINATE SCH MG: 125 INJECTION, POWDER, FOR SOLUTION INTRAMUSCULAR; INTRAVENOUS at 05:48

## 2018-10-19 RX ADMIN — MORPHINE SULFATE PRN MG: 10 INJECTION INTRAMUSCULAR; INTRAVENOUS; SUBCUTANEOUS at 18:20

## 2018-10-19 NOTE — OPERATIVE REPORT E
Operative Report



NAME: CHRISTY JEFFERSON

MRN:  [id number]          : 1980 AGE:  38Y

DATE OF SURGERY: 10/19/2018                        ROOM: 602



PREOPERATIVE DIAGNOSES:

1.   RIGHT LOCULATED PNEUMOTHORAX.

2.   RIGHT SUBCUTANEOUS EMPHYSEMA NECK AND CHEST.



POSTOPERATIVE DIAGNOSES:

1.   RIGHT LOCULATED PNEUMOTHORAX.

2.   RIGHT SUBCUTANEOUS EMPHYSEMA NECK AND CHEST.



OPERATION:

Placement of right 22-Macanese thoracostomy tube.



SURGEON:

MASON FONTENOT M.D.



ASSISTANT:

None.



ESTIMATED BLOOD LOSS:

Minimal, less than 5 mL.



COMPLICATIONS:

None.



ANESTHESIA:

About 20 mL of 1% lidocaine.



INDICATIONS/FINDINGS:

A 38-year-old male with severe asthma, intubated.  Developed a

pneumothorax on the right side, apparently loculated, preceded by the

occurrence of subcutaneous emphysema of the right side of the chest and

neck.  Consent was obtained from the family to place a right chest tube. 

Procedure, risks, benefits, and complications explained to the family. 

They understand and decided to proceed.



PROCEDURE:

The procedure was done at the bedside in the ICU.  The patient was placed

in a semi-Rahman position and the right side of the  chest was

prepped and draped in a standard fashion.  The area just anterior to the

intersection of the nipple line and mid axillary line, was infiltrated with

lidocaine.  Incision was made with a #15 blade.  The subcutaneous tissue

was divided with a hemostat.  The intercostal space was pierced with a

large hemostat and the chest cavity was entered with a gush of air. 

Following this, a finger was inserted with opening of the intercostal

space.  A 32-Macanese chest tube was inserted through the intercostal space

opening and advanced up to about 8 cm into the chest cavity, with good

return of air,  air bubbling into the water seal cavity of the Pleur-Evac as 
well as

moisture in the chest tube.  The chest tube was secured to the skin with

2-0 silk suture, Vaseline gauze and sterile dressing.  Tape was then

applied to secure the chest tube in position.  The patient tolerated the

procedure well.  A chest x-ray was obtained to confirm position of the

chest tube.



DICTATING PHYSICIAN:  MASON FONTENOT M.D.





[id number]                  DT: 10/19/2018    2110

PHY#: [id number]            DD: 10/19/2018    2053

ID:   [id number]           JOB#: [id number]       ACCT: [id number]



cc:MASON FONTENOT M.D.

>







[facility]

## 2018-10-19 NOTE — RADIOLOGY REPORT (SQ)
EXAM DESCRIPTION:  CHEST SINGLE VIEW



COMPLETED DATE/TIME:  10/19/2018 5:45 pm



REASON FOR STUDY:  Subcutaneous Emphysema



COMPARISON:  10/19/2018



EXAM PARAMETERS:  NUMBER OF VIEWS: One view.

TECHNIQUE: Single frontal radiographic view of the chest acquired.

RADIATION DOSE: NA

LIMITATIONS: None.



FINDINGS:  LUNGS AND PLEURA: Patchy airspace disease in the left base.  Marked lucency in the right b
ase, cannot exclude loculated pneumothorax.  .

MEDIASTINUM AND HILAR STRUCTURES: No masses.  Contour normal.

HEART AND VASCULAR STRUCTURES: Heart normal in size.  Normal vasculature.

BONES: No acute findings.

HARDWARE: None in the chest.

OTHER: Extensive subcutaneous emphysema in the upper chest and neck.



IMPRESSION:  1.  Left lower lobe pneumonia.

2.  Cannot exclude loculated pneumothorax the right base.

3.  Extensive subcutaneous emphysema.



TECHNICAL DOCUMENTATION:  JOB ID:  [id number]

 2011 [employer]- All Rights Reserved



Reading location - IP/workstation name: [email or web address]

## 2018-10-19 NOTE — PDOC CONSULTATION
Consultation


Consult Date: 10/19/18


Consult reason:: right pneumothorax





History of Present Illness


Admission Date/PCP: 


  10/15/18 22:20





  





History of Present Illness: 


CHRISTY JEFFERSON is a 38 year old male with asthma, intubated;  he developed a 

right chest and neck pneumothorax this afternoon.  A chest mX-ray was obtained 

and it showed a right loculated pneumothorax along with a right sided neck and 

chest wall subcutaneous emphysema.  No evidence of tension pneumothorax was 

identified.   I have been consulted to place a right chest tube.








Past Medical History


Pulmonary Medical History: Reports: Asthma, Bronchitis


   Denies: Chronic Obstructive Pulmonary Disease (COPD), Intubation, Pneumonia, 

Respiratory Failure, Sleep Apnea, Tuberculosis


Neurological Medical History: 


   Denies: Migraine, Seizures


Endocrine Medical History: 


   Denies: Diabetes Mellitus Type 1, Diabetes Mellitus Type 2, Hyperthyroidism, 

Hypothyroidism


Musculoskeltal Medical History: Reports: Other - Rib fracture of 3-7. Clavicle 

fracture.


Psychiatric Medical History: Reports: Bipolar Disorder, Substance Abuse


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





Past Surgical History


Past Surgical History: Reports: Orthopedic Surgery - R clavical; tibia





Social History


Smoking Status: Current Some Day Smoker


Frequency of Alcohol Use: Occasional


Hx Recreational Drug Use: Yes


Drugs: Cocaine, Marijuana


Hx Prescription Drug Abuse: No





- Advance Directive


Resuscitation Status: Full Code





Family History


Family History: Hypertension, Other - Unobtainable


Parental Family History Reviewed: Yes - HTN


Children Family History Reviewed: No


Sibling(s) Family History Reviewed.: No





Medication/Allergy


Home Medications: 








Albuterol Sulfate [Ventolin Hfa] 2 puff IH Q6HP PRN 10/16/18 


Cetirizine HCl [Zyrtec] 10 mg PO DAILY 10/16/18 


Fluticasone/Salmeterol [Advair 250-50 Diskus 28 dose] 1 puff IH Q12 10/16/18 


Ipratropium/Albuterol Sulfate [Combivent Respimat Inhal Spray] 1 puff IH QID 10/

16/18 


Montelukast Sodium [Singulair 10 mg Tablet] 10 mg PO QHS 10/16/18 


Tiotropium Bromide [Spiriva Respimat] 2 puff IH DAILY 10/16/18 








Allergies/Adverse Reactions: 


 





tramadol Allergy (Verified 04/16/18 13:08)


 











Physical Exam


Vital Signs: 


 











Temp Pulse Resp BP Pulse Ox


 


 100.4 F   91   30 H  145/110 H  95 


 


 10/19/18 19:37  10/19/18 15:37  10/19/18 19:37  10/19/18 19:37  10/19/18 19:37








 Intake & Output











 10/18/18 10/19/18 10/20/18





 06:59 06:59 06:59


 


Intake Total 2183.2 2158.2 3778.2


 


Output Total 2685 4650 2805


 


Balance -501.8 -2491.8 973.2


 


Weight 74 kg 72.5 kg 











General appearance: PRESENT: other - sedated, not responsive


Respiratory exam: PRESENT: other - large subcutaneous emphysema of the anterior 

and right lateral neck and upper chest





Results


Laboratory Results: 


 





 10/19/18 03:55 





 10/19/18 03:55 





 











  10/19/18 10/19/18 10/19/18





  03:55 03:55 05:40


 


WBC  11.8 H  


 


RBC  5.11  


 


Hgb  13.5  


 


Hct  41.5  


 


MCV  81  


 


MCH  26.4 L  


 


MCHC  32.5  


 


RDW  16.2 H  


 


Plt Count  269  


 


Carbonic Acid    1.33


 


HCO3/H2CO3 Ratio    21:1


 


ABG pH    7.43


 


ABG pCO2    44.2


 


ABG pO2    79.7 L


 


ABG HCO3    28.7 H


 


ABG O2 Saturation    96.0


 


ABG Base Excess    3.8


 


FiO2    60%


 


Sodium   142.0 


 


Potassium   5.0 


 


Chloride   104 


 


Carbon Dioxide   29 


 


Anion Gap   9 


 


BUN   22 H 


 


Creatinine   0.84 


 


Est GFR ( Amer)   > 60 


 


Est GFR (Non-Af Amer)   > 60 


 


Glucose   112 H 


 


Calcium   8.7 


 


Magnesium   2.6 H 


 


Triglycerides   168 H 














  10/19/18





  17:40


 


WBC 


 


RBC 


 


Hgb 


 


Hct 


 


MCV 


 


MCH 


 


MCHC 


 


RDW 


 


Plt Count 


 


Carbonic Acid  1.29


 


HCO3/H2CO3 Ratio  23:1


 


ABG pH  7.46 H


 


ABG pCO2  42.9


 


ABG pO2  60.6 L


 


ABG HCO3  29.7 H


 


ABG O2 Saturation  92.4 L


 


ABG Base Excess  5.2


 


FiO2  60%


 


Sodium 


 


Potassium 


 


Chloride 


 


Carbon Dioxide 


 


Anion Gap 


 


BUN 


 


Creatinine 


 


Est GFR ( Amer) 


 


Est GFR (Non-Af Amer) 


 


Glucose 


 


Calcium 


 


Magnesium 


 


Triglycerides 








 











  10/16/18 10/16/18 10/16/18





  04:12 05:32 05:32


 


Creatine Kinase   304 H 


 


CK-MB (CK-2)    3.56


 


Troponin I    < 0.012


 


NT-Pro-B Natriuret Pep  191 H  














  10/16/18 10/16/18 10/16/18





  11:19 11:19 17:25


 


Creatine Kinase  272 H   239 H


 


CK-MB (CK-2)   3.86 


 


Troponin I   < 0.012 


 


NT-Pro-B Natriuret Pep   














  10/16/18





  17:25


 


Creatine Kinase 


 


CK-MB (CK-2)  3.28


 


Troponin I  < 0.012


 


NT-Pro-B Natriuret Pep 











Impressions: 


 





Chest/Abdomen CTA  10/18/18 09:15


IMPRESSION:  No PE.  Bilateral pneumonia.


 








Chest X-Ray  10/19/18 17:30


IMPRESSION:  1.  Left lower lobe pneumonia.


2.  Cannot exclude loculated pneumothorax the right base.


3.  Extensive subcutaneous emphysema.


 














Assessment & Plan





- Plan Summary


Plan Summary: 





A/





Intubated patient with severe asthma


Right pneumoithorax, non-tension


upper chest and neck subcutaneous emphysema











P/








Placement of right  32 F thoracostomy tube under local anesthesia


Procedure, risks, benefits explained to the patient's family, their questions 

were answered, and they decided to proceed.

## 2018-10-19 NOTE — RADIOLOGY REPORT (SQ)
Portable chest 10/19/2018 at 9:01 PM.



Compared to 10/19/2018 at 5:39 PM.



HISTORY: Shortness of breath.



FINDINGS: Endotracheal tube and enteric tube are in appropriate

placement. Moderate upper chest and neck subcutaneous emphysema,

as seen on the prior study. No significant pneumothorax is noted.



IMPRESSION:



Stable findings. No significant pneumothorax identified.

## 2018-10-19 NOTE — PDOC PROGRESS REPORT
Subjective


Progress Note for:: 10/19/18


Subjective:: 


ELEONORA CASTRO is a 38 year old male who presented to the emergency room with 

a 3-day history of progressively worsening dyspnea despite taking 60 mg of 

prednisone that he had at home for use when he experiences an exacerbation of 

his asthma.  Mr. Castro has a past medical history of asthma, bronchitis, 

tobacco and polysubstance abuse.  In the emergency room he was found to have 

acute respiratory decompensation and was rapidly intubated.  He was 

subsequently started on Solu-Medrol, magnesium, albuterol by the emergency room 

provider.  His ABG revealed a PCO2 of 70 and as he was maintained on the 

ventilator and transferred to the ICU for care by the hospitalist service.





10/16/18:


Patient is unable to provide subjective input since he is sedated and on a 

ventilator.  His examination reveals mild end expiratory wheezing and a 

minimally prolongated expiratory phase.  He will be maintained on the 

ventilator for the rest the day at the direction of Dr. Samuel who is seeing 

the patient in consultation.  Hopefully Mr. Castro can be extubated tomorrow.





10/17/18:


Eleonora did not tolerate an attempt to wean him from assisted ventilatory 

support today.  His blood gases were good with a normal pH, PO2 and PCO2.  

However once patient's ventilatory assistance was reduced he became very 

tachypneic and tachycardic and his ventilator support had to be reinstated to 

its previous level.  Additional adjustments were made by Dr. Samuel and his 

blood gases will be rechecked in another attempt at withdrawing ventilatory 

support will be made again tomorrow if appropriate.  Therefore Mr. Castro is 

unable to participate in providing any subjective information as he is sedated 

and intubated.





10/18/18:


Eleonora his again been unable to tolerate weaning from ventilatory assistance.  

His blood gases today showed a drop in his PO2 and thus a CTA of the chest was 

performed which was negative for pulmonary embolus.  I have personally reviewed 

these films and have observed a left posterior medial basilar lobar/segmental 

pneumonia to be present.  Antibiotic therapy will be initiated in consultation 

with Dr. Samuel.  Eleonora continues to be sedated to the point where he is 

unable to participate in providing any subjective input.





10/19/18:


The patient is again unable to tolerate weaning from ventilatory assistance.  

His blood gases were excellent with a pH of 7.43 a PCO2 of 44 and a PO2 of 80 

but this required 60% oxygen to obtain that result.  She has is for the patient 

to tolerate ventilatory withdrawal at that level are very poor.  Patient was 

noted to have subcutaneous emphysema in his bilateral anterior neck (plane of 

the platysmus muscles) today.  On exam his wheezing is substantially reduced 

and his expiratory phase is substantially reduced.  Again he is sedated and 

unable to provide subjective input into his care.


Reason For Visit: 


ASTHMA EXACERBATION,ACUTE RESPIRATORY FAILURE,








Physical Exam


Vital Signs: 


 











Temp Pulse Resp BP Pulse Ox


 


 100.0 F   92   24 H  153/112 H  97 


 


 10/19/18 13:00  10/19/18 12:00  10/19/18 13:00  10/19/18 12:37  10/19/18 13:00








 Intake & Output











 10/17/18 10/18/18 10/19/18





 23:59 23:59 23:59


 


Intake Total 3062.2 2112.2 2789.2


 


Output Total 2885 4250 2410


 


Balance 177.2 -2137.8 379.2


 


Weight 75.1 kg 74 kg 72.5 kg











General appearance: PRESENT: other - Sedated for endotracheal intubation and 

ventilation.


Head exam: PRESENT: atraumatic, normocephalic


Eye exam: ABSENT: conjunctival injection, periorbital swelling, scleral icterus


Mouth exam: PRESENT: neck supple, other - Endotracheal tube is in good position


Neck exam: ABSENT: thyromegaly, tracheal deviation


Respiratory exam: PRESENT: clear to auscultation moise, symmetrical, other - On 

mechanical ventilation


Cardiovascular exam: PRESENT: RRR.  ABSENT: clicks, gallop, rubs


Vascular exam: PRESENT: normal capillary refill.  ABSENT: pallor


GI/Abdominal exam: PRESENT: normal bowel sounds, soft


Rectal exam: PRESENT: deferred


Extremities exam: ABSENT: joint swelling, pedal edema


Musculoskeletal exam: PRESENT: normal inspection.  ABSENT: ambulatory, deformity

, dislocation


Neurological exam: PRESENT: reflexes normal, other - Sedated for ventilation


Psychiatric exam: PRESENT: other - Sedated for ventilation with no further 

evaluation


Skin exam: ABSENT: jaundice, rash, urticaria





Results


Laboratory Results: 


 





 10/19/18 03:55 





 10/19/18 03:55 





 











  10/19/18 10/19/18 10/19/18





  03:55 03:55 05:40


 


WBC  11.8 H  


 


RBC  5.11  


 


Hgb  13.5  


 


Hct  41.5  


 


MCV  81  


 


MCH  26.4 L  


 


MCHC  32.5  


 


RDW  16.2 H  


 


Plt Count  269  


 


Carbonic Acid    1.33


 


HCO3/H2CO3 Ratio    21:1


 


ABG pH    7.43


 


ABG pCO2    44.2


 


ABG pO2    79.7 L


 


ABG HCO3    28.7 H


 


ABG O2 Saturation    96.0


 


ABG Base Excess    3.8


 


FiO2    60%


 


Sodium   142.0 


 


Potassium   5.0 


 


Chloride   104 


 


Carbon Dioxide   29 


 


Anion Gap   9 


 


BUN   22 H 


 


Creatinine   0.84 


 


Est GFR ( Amer)   > 60 


 


Est GFR (Non-Af Amer)   > 60 


 


Glucose   112 H 


 


Calcium   8.7 


 


Magnesium   2.6 H 


 


Triglycerides   168 H 








 





10/16/18 11:35   Tracheal Aspirate   Gram Stain - Final


10/16/18 11:35   Tracheal Aspirate   Sputum Culture - Final


                            NORMAL ADRIANA





 











  10/16/18 10/16/18 10/16/18





  04:12 05:32 05:32


 


Creatine Kinase   304 H 


 


CK-MB (CK-2)    3.56


 


Troponin I    < 0.012


 


NT-Pro-B Natriuret Pep  191 H  














  10/16/18 10/16/18 10/16/18





  11:19 11:19 17:25


 


Creatine Kinase  272 H   239 H


 


CK-MB (CK-2)   3.86 


 


Troponin I   < 0.012 


 


NT-Pro-B Natriuret Pep   














  10/16/18





  17:25


 


Creatine Kinase 


 


CK-MB (CK-2)  3.28


 


Troponin I  < 0.012


 


NT-Pro-B Natriuret Pep 











Impressions: 


 





Chest/Abdomen CTA  10/18/18 09:15


IMPRESSION:  No PE.  Bilateral pneumonia.


 








Chest X-Ray  10/19/18 06:00


IMPRESSION:


 


1. Persistent airspace disease in the left lower lobe concerning


for a pneumonic infiltrate.


2. Chronic volume loss in the right lower lobe.


3. New subcutaneous emphysema projecting over the lower neck and


superior mediastinum consistent with probable pneumomediastinum.


4. Life support lines and tubes present as described above.


 














Assessment & Plan





- Diagnosis


(1) Acute respiratory failure with hypoxia and hypercapnia


Is this a current diagnosis for this admission?: Yes   


Plan: 


10/16/18: 


Patient is admitted to the ICU and treated with mechanical ventilation via 

endotracheal tube.  Dr. Samuel is consulting and he and I have discussed the 

case extensively today.  Patient's chest x-ray has been reviewed personally by 

myself.  Additionally I have ordered daily lab work of a venous blood gases and 

a daily chest x-ray while the patient is on a ventilator.





10/17/18:


Mr. Castro continues to be on mechanical ventilation.  Dr. Samuel and I have 

discussed the case today.  Patient's chest x-ray has been reviewed personally 

by myself.  Additionally I have ordered daily lab work of CBC, BMP, Mg, VBG's 

and a daily chest x-ray while the patient is on a ventilator.





10/18/18:


Mr. Castro continues to require mechanical ventilation having failed efforts at 

withdrawing ventilatory assistance again today.  His chest x-ray is unchanged 

but a CTA of his chest revealed a left lower lobe posterior medial basilar 

segmental/lobar pneumonia.  This is felt to be most likely responsible for his 

increased hypoxemia on today's blood gas evaluation.  We will continue daily 

lab work as described above with daily chest x-rays.  He will be started on 

additional IV antibiotic therapy in conjunction with consultation with Dr. Samuel.





10/19/18:


Mr. Castro is continued on the ventilator today.  I reviewed his chest x-ray 

which is essentially unchanged from yesterday's film although the left lower 

lobe pneumonia in the retrocardiac space is more apparent today.  Additionally 

he is noted to have subcutaneous emphysema in the cervical region.  There is 

otherwise continued hyperexpansion/hyper aeration of the bilateral lung fields.

  His blood gases are normal on 60% oxygen but this is not a favorable FiO2 to 

initiate weaning.  His ventilator settings have been adjusted by Dr. Samuel in 

response to his findings today with hopes of obtaining a more favorable setting 

for ventilator respiratory support withdrawal in the near future.  Continue 

daily chest x-rays and ABGs.








(2) Left lower lobe pneumonia


Qualifiers: 


   Pneumonia type: due to unspecified organism   Qualified Code(s): J18.1 - 

Lobar pneumonia, unspecified organism   


Is this a current diagnosis for this admission?: Yes   


Plan: 


Left lower lobe pneumonia was identified today on CTA of the chest.  Antibiotic 

therapy was continued utilizing Levaquin in conjunction with consultation by 

Dr. Samuel, and additional coverage will also be obtained by adding 

intravenous Invanz 1 g daily.  Daily laboratory evaluations obtained and follow-

up of other problems will be used in monitoring the course of his pneumonia.  





10/19/18:


Patient's white blood count has again fallen and shows a good response to 

antibiotic therapy.  Additionally patient's exam shows improvement in the 

patient's clinical respiratory status with reduction in his wheezing and 

improvement of his chest air motion.  His treatment will be continued with 

current medications and daily laboratory evaluation of his CBC, BMP and 

magnesium levels will be obtained.








(3) Acute severe exacerbation of asthma


Is this a current diagnosis for this admission?: Yes   


Plan: 


10/16/18:


Patient will be treated with intravenous steroids utilizing Solu-Medrol and 

aggressive pulmonary toilet utilizing levalbuterol, ipratropium, budesonide and 

Mucomyst.  He will be followed with daily lab work including a CBC, BMP, 

magnesium level as well as a daily chest x-ray.





10/17/18:


Eleonora will be contined on his current therapy, with a decrease in his solu-

medrol dosage. CXR: show persistent bilateral pulmonary hyperexpansion/

hyperaeration. Daily labs and x-rays have been ordered.





10/18/18:


Patient is continued on his current asthma regimen.  Additional antibiotics 

will be added for treatment of his newly discovered left lower lobe pneumonia.  

Continue daily laboratory evaluations as noted above.





10/19/18:


Mr. Castro's current regimen will be continued as per the recommendations of 

Dr. Samuel.








(4) Tachycardia with hypertension


Is this a current diagnosis for this admission?: Yes   


Plan: 


Problem worsened by attempted weaning off ventilator. Continues to be elevated 

@ 160/96 and 106. Beta blocakde will be initiated with metoprolol IV. Also prn 

dosage of IV morphine 2 mg q1hr will be available for use at nursing discretion 

for pain or discomfort that may cause this problem.





10/18/18:


Nolvia blood pressure has been in better control since initiation of 

metoprolol IV therapy.  This will be continued and a goal heart rate of 50-60 

will hopefully be achieved with a concomitant reduction in his blood pressure.  

This will be monitored closely through the remainder of his hospital course.  

Patient is still able to receive morphine 2 mg IV every hour as needed pain or 

discomfort which may be indicated by a sudden rise in the patient's blood 

pressure or heart rate.





10/19/18:


Mr. Simmons blood pressure continues to be reasonably well-controlled 

utilizing intravenous metoprolol every 4 hours.  His systolic pressure is 

consistently in the 140-160 range which is permissible though not at desired 

goal of 120-140 for a ventilator patient of this age.  His diastolic pressure 

however is consistently greater than 100 which is not permissible.  Therefore 

he will have hydralazine 20 mg IV every 4 hours initiated as antihypertensive 

therapy.  The hydralazine may contribute to tachycardia or an increase in heart 

rate however the benefit of diastolic pressure reduction is considerably 

greater than the potential for a low-grade tachycardia.  If tachycardia is too 

substantial additional medications or changes to his regiment will need to be 

made.








- Time


Time Spent with patient: 35 or more minutes


Medications reviewed and adjusted accordingly: Yes


Anticipated discharge: Home

## 2018-10-19 NOTE — PROGRESS NOTE
Provider Note


Provider Note: 


Critical care note:





17:29 Start critical care.


Mr. Castro was noted by nursing staff to have a sudden increase in his heart 

rate and to also become suddenly more restless while intubated.  Additionally 

they noted rapid expansion of subcutaneous emphysema in his neck.  They were 

concerned and asked me to look at the patient to which I complied.





On my evaluation patient was found to be significantly tachycardic with a heart 

rate of 125 and he was restless stirring in the bed despite his propofol and 

midazolam drips.  On auscultation the decrease in the breath sounds at the 

right base was noted anteriorly to the posterior axillary line.  Examination of 

the neck revealed significant bilateral anterior cervical subcutaneous 

emphysema which was significantly increased from the degree of subcutaneous 

emphysema that was present at the time of my evaluation much earlier in the 

day.  A chest x-ray and ABGs were ordered.





ABGs revealed a 10 point drop in his PO2 from this morning's gases.  The chest x

-ray revealed a right basilar pneumothorax.





With these findings a surgical consultation with Dr. Nam Thornton was 

obtained for placement of a surgical thoracostomy tube and under waterseal to 

evacuate the pneumothorax and maintain right lung inflation.  Attempts to 

notify Dr. Samuel of the changes in the patient's status were unsuccessful.





Dr. Thornton requested that the patient's medical decision-maker or power of 

 be contacted to obtain a surgical permit for placement of a chest tube 

in the right chest.


19:05 End critical care.

## 2018-10-19 NOTE — RADIOLOGY REPORT (SQ)
EXAM DESCRIPTION: X-ray single view chest.



CLINICAL HISTORY: 38 years Male, resp failure/pna



COMPARISON: Prior portable chest performed on 10/18/2018.



TECHNIQUE: Single portable view of the chest performed on

10/19/2018 at 6:02 AM



FINDINGS: 

The lungs are hyperinflated. There is stable airspace disease in

the left lower lobe. There is chronic volume loss in the right

lower lobe. There is no definite pneumothorax.



The cardiac silhouette is normal in size and configuration.



The mediastinal contours are normal.



No acute osseous abnormality is identified. There is a chronic

comminuted right clavicular fracture.



There is new pneumomediastinum and subcutaneous emphysema

projecting over the lower neck and upper chest.



Lines and tubes:  The tip of the endotracheal tube terminates

just below the clavicular heads. The feeding tube extends below

the diaphragm and projects over the left upper quadrant.



IMPRESSION:



1. Persistent airspace disease in the left lower lobe concerning

for a pneumonic infiltrate.

2. Chronic volume loss in the right lower lobe.

3. New subcutaneous emphysema projecting over the lower neck and

superior mediastinum consistent with probable pneumomediastinum.

4. Life support lines and tubes present as described above.

## 2018-10-20 VITALS — DIASTOLIC BLOOD PRESSURE: 80 MMHG | SYSTOLIC BLOOD PRESSURE: 143 MMHG

## 2018-10-20 LAB
ABSOLUTE LYMPHOCYTES# (MANUAL): 0.2 10^3/UL (ref 0.5–4.7)
ABSOLUTE MONOCYTES # (MANUAL): 0.6 10^3/UL (ref 0.1–1.4)
ABSOLUTE NEUTROPHILS# (MANUAL): 11.3 10^3/UL (ref 1.7–8.2)
ADD MANUAL DIFF: YES
ANION GAP SERPL CALC-SCNC: 12 MMOL/L (ref 5–19)
ANISOCYTOSIS BLD QL SMEAR: (no result)
ARTERIAL BLOOD FIO2: (no result)
ARTERIAL BLOOD FIO2: (no result)
ARTERIAL BLOOD H2CO3: 1.14 MMOL/L (ref 1.05–1.35)
ARTERIAL BLOOD H2CO3: 1.15 MMOL/L (ref 1.05–1.35)
ARTERIAL BLOOD HCO3: 27.2 MMOL/L (ref 20–24)
ARTERIAL BLOOD HCO3: 27.4 MMOL/L (ref 20–24)
ARTERIAL BLOOD PCO2: 38 MMHG (ref 35–45)
ARTERIAL BLOOD PCO2: 38.1 MMHG (ref 35–45)
ARTERIAL BLOOD PH: 7.47 (ref 7.35–7.45)
ARTERIAL BLOOD PH: 7.48 (ref 7.35–7.45)
ARTERIAL BLOOD PO2: 79.5 MMHG (ref 80–100)
ARTERIAL BLOOD PO2: 96.7 MMHG (ref 80–100)
ARTERIAL BLOOD TOTAL CO2: 28.4 MMOL/L (ref 23–27)
ARTERIAL BLOOD TOTAL CO2: 28.6 MMOL/L (ref 23–27)
BASE EXCESS BLDA CALC-SCNC: 3.6 MMOL/L
BASE EXCESS BLDA CALC-SCNC: 3.8 MMOL/L
BASO STIPL BLD QL SMEAR: PRESENT
BASOPHILS NFR BLD MANUAL: 0 % (ref 0–2)
BUN SERPL-MCNC: 26 MG/DL (ref 7–20)
CALCIUM: 9.2 MG/DL (ref 8.4–10.2)
CHLORIDE SERPL-SCNC: 104 MMOL/L (ref 98–107)
CO2 SERPL-SCNC: 23 MMOL/L (ref 22–30)
EOSINOPHIL NFR BLD MANUAL: 0 % (ref 0–6)
ERYTHROCYTE [DISTWIDTH] IN BLOOD BY AUTOMATED COUNT: 15.8 % (ref 11.5–14)
GLUCOSE SERPL-MCNC: 121 MG/DL (ref 75–110)
HCT VFR BLD CALC: 41 % (ref 37.9–51)
HGB BLD-MCNC: 13.6 G/DL (ref 13.5–17)
MCH RBC QN AUTO: 26.5 PG (ref 27–33.4)
MCHC RBC AUTO-ENTMCNC: 33.1 G/DL (ref 32–36)
MCV RBC AUTO: 80 FL (ref 80–97)
MONOCYTES % (MANUAL): 5 % (ref 3–13)
OVALOCYTES BLD QL SMEAR: (no result)
PLATELET # BLD: 298 10^3/UL (ref 150–450)
PLATELET COMMENT: ADEQUATE
POIKILOCYTOSIS BLD QL SMEAR: (no result)
POLYCHROMASIA BLD QL SMEAR: SLIGHT
POTASSIUM SERPL-SCNC: 4.8 MMOL/L (ref 3.6–5)
RBC # BLD AUTO: 5.12 10^6/UL (ref 4.35–5.55)
SAO2 % BLDA: 96.4 % (ref 94–98)
SAO2 % BLDA: 97.8 % (ref 94–98)
SEGMENTED NEUTROPHILS % (MAN): 93 % (ref 42–78)
SODIUM SERPL-SCNC: 139.4 MMOL/L (ref 137–145)
TARGETS BLD QL SMEAR: (no result)
TOTAL CELLS COUNTED BLD: 100
VARIANT LYMPHS NFR BLD MANUAL: 2 % (ref 13–45)
WBC # BLD AUTO: 12.1 10^3/UL (ref 4–10.5)
WBC TOXIC VACUOLES BLD QL SMEAR: PRESENT

## 2018-10-20 RX ADMIN — PROPOFOL PRN MLS/HR: 10 INJECTION, EMULSION INTRAVENOUS at 00:47

## 2018-10-20 RX ADMIN — MIDAZOLAM HYDROCHLORIDE PRN MLS/HR: 5 INJECTION, SOLUTION INTRAMUSCULAR; INTRAVENOUS at 17:31

## 2018-10-20 RX ADMIN — LEVALBUTEROL HYDROCHLORIDE SCH MG: 1.25 SOLUTION RESPIRATORY (INHALATION) at 16:12

## 2018-10-20 RX ADMIN — HYDRALAZINE HYDROCHLORIDE SCH MG: 20 INJECTION INTRAMUSCULAR; INTRAVENOUS at 00:44

## 2018-10-20 RX ADMIN — HEPARIN SODIUM SCH UNIT: 5000 INJECTION, SOLUTION INTRAVENOUS; SUBCUTANEOUS at 05:58

## 2018-10-20 RX ADMIN — ACETYLCYSTEINE SCH MG: 200 INHALANT RESPIRATORY (INHALATION) at 08:20

## 2018-10-20 RX ADMIN — METOPROLOL TARTRATE SCH MG: 5 INJECTION, SOLUTION INTRAVENOUS at 16:32

## 2018-10-20 RX ADMIN — IPRATROPIUM BROMIDE SCH MG: 0.5 SOLUTION RESPIRATORY (INHALATION) at 08:20

## 2018-10-20 RX ADMIN — MIDAZOLAM HYDROCHLORIDE PRN MLS/HR: 5 INJECTION, SOLUTION INTRAMUSCULAR; INTRAVENOUS at 03:17

## 2018-10-20 RX ADMIN — HEPARIN SODIUM SCH UNIT: 5000 INJECTION, SOLUTION INTRAVENOUS; SUBCUTANEOUS at 13:51

## 2018-10-20 RX ADMIN — METHYLPREDNISOLONE SODIUM SUCCINATE SCH MG: 125 INJECTION, POWDER, FOR SOLUTION INTRAMUSCULAR; INTRAVENOUS at 13:51

## 2018-10-20 RX ADMIN — PANTOPRAZOLE SODIUM SCH MG: 40 INJECTION, POWDER, FOR SOLUTION INTRAVENOUS at 09:35

## 2018-10-20 RX ADMIN — PROPOFOL PRN MLS/HR: 10 INJECTION, EMULSION INTRAVENOUS at 09:35

## 2018-10-20 RX ADMIN — Medication SCH ML: at 13:51

## 2018-10-20 RX ADMIN — PROPOFOL PRN MLS/HR: 10 INJECTION, EMULSION INTRAVENOUS at 05:54

## 2018-10-20 RX ADMIN — METOPROLOL TARTRATE SCH MG: 5 INJECTION, SOLUTION INTRAVENOUS at 08:12

## 2018-10-20 RX ADMIN — DEXTROSE, SODIUM CHLORIDE, AND POTASSIUM CHLORIDE PRN MLS/HR: 5; .45; .15 INJECTION INTRAVENOUS at 17:32

## 2018-10-20 RX ADMIN — METHYLPREDNISOLONE SODIUM SUCCINATE SCH MG: 125 INJECTION, POWDER, FOR SOLUTION INTRAMUSCULAR; INTRAVENOUS at 05:57

## 2018-10-20 RX ADMIN — ERTAPENEM SODIUM SCH MLS/HR: 1 INJECTION, POWDER, LYOPHILIZED, FOR SOLUTION INTRAMUSCULAR; INTRAVENOUS at 16:32

## 2018-10-20 RX ADMIN — MORPHINE SULFATE PRN MG: 10 INJECTION INTRAMUSCULAR; INTRAVENOUS; SUBCUTANEOUS at 05:58

## 2018-10-20 RX ADMIN — HYDRALAZINE HYDROCHLORIDE SCH MG: 20 INJECTION INTRAMUSCULAR; INTRAVENOUS at 05:56

## 2018-10-20 RX ADMIN — Medication SCH ML: at 05:59

## 2018-10-20 RX ADMIN — METOPROLOL TARTRATE SCH MG: 5 INJECTION, SOLUTION INTRAVENOUS at 00:44

## 2018-10-20 RX ADMIN — IPRATROPIUM BROMIDE SCH MG: 0.5 SOLUTION RESPIRATORY (INHALATION) at 16:12

## 2018-10-20 RX ADMIN — METOPROLOL TARTRATE SCH MG: 5 INJECTION, SOLUTION INTRAVENOUS at 11:35

## 2018-10-20 RX ADMIN — METOPROLOL TARTRATE SCH MG: 5 INJECTION, SOLUTION INTRAVENOUS at 05:56

## 2018-10-20 RX ADMIN — MIDAZOLAM HYDROCHLORIDE PRN MLS/HR: 5 INJECTION, SOLUTION INTRAMUSCULAR; INTRAVENOUS at 11:35

## 2018-10-20 RX ADMIN — PROPOFOL PRN MLS/HR: 10 INJECTION, EMULSION INTRAVENOUS at 16:32

## 2018-10-20 RX ADMIN — HYDRALAZINE HYDROCHLORIDE SCH MG: 20 INJECTION INTRAMUSCULAR; INTRAVENOUS at 11:39

## 2018-10-20 RX ADMIN — HYDRALAZINE HYDROCHLORIDE SCH MG: 20 INJECTION INTRAMUSCULAR; INTRAVENOUS at 08:12

## 2018-10-20 RX ADMIN — PROPOFOL PRN MLS/HR: 10 INJECTION, EMULSION INTRAVENOUS at 02:30

## 2018-10-20 RX ADMIN — MORPHINE SULFATE PRN MG: 10 INJECTION INTRAMUSCULAR; INTRAVENOUS; SUBCUTANEOUS at 01:35

## 2018-10-20 RX ADMIN — PROPOFOL PRN MLS/HR: 10 INJECTION, EMULSION INTRAVENOUS at 13:51

## 2018-10-20 RX ADMIN — MORPHINE SULFATE PRN MG: 10 INJECTION INTRAMUSCULAR; INTRAVENOUS; SUBCUTANEOUS at 17:59

## 2018-10-20 RX ADMIN — MORPHINE SULFATE PRN MG: 10 INJECTION INTRAMUSCULAR; INTRAVENOUS; SUBCUTANEOUS at 13:24

## 2018-10-20 RX ADMIN — METOCLOPRAMIDE SCH MLS/HR: 5 INJECTION, SOLUTION INTRAMUSCULAR; INTRAVENOUS at 17:32

## 2018-10-20 RX ADMIN — HYDRALAZINE HYDROCHLORIDE SCH MG: 20 INJECTION INTRAMUSCULAR; INTRAVENOUS at 16:32

## 2018-10-20 RX ADMIN — LEVALBUTEROL HYDROCHLORIDE SCH MG: 1.25 SOLUTION RESPIRATORY (INHALATION) at 08:20

## 2018-10-20 RX ADMIN — BUDESONIDE SCH MG: 0.5 SUSPENSION RESPIRATORY (INHALATION) at 08:20

## 2018-10-20 NOTE — RADIOLOGY REPORT (SQ)
EXAM DESCRIPTION:  CT CHEST WITHOUT



COMPLETED DATE/TIME:  10/20/2018 12:12 pm



REASON FOR STUDY:  PNEUMOTHORAX



COMPARISON:  CT chest 10/18/2018, 4/16/2018, 12/16/2009



TECHNIQUE:  CT scan performed of the chest without intravenous contrast.  Images reviewed with lung, 
soft tissue and bone windows.  Reconstructed coronal and sagittal MPR images reviewed.  All images st
ored on PACS.

All CT scanners at this facility use dose modulation, iterative reconstruction, and/or weight based d
osing when appropriate to reduce radiation dose to as low as reasonably achievable (ALARA).

[facility]: Dose Right  [facility]: CareDose    [facility]: Dose Right    [facility]: Teradose 4D    [facility]: Smart Technologies



RADIATION DOSE:  CT Rad equipment meets quality standard of care and radiation dose reduction techniq
ues were employed. CTDIvol: 5.8 mGy. DLP: 255 mGy-cm. mGy.



LIMITATIONS:  No technical limitations.



FINDINGS:  LUNGS AND PLEURA: A right-sided chest tube is present, with the tube coursing through the 
right upper lobe.  The tip of the tube abuts the right hilum, between the right upper lobe pulmonary 
artery and superior vena cava.  No mediastinal hematoma.  This finding was discussed with Dr. Thornton
, 1215 hours, 10/20/2018.

There is complete collapse of the right lower lobe.

Patchy airspace disease is present throughout the left lower lobe worrisome for pneumonia.

Trace right apical and basilar pneumothorax is present.

No right or left pleural effusion

HILAR AND MEDIASTINAL STRUCTURES: Extensive mediastinal air is present, tracking into the neck soft t
issues, bilateral supraclavicular regions, and anterior and posterior chest wall.

HEART AND VASCULAR STRUCTURES: No aneurysm.  No pericardial effusion.

UPPER ABDOMEN: No significant findings.  Limited exam.

THYROID AND OTHER SOFT TISSUES: Extensive chest wall air.  Thyroid unremarkable.

BONES: Multiple old healed right upper rib fractures.  Nonunited right clavicle fracture

HARDWARE: Right chest tube as above.  Nasogastric tube tip and side port in the stomach

OTHER: No other significant findings.



IMPRESSION:  Right-sided chest tube courses through the right upper lobe parenchyma with tip between 
the SVC and right upper lobe pulmonary artery.

Complete collapse right lower lobe

Left lower lobe pneumonia

Trace right apical and basilar pneumothorax.  Extensive chest wall air.

Results discussed with Dr. Thornton.



TECHNICAL DOCUMENTATION:  JOB ID:  [id number]

Quality ID # [id number]: Final reports with documentation of one or more dose reduction techniques (e.g., Au
tomated exposure control, adjustment of the mA and/or kV according to patient size, use of iterative 
reconstruction technique)

 2011 [employer]- All Rights Reserved



Reading location - IP/workstation name: [email or web address]

## 2018-10-20 NOTE — PDOC TRANSFER SUMMARY
General


Admission Date/PCP: 


  10/15/18 22:20





  





Admission Date: 10/15/18


Transfer Date: 10/20/18


Accepting Facility: [facility]


Resuscitation Status: Full Code





- Transfer Diagnosis


(1) Acute respiratory failure with hypoxia and hypercapnia


Is this a current diagnosis for this admission?: Yes   


Diagnosis Summary: 


10/16/18: 


Patient is admitted to the ICU and treated with mechanical ventilation via 

endotracheal tube.  Dr. Samuel is consulting and he and I have discussed the 

case extensively today.  Patient's chest x-ray has been reviewed personally by 

myself.  Additionally I have ordered daily lab work of a venous blood gases and 

a daily chest x-ray while the patient is on a ventilator.





10/17/18:


Mr. Castro continues to be on mechanical ventilation.  Dr. Samuel and I have 

discussed the case today.  Patient's chest x-ray has been reviewed personally 

by myself.  Additionally I have ordered daily lab work of CBC, BMP, Mg, VBG's 

and a daily chest x-ray while the patient is on a ventilator.





10/18/18:


Mr. Castro continues to require mechanical ventilation having failed efforts at 

withdrawing ventilatory assistance again today.  His chest x-ray is unchanged 

but a CTA of his chest revealed a left lower lobe posterior medial basilar 

segmental/lobar pneumonia.  This is felt to be most likely responsible for his 

increased hypoxemia on today's blood gas evaluation.  We will continue daily 

lab work as described above with daily chest x-rays.  He will be started on 

additional IV antibiotic therapy in conjunction with consultation with Dr. Samuel.





10/19/18:


Mr. Castro is continued on the ventilator today.  I reviewed his chest x-ray 

which is essentially unchanged from yesterday's film although the left lower 

lobe pneumonia in the retrocardiac space is more apparent today.  Additionally 

he is noted to have subcutaneous emphysema in the cervical region.  There is 

otherwise continued hyperexpansion/hyper aeration of the bilateral lung fields.

  His blood gases are normal on 60% oxygen but this is not a favorable FiO2 to 

initiate weaning.  His ventilator settings have been adjusted by Dr. Samuel in 

response to his findings today with hopes of obtaining a more favorable setting 

for ventilator respiratory support withdrawal in the near future.  Continue 

daily chest x-rays and ABGs.





10/20/18:


Patient remains on the ventilator for respiratory support.  I reviewed his 

chest film today and it shows a persistence of the loculated right lateral 

lower lobe pneumothorax as well as the persistence of the left lower lobe 

pneumonia in the retrocardiac area and the generalized hyperaeration/

hyperinflation/hyperexpansion of the bilateral lungs..  His subcutaneous 

emphysema in the bilateral anterior cervical region is increased.  His blood 

gases this morning showed a pH of 7.48 with a PCO2 of 38 and a PO2 of 97 on 80% 

FiO2.  CT scan of his chest showed a chest tube embedded in the parenchyma of 

the right upper lobe, a complete collapse of the right lower lobe and the left 

lower lobe pneumonia was again identified.  With these findings it was felt 

that it was best that the patient be transferred to a higher level of care as 

he will no doubt require pulmonology services and probable thoracic surgical 

services within the next 24-48 hours.








(2) Left lower lobe pneumonia


Is this a current diagnosis for this admission?: Yes   


Diagnosis Summary: 


10/18/18:


Left lower lobe pneumonia was identified today on CTA of the chest.  Antibiotic 

therapy was continued utilizing Levaquin in conjunction with consultation by 

Dr. Samuel, and additional coverage will also be obtained by adding 

intravenous Invanz 1 g daily.  Daily laboratory evaluations obtained and follow-

up of other problems will be used in monitoring the course of his pneumonia.  





10/19/18:


Patient's white blood count has again fallen and shows a good response to 

antibiotic therapy.  Additionally patient's exam shows improvement in the 

patient's clinical respiratory status with reduction in his wheezing and 

improvement of his chest air motion.  His treatment will be continued with 

current medications and daily laboratory evaluation of his CBC, BMP and 

magnesium levels will be obtained.





10/20/18:


Mr. Castro will be transferred to [facility] for higher level of care due to other 

problems noted elsewhere in this document.








(3) Acute severe exacerbation of asthma


Is this a current diagnosis for this admission?: Yes   


Diagnosis Summary: 


10/16/18:


Patient will be treated with intravenous steroids utilizing Solu-Medrol and 

aggressive pulmonary toilet utilizing levalbuterol, ipratropium, budesonide and 

Mucomyst.  He will be followed with daily lab work including a CBC, BMP, 

magnesium level as well as a daily chest x-ray.





10/17/18:


Eleonora will be contined on his current therapy, with a decrease in his solu-

medrol dosage. CXR: show persistent bilateral pulmonary hyperexpansion/

hyperaeration. Daily labs and x-rays have been ordered.





10/18/18:


Patient is continued on his current asthma regimen.  Additional antibiotics 

will be added for treatment of his newly discovered left lower lobe pneumonia.  

Continue daily laboratory evaluations as noted above.





10/19/18:


Mr. Castro's current regimen will be continued as per the recommendations of 

Dr. Samuel.





10/20/18:


Mr. Castro will be transferred to [facility] for higher level of care due to other 

problems noted elsewhere in this document.








(4) Tachycardia with hypertension


Is this a current diagnosis for this admission?: Yes   


Diagnosis Summary: 


10/17/18:


Problem worsened by attempted weaning off ventilator. Continues to be elevated 

@ 160/96 and 106. Beta blocakde will be initiated with metoprolol IV. Also prn 

dosage of IV morphine 2 mg q1hr will be available for use at nursing discretion 

for pain or discomfort that may cause this problem.





10/18/18:


Eleonora's blood pressure has been in better control since initiation of 

metoprolol IV therapy.  This will be continued and a goal heart rate of 50-60 

will hopefully be achieved with a concomitant reduction in his blood pressure.  

This will be monitored closely through the remainder of his hospital course.  

Patient is still able to receive morphine 2 mg IV every hour as needed pain or 

discomfort which may be indicated by a sudden rise in the patient's blood 

pressure or heart rate.





10/19/18:


Mr. Simmons blood pressure continues to be reasonably well-controlled 

utilizing intravenous metoprolol every 4 hours.  His systolic pressure is 

consistently in the 140-160 range which is permissible though not at desired 

goal of 120-140 for a ventilator patient of this age.  His diastolic pressure 

however is consistently greater than 100 which is not permissible.  Therefore 

he will have hydralazine 20 mg IV every 4 hours initiated as antihypertensive 

therapy.  The hydralazine may contribute to tachycardia or an increase in heart 

rate however the benefit of diastolic pressure reduction is considerably 

greater than the potential for a low-grade tachycardia.  If tachycardia is too 

substantial additional medications or changes to his regiment will need to be 

made.





10/20/18:


Mr. Castro will be transferred to [facility] for higher level of care due to other 

problems noted elsewhere in this document.








(5) Pneumothorax on right


Is this a current diagnosis for this admission?: Yes   


Diagnosis Summary: 


He was initially found with a loculated lower lung pneumothorax on the right 

about 5:00 PM last evening when he had a sudden change in his status.  At that 

time he developed tachycardia, hypertension and severe restlessness.  His 

sudden change resulted in the performance of a chest x-ray and arterial blood 

gases which demonstrated a 10 point decrease in his PO2 from his morning gases 

and a right lateral lower lung loculated pneumothorax.  A chest x-ray was 

inserted by the general surgeon Dr. Nam Thornton and the patient was 

continued on ventilatory support.  After after the chest x-ray was reported 

this morning a CT of the chest was done to evaluate the chest tube and to 

identify the area of the pneumothorax more clearly.  The CT indicated that the 

chest tube was located within the parenchyma of the right upper lobe and the 

right lower lobe was completely collapsed.  He is being transferred to [facility] 

primarily for management of this problem and the underlying change or injury 

that resulted in the pneumothorax.








- Transfer Medications


Home Medications: 


 





Albuterol Sulfate [Ventolin Hfa] 2 puff IH Q6HP PRN 10/16/18 


Cetirizine HCl [Zyrtec] 10 mg PO DAILY 10/16/18 


Fluticasone/Salmeterol [Advair 250-50 Diskus 28 dose] 1 puff IH Q12 10/16/18 


Ipratropium/Albuterol Sulfate [Combivent Respimat Inhal Spray] 1 puff IH QID 10/

16/18 


Montelukast Sodium [Singulair 10 mg Tablet] 10 mg PO QHS 10/16/18 


Tiotropium Bromide [Spiriva Respimat] 2 puff IH DAILY 10/16/18 








Transfer Medications: 


 Current Medications





Acetaminophen (Tylenol 325 Mg Tablet)  650 mg NG Q6HP PRN


   PRN Reason: FOR PAIN OR TEMP


   Stop: 11/14/18 22:00


   Last Admin: 10/19/18 21:13 Dose:  650 mg


Acetylcysteine (Mucomist 20% Soln 800 Mg/4 Ml)  600 mg NEB RTBID LAURO


   Stop: 11/17/18 09:59


   Last Admin: 10/20/18 08:20 Dose:  600 mg


Albuterol (Ventolin 0.083% Neb 2.5 Mg/3 Ml Ampul)  2.5 mg NEB RTQ1HP PRN


   PRN Reason: SHORTNESS OF BREATH


   Stop: 11/15/18 12:06


   Last Admin: 10/19/18 21:00 Dose:  2.5 mg


Budesonide (Pulmicort Neb 0.5 Mg/2 Ml Ampul)  0.5 mg NEB RTQ12 [facility]


   Stop: 11/15/18 10:59


   Last Admin: 10/20/18 08:20 Dose:  0.5 mg


Heparin Sodium (Porcine) (Heparin Inj 5,000 Units/Ml 1 Ml Syringe)  5,000 unit 

SUBCUT Q8 [facility]


   Stop: 11/15/18 05:59


   Last Admin: 10/20/18 13:51 Dose:  5,000 unit


Hydralazine HCl (Apresoline Inj/Pf 20 Mg/1 Ml Sdv)  20 mg IV Q4A [facility]


   Stop: 11/18/18 15:59


   Last Admin: 10/20/18 11:39 Dose:  20 mg


Levofloxacin/Dextrose (Levaquin Rtu 750 Mg/D5w 150 Ml Premix)  750 mg in 150 

mls @ 100 mls/hr IV QHS [facility]


   Stop: 10/23/18 21:59


   Last Infusion: 10/20/18 00:00 Dose:  Infused


Propofol (Diprivan Rtu 1000 Mg/100 Ml Inf.Bottle)  1,000 mg in 100 mls @ 0 mls/

hr IV CONTINUOUS PRN; Protocol; Titrate


   PRN Reason: THIS MED IS NOT "PRN"


   Stop: 11/14/18 22:04


   Last Admin: 10/20/18 13:51 Dose:  60 mcg/kg/min, 26.1 mls/hr


Midazolam HCl (Versed Rtu 50 Mg/100 Ml Premix Bag)  50 mg in 100 mls @ 0 mls/hr 

IV CONTINUOUS PRN; Protocol; Titrate


   PRN Reason: THIS MED IS NOT "PRN"


   Stop: 10/23/18 09:31


   Last Titration: 10/20/18 12:41 Dose:  10,000 mcg/hr, 20 mls/hr


Thiamine HCl 100 mg/Multivitamins/Minerals 10 ml/Folic Acid 1 mg/ Sodium 

Chloride  1,011.2 mls @ 50 mls/hr IV QPM [facility]


   Stop: 11/15/18 17:59


   Last Admin: 10/19/18 17:31 Dose:  50 mls/hr


Potassium Chloride/Dextrose/Sod Cl (D5-1/2ns 1000 Ml/Kcl 20 Meq Premix Bag)  1,

000 mls @ 125 mls/hr IV CONTINUOUS PRN


   PRN Reason: THIS MED IS NOT "PRN"


   Stop: 11/15/18 12:03


   Last Infusion: 10/20/18 06:00 Dose:  50 mls/hr


Ertapenem 1 gm/ Sodium (Chloride)  50 mls @ 100 mls/hr IV DAILY@1600 [facility]


   Stop: 10/25/18 15:59


   Last Infusion: 10/19/18 17:31 Dose:  Infused


Influenza Virus Vaccine Quadrival (Fluarix Adlt Quad 2018-19 Vac 0.5 Ml Syr)  

0.5 ml IM .DISCHARGE PRN


   PRN Reason: THIS MED IS NOT "PRN"


   Stop: 11/16/18 16:44


Ipratropium Bromide (Atrovent 0.02% Neb 0.5 Mg/2.5 Ml Ampul)  0.5 mg NEB RTQ8 

[facility]


   Stop: 11/15/18 15:59


   Last Admin: 10/20/18 08:20 Dose:  0.5 mg


Levalbuterol HCl (Xopenex Neb 1.25 Mg/3 Ml Ampul)  1.25 mg NEB RTQ8 [facility]


   Stop: 11/15/18 15:59


   Last Admin: 10/20/18 08:20 Dose:  1.25 mg


Methylprednisolone Sodium Succinate (Solu-Medrol Inj/Pf 125 Mg/2 Ml Sdv)  60 mg 

IV Q8 [facility]


   Stop: 11/15/18 13:59


   Last Admin: 10/20/18 13:51 Dose:  60 mg


Metoprolol Tartrate (Lopressor Inj/Pf 5 Mg/5 Ml Sdv)  5 mg IV Q4A [facility]


   Stop: 11/16/18 15:59


   Last Admin: 10/20/18 11:35 Dose:  5 mg


Montelukast Sodium (Singulair 10 Mg Tablet)  10 mg NG QHS [facility]


   Stop: 11/15/18 21:59


   Last Admin: 10/19/18 22:29 Dose:  10 mg


Morphine Sulfate (Morphine 10 Mg/Ml Inj)  2 mg IV Q1HP PRN


   PRN Reason: FOR PAIN


   Stop: 10/24/18 15:11


   Last Admin: 10/20/18 13:24 Dose:  2 mg


Pantoprazole Sodium (Protonix Iv Inj 40 Mg Vial)  40 mg IV DAILY [facility]


   Stop: 10/22/18 09:59


   Last Admin: 10/20/18 09:35 Dose:  40 mg


Sodium Chloride (Saline Flush 2.5 Ml Monoject Prefil Syrin)  2.5 ml IV Q8 [facility]


   Stop: 11/15/18 05:59


   Last Admin: 10/20/18 13:51 Dose:  2.5 ml











- Allergies


Allergies/Adverse Reactions: 


 





tramadol Allergy (Verified 04/16/18 13:08)


 











- Diet/Activity


Discharge Diet: Other (Comments) - N.p.o. on ventilatory assistance with 

endotracheal tube in position.


Discharge Activity: Bedrest





Hospital Course


Hospital Course: 


ELEONORA CASTRO is a 38 year old male who presented to the emergency room with 

a 3-day history of progressively worsening dyspnea despite taking 60 mg of 

prednisone that he had at home for use when he experiences an exacerbation of 

his asthma.  Mr. Castro has a past medical history of asthma, bronchitis, 

tobacco and polysubstance abuse.  In the emergency room he was found to have 

acute respiratory decompensation and was rapidly intubated.  He was 

subsequently started on Solu-Medrol, magnesium, albuterol by the emergency room 

provider.  His ABG revealed a PCO2 of 70 and as he was maintained on the 

ventilator and transferred to the ICU for care by the hospitalist service.





10/16/18:


Patient is unable to provide subjective input since he is sedated and on a 

ventilator.  His examination reveals mild end expiratory wheezing and a 

minimally prolongated expiratory phase.  He will be maintained on the 

ventilator for the rest the day at the direction of Dr. Samuel who is seeing 

the patient in consultation.  Hopefully Mr. Castro can be extubated tomorrow.





10/17/18:


Eleonora did not tolerate an attempt to wean him from assisted ventilatory 

support today.  His blood gases were good with a normal pH, PO2 and PCO2.  

However once patient's ventilatory assistance was reduced he became very 

tachypneic and tachycardic and his ventilator support had to be reinstated to 

its previous level.  Additional adjustments were made by Dr. Samuel and his 

blood gases will be rechecked in another attempt at withdrawing ventilatory 

support will be made again tomorrow if appropriate.  Therefore Mr. Castro is 

unable to participate in providing any subjective information as he is sedated 

and intubated.





10/18/18:


Eleonora his again been unable to tolerate weaning from ventilatory assistance.  

His blood gases today showed a drop in his PO2 and thus a CTA of the chest was 

performed which was negative for pulmonary embolus.  I have personally reviewed 

these films and have observed a left posterior medial basilar lobar/segmental 

pneumonia to be present.  Antibiotic therapy will be initiated in consultation 

with Dr. Samuel.  Eleonora continues to be sedated to the point where he is 

unable to participate in providing any subjective input.





10/19/18:


The patient is again unable to tolerate weaning from ventilatory assistance.  

His blood gases were excellent with a pH of 7.43 a PCO2 of 44 and a PO2 of 80 

but this required 60% oxygen to obtain that result.  She has is for the patient 

to tolerate ventilatory withdrawal at that level are very poor.  Patient was 

noted to have subcutaneous emphysema in his bilateral anterior neck (plane 

anterior to the platysmus muscles) today.  On exam his wheezing is 

substantially reduced and his expiratory phase is substantially reduced.  Again 

he is sedated and unable to provide subjective input into his care.





10/20/18:


The patient was noted this morning to have a persistent right lateral lower 

lung loculated pneumothorax on his daily chest x-ray.  His subcutaneous 

emphysema is significantly more prominent in the bilateral anterior neck.  He 

was initially found with a loculated lower lung pneumothorax on the right about 

5:00 PM last evening when he had a sudden change in his status.  At that time 

he developed tachycardia, hypertension and severe restlessness.  His sudden 

change resulted in the performance of a chest x-ray and arterial blood gases 

which demonstrated a 10 point decrease in his PO2 from his morning gases and a 

right lateral lower lung loculated pneumothorax.  A chest x-ray was inserted by 

the general surgeon Dr. Nam Thornton and the patient was continued on 

ventilatory support.  After after the chest x-ray was reported this morning a 

CT of the chest was done to evaluate the chest tube and to identify the area of 

the pneumothorax more clearly.  The CT indicated that the chest tube was 

located within the parenchyma of the right upper lobe and the right lower lobe 

was completely collapsed.  The patient's left lower lobe pneumonia was again 

noted.  With these findings it was determined that the patient should be 

transferred to a facility offering a substantially higher level of care as he 

will require the fairly immediate services of a pulmonologist for bronchoscopy 

to better evaluate his lobar collapse with pneumothorax formation on the right 

as well as a thoracic surgeon who may be required to repair the parenchymal 

leak in the right lower lobe as well as manage the chest tubes and other 

surgical interventions that may be required.  To this end transfer has been 

arranged to [facility] in Critical access hospital the accepting 

physician is Dr. Yessy Carmona MD. the patient will be transferred as soon as 

a bed is available.  The ICU will be notified of bed availability and at that 

time nursing report can be given and the patient can be transferred.





Physical Exam


Vital Signs: 


 











Temp Pulse Resp BP Pulse Ox


 


 99.0 F   95   24 H  123/72   95 


 


 10/20/18 14:00  10/20/18 08:00  10/20/18 14:00  10/20/18 13:57  10/20/18 14:00








 Intake & Output











 10/18/18 10/19/18 10/20/18





 23:59 23:59 23:59


 


Intake Total 2112.2 4303.2 1059


 


Output Total 4250 4830 1625


 


Balance -2137.8 -526.8 -566


 


Weight 74 kg 72.5 kg 72.4 kg











General appearance: PRESENT: other - Patient is sedated and endotracheally 

intubated for assisted mechanical ventilation.


Head exam: PRESENT: atraumatic, normocephalic


Eye exam: PRESENT: conjunctiva pink.  ABSENT: conjunctival injection, 

periorbital swelling, scleral icterus


Ear exam: PRESENT: normal external ear exam.  ABSENT: bleeding, drainage


Mouth exam: PRESENT: neck supple, other - Endotracheal tube affixed in good 

position.


Neck exam: ABSENT: JVD, thyromegaly, tracheal deviation


Respiratory exam: PRESENT: decreased breath sounds - Right lower lung fields 

anteriorly and posteriorly, symmetrical, other - Endotracheally intubated for 

mechanical ventilation.


Cardiovascular exam: PRESENT: RRR, tachycardia.  ABSENT: clicks, gallop, rubs


Pulses: PRESENT: normal carotid pulses, normal dorsalis pedis pul


GI/Abdominal exam: PRESENT: normal bowel sounds, soft


Rectal exam: PRESENT: deferred


Extremities exam: ABSENT: joint swelling, pedal edema


Musculoskeletal exam: ABSENT: deformity, dislocation


Neurological exam: PRESENT: reflexes normal, other - Sedated for ventilation


Psychiatric exam: PRESENT: other - Sedated for ventilation


Skin exam: ABSENT: jaundice, rash, urticaria





Results


Laboratory Results: 


 





 10/20/18 03:48 





 10/20/18 03:48 





 











  10/19/18 10/20/18 10/20/18





  17:40 00:00 03:48


 


WBC    12.1 H


 


RBC    5.12


 


Hgb    13.6


 


Hct    41.0


 


MCV    80


 


MCH    26.5 L


 


MCHC    33.1


 


RDW    15.8 H


 


Plt Count    298


 


Seg Neutrophils %    Not Reportable


 


Lymphocytes %    Not Reportable


 


Monocytes %    Not Reportable


 


Eosinophils %    Not Reportable


 


Basophils %    Not Reportable


 


Absolute Neutrophils    Not Reportable


 


Absolute Lymphocytes    Not Reportable


 


Absolute Monocytes    Not Reportable


 


Absolute Eosinophils    Not Reportable


 


Absolute Basophils    Not Reportable


 


Carbonic Acid  1.29  1.15 


 


HCO3/H2CO3 Ratio  23:1  23:1 


 


ABG pH  7.46 H  7.47 H 


 


ABG pCO2  42.9  38.1 


 


ABG pO2  60.6 L  79.5 L 


 


ABG HCO3  29.7 H  27.2 H 


 


ABG O2 Saturation  92.4 L  96.4 


 


ABG Base Excess  5.2  3.6 


 


FiO2  60%  80% 


 


Sodium   


 


Potassium   


 


Chloride   


 


Carbon Dioxide   


 


Anion Gap   


 


BUN   


 


Creatinine   


 


Est GFR ( Amer)   


 


Est GFR (Non-Af Amer)   


 


Glucose   


 


Calcium   


 


Magnesium   














  10/20/18 10/20/18





  03:48 05:30


 


WBC  


 


RBC  


 


Hgb  


 


Hct  


 


MCV  


 


MCH  


 


MCHC  


 


RDW  


 


Plt Count  


 


Seg Neutrophils %  


 


Lymphocytes %  


 


Monocytes %  


 


Eosinophils %  


 


Basophils %  


 


Absolute Neutrophils  


 


Absolute Lymphocytes  


 


Absolute Monocytes  


 


Absolute Eosinophils  


 


Absolute Basophils  


 


Carbonic Acid   1.14


 


HCO3/H2CO3 Ratio   24:1


 


ABG pH   7.48 H


 


ABG pCO2   38.0


 


ABG pO2   96.7


 


ABG HCO3   27.4 H


 


ABG O2 Saturation   97.8


 


ABG Base Excess   3.8


 


FiO2   80%


 


Sodium  139.4 


 


Potassium  4.8 


 


Chloride  104 


 


Carbon Dioxide  23 


 


Anion Gap  12 


 


BUN  26 H 


 


Creatinine  0.80 


 


Est GFR ( Amer)  > 60 


 


Est GFR (Non-Af Amer)  > 60 


 


Glucose  121 H 


 


Calcium  9.2 


 


Magnesium  2.5 H 








 











  10/16/18 10/16/18 10/16/18





  04:12 05:32 05:32


 


Creatine Kinase   304 H 


 


CK-MB (CK-2)    3.56


 


Troponin I    < 0.012


 


NT-Pro-B Natriuret Pep  191 H  














  10/16/18 10/16/18 10/16/18





  11:19 11:19 17:25


 


Creatine Kinase  272 H   239 H


 


CK-MB (CK-2)   3.86 


 


Troponin I   < 0.012 


 


NT-Pro-B Natriuret Pep   














  10/16/18





  17:25


 


Creatine Kinase 


 


CK-MB (CK-2)  3.28


 


Troponin I  < 0.012


 


NT-Pro-B Natriuret Pep 











Impressions: 


 





Chest/Abdomen CTA  10/18/18 09:15


IMPRESSION:  No PE.  Bilateral pneumonia.


 








Chest CT  10/20/18 00:00


IMPRESSION:  Right-sided chest tube courses through the right upper lobe 

parenchyma with tip between the SVC and right upper lobe pulmonary artery.


Complete collapse right lower lobe


Left lower lobe pneumonia


Trace right apical and basilar pneumothorax.  Extensive chest wall air.


Results discussed with Dr. Thornton.


 








Chest X-Ray  10/20/18 06:00


IMPRESSION: 


 


No significant change. Persistent right basilar pneumothorax


despite chest tube present.


 














Plan


Discharge Plan: 





Patient will be transferred to [facility] in Critical access hospital as 

soon as an appropriate level of care bed is available.


Time Spent: Greater than 30 Minutes

## 2018-10-20 NOTE — RADIOLOGY REPORT (SQ)
CLINICAL HISTORY:  status asthma 



COMPARISON: October 19, 2018.



TECHNIQUE: XR CHEST 1 VIEW 10/20/2018 6:00 AM CDT



FINDINGS: 



Cardiac silhouette is normal in size. There is subcutaneous

emphysema within the chest. There is no pleural effusion. There

is a moderate right basilar pneumothorax. There are no acute

osseous findings. Endotracheal tube is in place. NG tube is

unchanged.. There is a right chest tube in place there is a

mildly comminuted right clavicle fracture.



IMPRESSION: 



No significant change. Persistent right basilar pneumothorax

despite chest tube present.

## 2018-10-20 NOTE — PDOC PROGRESS REPORT
Subjective


Progress Note for:: 10/20/18


Subjective:: 





intubated, sedated


Reason For Visit: 


ASTHMA EXACERBATION,ACUTE RESPIRATORY FAILURE,








Physical Exam


Vital Signs: 


 











Temp Pulse Resp BP Pulse Ox


 


 99.5 F   95   29 H  146/90 H  95 


 


 10/20/18 10:00  10/20/18 08:00  10/20/18 10:00  10/20/18 09:37  10/20/18 10:00








 Intake & Output











 10/19/18 10/20/18 10/21/18





 06:59 06:59 06:59


 


Intake Total 2158.2 4715.2 96


 


Output Total 4650 4305 125


 


Balance -2491.8 410.2 -29


 


Weight 72.5 kg 72.4 kg 











Neck exam: PRESENT: other - large bilateral subcutaneous emphysema extending 

from jaw to upper chest


Respiratory exam: PRESENT: decreased breath sounds - biilaterally, rhonchi - on 

the right with wheezing





Results


Laboratory Results: 


 





 10/20/18 03:48 





 10/20/18 03:48 





 











  10/19/18 10/20/18 10/20/18





  17:40 00:00 03:48


 


WBC    12.1 H


 


RBC    5.12


 


Hgb    13.6


 


Hct    41.0


 


MCV    80


 


MCH    26.5 L


 


MCHC    33.1


 


RDW    15.8 H


 


Plt Count    298


 


Seg Neutrophils %    Not Reportable


 


Lymphocytes %    Not Reportable


 


Monocytes %    Not Reportable


 


Eosinophils %    Not Reportable


 


Basophils %    Not Reportable


 


Absolute Neutrophils    Not Reportable


 


Absolute Lymphocytes    Not Reportable


 


Absolute Monocytes    Not Reportable


 


Absolute Eosinophils    Not Reportable


 


Absolute Basophils    Not Reportable


 


Carbonic Acid  1.29  1.15 


 


HCO3/H2CO3 Ratio  23:1  23:1 


 


ABG pH  7.46 H  7.47 H 


 


ABG pCO2  42.9  38.1 


 


ABG pO2  60.6 L  79.5 L 


 


ABG HCO3  29.7 H  27.2 H 


 


ABG O2 Saturation  92.4 L  96.4 


 


ABG Base Excess  5.2  3.6 


 


FiO2  60%  80% 


 


Sodium   


 


Potassium   


 


Chloride   


 


Carbon Dioxide   


 


Anion Gap   


 


BUN   


 


Creatinine   


 


Est GFR ( Amer)   


 


Est GFR (Non-Af Amer)   


 


Glucose   


 


Calcium   


 


Magnesium   














  10/20/18 10/20/18





  03:48 05:30


 


WBC  


 


RBC  


 


Hgb  


 


Hct  


 


MCV  


 


MCH  


 


MCHC  


 


RDW  


 


Plt Count  


 


Seg Neutrophils %  


 


Lymphocytes %  


 


Monocytes %  


 


Eosinophils %  


 


Basophils %  


 


Absolute Neutrophils  


 


Absolute Lymphocytes  


 


Absolute Monocytes  


 


Absolute Eosinophils  


 


Absolute Basophils  


 


Carbonic Acid   1.14


 


HCO3/H2CO3 Ratio   24:1


 


ABG pH   7.48 H


 


ABG pCO2   38.0


 


ABG pO2   96.7


 


ABG HCO3   27.4 H


 


ABG O2 Saturation   97.8


 


ABG Base Excess   3.8


 


FiO2   80%


 


Sodium  139.4 


 


Potassium  4.8 


 


Chloride  104 


 


Carbon Dioxide  23 


 


Anion Gap  12 


 


BUN  26 H 


 


Creatinine  0.80 


 


Est GFR ( Amer)  > 60 


 


Est GFR (Non-Af Amer)  > 60 


 


Glucose  121 H 


 


Calcium  9.2 


 


Magnesium  2.5 H 








 











  10/16/18 10/16/18 10/16/18





  04:12 05:32 05:32


 


Creatine Kinase   304 H 


 


CK-MB (CK-2)    3.56


 


Troponin I    < 0.012


 


NT-Pro-B Natriuret Pep  191 H  














  10/16/18 10/16/18 10/16/18





  11:19 11:19 17:25


 


Creatine Kinase  272 H   239 H


 


CK-MB (CK-2)   3.86 


 


Troponin I   < 0.012 


 


NT-Pro-B Natriuret Pep   














  10/16/18





  17:25


 


Creatine Kinase 


 


CK-MB (CK-2)  3.28


 


Troponin I  < 0.012


 


NT-Pro-B Natriuret Pep 











Impressions: 


 





Chest/Abdomen CTA  10/18/18 09:15


IMPRESSION:  No PE.  Bilateral pneumonia.


 








Chest X-Ray  10/20/18 06:00


IMPRESSION: 


 


No significant change. Persistent right basilar pneumothorax


despite chest tube present.


 














Assessment & Plan





- Diagnosis


(1) Pneumothorax on right


Is this a current diagnosis for this admission?: Yes   





- Plan Summary


Plan Summary: 





A/





right pneumothorax, localized lower chest despite chest tube placement


s/p right thoracostomy tube placement


large air leak on Pleurovac


chest Xray shows loculated right lower chest pneumothorax persisting despite 

chest tube








P/





D/w Hospitalist and Dr. Samuel (Pulmonologist


Plan to obtain noncontrast CT chest to identify position of pneumothorax STAT 

today


After the CT scan, my recommendation is that patient be transferred to a 

facility with Thoracic Surgery availability

## 2018-10-20 NOTE — PROGRESS NOTE
Provider Note


Provider Note: 





Dr. Samuel from the pulmonary department called the nurse about midnight after 

reevaluating the patient, he let her know that the patient has a bronchial 

pleural fistula and need to be transferred to a tertiary facility.  I called 

vitamins and he spoke with the cardiothoracic surgeon Dr. Rivera who tells 

me that before transferring the patient to his service the patient needs a 

bronchoscopy to establish this diagnosis.


Dr. Samuel is not going to be available today.

## 2018-11-05 ENCOUNTER — HOSPITAL ENCOUNTER (EMERGENCY)
Dept: HOSPITAL 62 - ER | Age: 38
Discharge: HOME | End: 2018-11-05
Payer: MEDICARE

## 2018-11-05 VITALS — DIASTOLIC BLOOD PRESSURE: 59 MMHG | SYSTOLIC BLOOD PRESSURE: 106 MMHG

## 2018-11-05 DIAGNOSIS — Z98.890: ICD-10-CM

## 2018-11-05 DIAGNOSIS — Z48.02: Primary | ICD-10-CM

## 2018-11-05 DIAGNOSIS — J45.909: ICD-10-CM

## 2018-11-05 NOTE — ER DOCUMENT REPORT
ED Wound





- General


Chief Complaint: Suture Removal


Stated Complaint: SUTURE REMOVAL


Time Seen by Provider: 11/05/18 13:55


Mode of Arrival: Ambulatory


Information source: Patient


Notes: 





Patient presents to the ED for suture removal. Had chest tubes and ecmo placed 

3 weeks ago in [place]. Needs remaining sutures removed. 


TRAVEL OUTSIDE OF THE [place] IN LAST 30 DAYS: No





- HPI


Patient complains to provider of: Other - suture removal.


Occurred: Other - 3 weeks ago


Quality of pain: No pain


Severity: None


Pain Level: Denies


Skin Color: Normal


Capillary refill: < 3 seconds


Sensations intact: Yes


Distal pulses present: Yes


Associated Symptoms: None





- Related Data


Allergies/Adverse Reactions: 


 





tramadol Allergy (Verified 04/16/18 13:08)


 











Past Medical History





- General


Information source: Patient





- Social History


Smoking Status: Never Smoker


Frequency of alcohol use: None


Drug Abuse: None


Family History: Hypertension, Other - Unobtainable


Patient has suicidal ideation: No


Patient has homicidal ideation: No


Pulmonary Medical History: Reports: Hx Asthma, Hx Bronchitis


   Denies: Hx COPD, Hx Pneumonia, Hx Intubation, Hx Respiratory Failure, Hx 

Sleep Apnea, Hx Tuberculosis


Neurological Medical History: Denies: Hx Cerebrovascular Accident, Hx Migraine, 

Hx Seizures


Endocrine Medical History: Denies: Hx Diabetes Mellitus Type 1, Hx Diabetes 

Mellitus Type 2, Hx Graves' Disease, Hx Hyperthyroidism, Hx Hypothyroidism


Renal/ Medical History: Denies: Hx Peritoneal Dialysis


Psychiatric Medical History: Reports: Hx Bipolar Disorder


Infectious Medical History: Reports: Hx MRSA


Past Surgical History: Reports: Hx Abdominal Surgery - umbilical hernia repair, 

Hx Orthopedic Surgery - R clavical; tibia





- Immunizations


Hx Diphtheria, Pertussis, Tetanus Vaccination: Yes


Hx Pneumococcal Vaccination: 10/01/11





Review of Systems





- Review of Systems


Constitutional: No symptoms reported


EENT: No symptoms reported


Cardiovascular: No symptoms reported


Respiratory: No symptoms reported


Gastrointestinal: No symptoms reported


Genitourinary: No symptoms reported


Male Genitourinary: No symptoms reported


Musculoskeletal: No symptoms reported


Skin: No symptoms reported


Hematologic/Lymphatic: No symptoms reported


Neurological/Psychological: No symptoms reported


-: Yes All other systems reviewed and negative





Physical Exam





- Vital signs


Vitals: 





 











Temp Pulse Resp BP Pulse Ox


 


 98.3 F   76   16   106/59 L  98 


 


 11/05/18 13:26  11/05/18 13:26  11/05/18 13:26  11/05/18 13:26  11/05/18 13:26














- Notes


Notes: 


PHYSICAL EXAMINATION:





GENERAL: Well-appearing, well-nourished and in no acute distress.





HEAD: Atraumatic, normocephalic.





EYES: Pupils equal round and reactive to light, extraocular movements intact, 

sclera anicteric, conjunctiva are normal.





ENT: Nares patent, oropharynx clear without exudates.  Moist mucous membranes.





NECK: Normal range of motion, supple without lymphadenopathy





LUNGS: Breath sounds clear to auscultation bilaterally and equal.  No wheezes 

rales or rhonchi.





HEART: Regular rate and rhythm without murmurs





ABDOMEN: Soft, nontender, nondistended abdomen.  No guarding, no rebound.  No 

masses appreciated.





Musculoskeletal: Normal range of motion, no pitting or edema.  No cyanosis.





NEUROLOGICAL: Cranial nerves grossly intact.  Normal speech, normal gait.  

Normal sensory, motor exams 





PSYCH: Normal mood, normal affect.





SKIN: Warm, Dry, normal turgor, no rashes or lesions noted. Sutures are in 

place. wounds are clean, dry, intact, without signs of infection. 





Course





- Re-evaluation


Re-evalutation: 





11/05/18 14:13


Sutures removed from the right neck where echo male was done and the right 

lateral chest.  No complications.  No signs of infection.





- Vital Signs


Vital signs: 





 











Temp Pulse Resp BP Pulse Ox


 


 98.3 F   76   16   106/59 L  98 


 


 11/05/18 13:26  11/05/18 13:26  11/05/18 13:26  11/05/18 13:26  11/05/18 13:26














Discharge





- Discharge


Clinical Impression: 


 Visit for suture removal





Condition: Good


Disposition: HOME, SELF-CARE


Instructions:  Suture Removal


Referrals: 


STEVE EATON MD [ACTIVE STAFF] - Follow up as needed

## 2018-11-10 ENCOUNTER — HOSPITAL ENCOUNTER (EMERGENCY)
Dept: HOSPITAL 62 - ER | Age: 38
LOS: 1 days | Discharge: ANOTHER HOSPITAL | End: 2018-11-11
Payer: MEDICARE

## 2018-11-10 DIAGNOSIS — R07.9: ICD-10-CM

## 2018-11-10 DIAGNOSIS — R60.9: ICD-10-CM

## 2018-11-10 DIAGNOSIS — Z86.14: ICD-10-CM

## 2018-11-10 DIAGNOSIS — J90: Primary | ICD-10-CM

## 2018-11-10 LAB
%HYPO/RBC NFR BLD AUTO: (no result) %
ABSOLUTE LYMPHOCYTES# (MANUAL): 0.2 10^3/UL (ref 0.5–4.7)
ABSOLUTE MONOCYTES # (MANUAL): 0.1 10^3/UL (ref 0.1–1.4)
ABSOLUTE NEUTROPHILS# (MANUAL): 6 10^3/UL (ref 1.7–8.2)
ADD MANUAL DIFF: YES
ALBUMIN SERPL-MCNC: 4.2 G/DL (ref 3.5–5)
ALP SERPL-CCNC: 59 U/L (ref 38–126)
ALT SERPL-CCNC: 25 U/L (ref 21–72)
ANION GAP SERPL CALC-SCNC: 11 MMOL/L (ref 5–19)
ANISOCYTOSIS BLD QL SMEAR: (no result)
APPEARANCE UR: CLEAR
APTT PPP: YELLOW S
AST SERPL-CCNC: 24 U/L (ref 17–59)
BASOPHILS NFR BLD MANUAL: 0 % (ref 0–2)
BILIRUB DIRECT SERPL-MCNC: 0.3 MG/DL (ref 0–0.4)
BILIRUB SERPL-MCNC: 0.3 MG/DL (ref 0.2–1.3)
BILIRUB UR QL STRIP: NEGATIVE
BUN SERPL-MCNC: 9 MG/DL (ref 7–20)
CALCIUM: 9.6 MG/DL (ref 8.4–10.2)
CHLORIDE SERPL-SCNC: 105 MMOL/L (ref 98–107)
CK SERPL-CCNC: 131 U/L (ref 55–170)
CO2 SERPL-SCNC: 28 MMOL/L (ref 22–30)
DACRYOCYTES BLD QL SMEAR: SLIGHT
EOSINOPHIL NFR BLD MANUAL: 0 % (ref 0–6)
ERYTHROCYTE [DISTWIDTH] IN BLOOD BY AUTOMATED COUNT: 17.6 % (ref 11.5–14)
GLUCOSE SERPL-MCNC: 184 MG/DL (ref 75–110)
GLUCOSE UR STRIP-MCNC: NEGATIVE MG/DL
HCT VFR BLD CALC: 31.3 % (ref 37.9–51)
HGB BLD-MCNC: 10.2 G/DL (ref 13.5–17)
KETONES UR STRIP-MCNC: NEGATIVE MG/DL
MCH RBC QN AUTO: 26.8 PG (ref 27–33.4)
MCHC RBC AUTO-ENTMCNC: 32.5 G/DL (ref 32–36)
MCV RBC AUTO: 82 FL (ref 80–97)
MONOCYTES % (MANUAL): 1 % (ref 3–13)
NEUTS HYPERSEG BLD QL SMEAR: PRESENT
NITRITE UR QL STRIP: NEGATIVE
OVALOCYTES BLD QL SMEAR: (no result)
PH UR STRIP: 7 [PH] (ref 5–9)
PLATELET # BLD: 535 10^3/UL (ref 150–450)
PLATELET COMMENT: (no result)
POIKILOCYTOSIS BLD QL SMEAR: (no result)
POLYCHROMASIA BLD QL SMEAR: SLIGHT
POTASSIUM SERPL-SCNC: 4.8 MMOL/L (ref 3.6–5)
PROT SERPL-MCNC: 7.2 G/DL (ref 6.3–8.2)
PROT UR STRIP-MCNC: NEGATIVE MG/DL
RBC # BLD AUTO: 3.8 10^6/UL (ref 4.35–5.55)
ROULEAUX BLD QL SMEAR: (no result)
SEGMENTED NEUTROPHILS % (MAN): 96 % (ref 42–78)
SODIUM SERPL-SCNC: 143.7 MMOL/L (ref 137–145)
SP GR UR STRIP: 1.01
TOTAL CELLS COUNTED BLD: 100
TOXIC GRANULES BLD QL SMEAR: SLIGHT
UROBILINOGEN UR-MCNC: NEGATIVE MG/DL (ref ?–2)
VARIANT LYMPHS NFR BLD MANUAL: 3 % (ref 13–45)
WBC # BLD AUTO: 6.2 10^3/UL (ref 4–10.5)
WBC TOXIC VACUOLES BLD QL SMEAR: PRESENT

## 2018-11-10 PROCEDURE — 71046 X-RAY EXAM CHEST 2 VIEWS: CPT

## 2018-11-10 PROCEDURE — 82550 ASSAY OF CK (CPK): CPT

## 2018-11-10 PROCEDURE — 96367 TX/PROPH/DG ADDL SEQ IV INF: CPT

## 2018-11-10 PROCEDURE — 71275 CT ANGIOGRAPHY CHEST: CPT

## 2018-11-10 PROCEDURE — 99285 EMERGENCY DEPT VISIT HI MDM: CPT

## 2018-11-10 PROCEDURE — 80048 BASIC METABOLIC PNL TOTAL CA: CPT

## 2018-11-10 PROCEDURE — 83735 ASSAY OF MAGNESIUM: CPT

## 2018-11-10 PROCEDURE — 84484 ASSAY OF TROPONIN QUANT: CPT

## 2018-11-10 PROCEDURE — 93005 ELECTROCARDIOGRAM TRACING: CPT

## 2018-11-10 PROCEDURE — 96365 THER/PROPH/DIAG IV INF INIT: CPT

## 2018-11-10 PROCEDURE — 83880 ASSAY OF NATRIURETIC PEPTIDE: CPT

## 2018-11-10 PROCEDURE — 36415 COLL VENOUS BLD VENIPUNCTURE: CPT

## 2018-11-10 PROCEDURE — 96366 THER/PROPH/DIAG IV INF ADDON: CPT

## 2018-11-10 PROCEDURE — 96375 TX/PRO/DX INJ NEW DRUG ADDON: CPT

## 2018-11-10 PROCEDURE — 85025 COMPLETE CBC W/AUTO DIFF WBC: CPT

## 2018-11-10 PROCEDURE — 81001 URINALYSIS AUTO W/SCOPE: CPT

## 2018-11-10 PROCEDURE — 80053 COMPREHEN METABOLIC PANEL: CPT

## 2018-11-10 PROCEDURE — 87040 BLOOD CULTURE FOR BACTERIA: CPT

## 2018-11-10 PROCEDURE — 93010 ELECTROCARDIOGRAM REPORT: CPT

## 2018-11-10 RX ADMIN — MORPHINE SULFATE PRN MG: 10 INJECTION INTRAMUSCULAR; INTRAVENOUS; SUBCUTANEOUS at 20:41

## 2018-11-10 RX ADMIN — MORPHINE SULFATE PRN MG: 10 INJECTION INTRAMUSCULAR; INTRAVENOUS; SUBCUTANEOUS at 23:29

## 2018-11-10 NOTE — ER DOCUMENT REPORT
ED Medical Screen (RME)





- General


Chief Complaint: Chest Pain


Stated Complaint: CHEST PAIN


Time Seen by Provider: 11/10/18 18:13


Notes: 





38-year-old male asthmatic reports right-sided chest pain with swelling to his 

lower extremities today.


His EKG is completely normal.


His history is significant for seen here on 10/25/2018 with severe asthma, 

respiratory failure, intubation.  Was transferred to [facility] and placed on ECMO 

for almost a week, had chest tubes placed for pneumothorax.


He has never had peripheral edema before.


He states his breathing is doing well today, he has used his inhaler twice.








I have greeted and performed a rapid initial assessment of this patient.  A 

comprehensive ED assessment and evaluation of the patient, analysis of test 

results and completion of the medical decision making process will be conducted 

by additional ED providers.


TRAVEL OUTSIDE OF THE [place] IN LAST 30 DAYS: No





- Related Data


Allergies/Adverse Reactions: 


 





tramadol Allergy (Verified 11/10/18 17:59)


 











Past Medical History





- Social History


Frequency of alcohol use: None


Drug Abuse: None


Pulmonary Medical History: Reports: Hx Asthma, Hx Bronchitis


   Denies: Hx COPD, Hx Pneumonia, Hx Intubation, Hx Respiratory Failure, Hx 

Sleep Apnea, Hx Tuberculosis


Neurological Medical History: Denies: Hx Cerebrovascular Accident, Hx Migraine, 

Hx Seizures


Endocrine Medical History: Denies: Hx Diabetes Mellitus Type 1, Hx Diabetes 

Mellitus Type 2, Hx Graves' Disease, Hx Hyperthyroidism, Hx Hypothyroidism


Renal/ Medical History: Denies: Hx Peritoneal Dialysis


Psychiatric Medical History: Reports: Hx Bipolar Disorder


Infectious Medical History: Reports: Hx MRSA


Past Surgical History: Reports: Hx Abdominal Surgery - umbilical hernia repair, 

Hx Orthopedic Surgery - R clavical; tibia





- Immunizations


Hx Diphtheria, Pertussis, Tetanus Vaccination: Yes


History of Influenza Vaccine for 10/2017 - 3/2018 Season: No





Physical Exam





- Vital signs


Vitals: 





 











Temp Pulse Resp BP Pulse Ox


 


 98.3 F   69   16   128/77 H  96 


 


 11/10/18 18:02  11/10/18 18:02  11/10/18 18:02  11/10/18 18:02  11/10/18 18:02














Course





- Vital Signs


Vital signs: 





 











Temp Pulse Resp BP Pulse Ox


 


 98.3 F   69   16   128/77 H  96 


 


 11/10/18 18:02  11/10/18 18:02  11/10/18 18:02  11/10/18 18:02  11/10/18 18:02

## 2018-11-10 NOTE — RADIOLOGY REPORT (SQ)
EXAM DESCRIPTION:  CHEST 2 VIEWS



COMPLETED DATE/TIME:  11/10/2018 7:16 pm



REASON FOR STUDY:  Peripheral edema



COMPARISON:  10/20/2018 and earlier



EXAM PARAMETERS:  NUMBER OF VIEWS: two views

TECHNIQUE: Digital Frontal and Lateral radiographic views of the chest acquired.

RADIATION DOSE: NA

LIMITATIONS: none



FINDINGS:  LUNGS AND PLEURA:  New small right pleural effusion with linear atelectasis.  No left pleu
ral effusion.  No focal consolidation or pneumothorax.

MEDIASTINUM AND HILAR STRUCTURES: No masses or contour abnormalities.

HEART AND VASCULAR STRUCTURES: Heart normal size.  No evidence for failure.

BONES: No acute findings.

HARDWARE: None in the chest.

OTHER: No other significant finding.



IMPRESSION:  New small right pleural effusion.



TECHNICAL DOCUMENTATION:  JOB ID:  [id number]

 2011 [employer]- All Rights Reserved



Reading location - IP/workstation name: [email or web address]

## 2018-11-10 NOTE — RADIOLOGY REPORT (SQ)
EXAM DESCRIPTION: 



CT CHEST ANGIOGRAPHY WITHOUT THEN WITH IV CONTRAST



COMPLETED DATE/TME:  11/10/2018 20:25



CLINICAL HISTORY: 



38 years, Male, pleural effusion, tachy



COMPARISON:

10/20/2018.



TECHNIQUE:

CT angiography of the pulmonary arteries was performed following

intravenous administration of contrast. Coronal and bilateral

oblique maximum intensity projections (MIPS) were created. This

exam was performed according to our departmental dose

optimization program which includes use of automated exposure

control, adjustment of the mA and/or kV according to patient size

and/or use of iterative reconstruction technique.

  Images stored on PACS.

 

All CT scanners at this facility use dose modulation, iterative

reconstruction, and/or weight based dosing when appropriate to

reduce radiation dose to as low as reasonably achievable (ALARA).





[facility]: Dose Right [facility]: CareDose   [facility]: Dose Right    [facility]:

Teradose 4D    [facility]: Smart Technologies



LIMITATIONS:

None.



FINDINGS:



Chest:



Evaluation through the lungs reveals right-sided pleural effusion

with subpulmonic component, portions of which are not layering

dependently raising the question of loculation. Associated

basilar subsegmental atelectasis and/or scarring present

bilaterally.



Focal area of cavitation, raising the question of septic embolism

measuring 19 x 17 mm, (series 3, image 78). Differential

considerations may include developing fungal cavitary lesion.



Patchy interstitial and airspace opacities as noted previously,

improved in comparison to the previous examination, however raise

the possibility of residual or recurrent infectious process

including aspiration pneumonia, bronchopneumonia, atypical

mycobacterial and other fungal infectious etiology.



The tracheobronchial airways reveal frothy fluid collection

within the bronchus intermedius and LEFT anterior subsegmental

bronchus and proximal trachea suggesting secretions. No

significant mediastinal or axillary lymphadenopathy by CT

measurement criteria.



Limited evaluation of the upper abdomen shows no acute

intra-abdominal abnormalities. 



The osseous structures again reveal comminuted fracture of the

RIGHT clavicle and multiple right-sided ribs.



CT angiography: Diagnostic CT angiography of the pulmonary

arteries without intraluminal filling defect noted to suggest

pulmonary arterial embolus.



IMPRESSION:



1. Diagnostic pulmonary angiography without findings to suggest

pulmonary arterial embolus.

2. Loculated RIGHT dependent and subpulmonic effusion.

3. Tree-in-bud type airspace opacification of the LEFT lower lobe

with differential and details as above.

4. Cavitary lesion within the LEFT lower lobe raising the

question of sequela of septic embolus or developing fungal

infectious process.

5. Frothy fluid collection present within the tracheal and

endobronchial tree raising the question of secretions or

aspirate.



TECHNICAL DOCUMENTATION:



Quality ID # 436: Final reports with documentation of one or more

dose reduction techniques (e.g., Automated exposure control,

adjustment of the mA and/or kV according to patient size, use of

iterative reconstruction technique)



 2011 [employer]- All Rights Reserved

## 2018-11-10 NOTE — ER DOCUMENT REPORT
ED General





- General


Chief Complaint: Chest Pain


Stated Complaint: CHEST PAIN


Time Seen by Provider: 11/10/18 18:13


Notes: 





Patient is a 38-year-old male with a past medical history of asthma, 

hospitalized in October for a severe asthma exacerbation requiring intubation, 

subsequent ventilator associated pneumothorax and pneumonia, transferred to 

[facility] where he ended up requiring ECMO, reports he was discharged 

approximately 2 weeks ago presents with 2 days of progressively worsening right-

sided chest discomfort, mild shortness of breath, and increasing bilateral 

lower extremity edema.  The patient reports that this started gradually and has 

been worsening since onset.  States he denies any history of similar symptoms 

since being discharged.  Nothing improves or worsens his symptoms.  He has not 

contacted his doctor regarding today's concern.  He denies fever or 

constitutional symptoms.  No hemoptysis but he has had a nonproductive cough.


TRAVEL OUTSIDE OF THE [place] IN LAST 30 DAYS: No





- Related Data


Allergies/Adverse Reactions: 


 





tramadol Allergy (Verified 11/10/18 17:59)


 











Past Medical History





- General


Information source: Patient





- Social History


Smoking Status: Former Smoker


Frequency of alcohol use: None


Drug Abuse: None


Lives with: Spouse/Significant other


Family History: Hypertension, Other - Unobtainable


Patient has suicidal ideation: No


Patient has homicidal ideation: No


Pulmonary Medical History: Reports: Hx Asthma, Hx Bronchitis


   Denies: Hx COPD, Hx Pneumonia, Hx Intubation, Hx Respiratory Failure, Hx 

Sleep Apnea, Hx Tuberculosis


Neurological Medical History: Denies: Hx Cerebrovascular Accident, Hx Migraine, 

Hx Seizures


Endocrine Medical History: Denies: Hx Diabetes Mellitus Type 1, Hx Diabetes 

Mellitus Type 2, Hx Graves' Disease, Hx Hyperthyroidism, Hx Hypothyroidism


Renal/ Medical History: Denies: Hx Peritoneal Dialysis


Psychiatric Medical History: Reports: Hx Bipolar Disorder


Infectious Medical History: Reports: Hx MRSA


Past Surgical History: Reports: Hx Abdominal Surgery - umbilical hernia repair, 

Hx Orthopedic Surgery - R clavical; tibia





- Immunizations


Hx Diphtheria, Pertussis, Tetanus Vaccination: Yes


Hx Pneumococcal Vaccination: 10/01/11





Review of Systems





- Review of Systems


Notes: 





Constitutional: Negative for fever.


HENT: Negative for sore throat.


Eyes: Negative for visual changes.


Cardiovascular: Positive for right-sided chest pain


Respiratory: Positive for mild shortness of breath


Gastrointestinal: Negative for abdominal pain, vomiting or diarrhea.


Genitourinary: Negative for dysuria.


Musculoskeletal: Positive for bilateral lower extremity edema


Skin: Negative for rash.


Neurological: Negative for headaches, weakness or numbness.





10 point ROS negative except as marked above and in HPI.





Physical Exam





- Vital signs


Vitals: 


 











Temp Pulse Resp BP Pulse Ox


 


 98.3 F   69   16   128/77 H  96 


 


 11/10/18 18:02  11/10/18 18:02  11/10/18 18:02  11/10/18 18:02  11/10/18 18:02











Interpretation: Normal


Notes: 





PHYSICAL EXAMINATION:





GENERAL: Well-appearing, well-nourished and in no acute distress.





HEAD: Atraumatic, normocephalic.





EYES: Pupils equal round and reactive to light, extraocular movements intact, 

sclera anicteric, conjunctiva are normal.





ENT: nares patent, oropharynx clear without exudates.  Moist mucous membranes.





NECK: Normal range of motion, supple without lymphadenopathy





LUNGS: Breath sounds diminished at the right base otherwise unremarkable lung 

examination.  No wheezes rales or rhonchi.





HEART: Regular rate and rhythm without murmurs





ABDOMEN: Soft, nontender, normoactive bowel sounds.  No guarding, no rebound.  

No masses appreciated.





EXTREMITIES: Normal range of motion, 1+ pitting edema in the bilateral lower 

extremities that is equal and symmetric.





NEUROLOGICAL: No focal neurological deficits. Moves all extremities 

spontaneously and on command.





PSYCH: Normal mood, normal affect.





SKIN: Warm, Dry, normal turgor, no rashes or lesions noted.





Course





- Re-evaluation


Re-evalutation: 





11/10/18 20:50


Patient presents with chronic right-sided chest wall pain although main concern 

is due to increasing bilateral lower extremity edema over the last 24 hours.  

The patient does state that he has right-sided chest pain that is chronic but 

seems to be somewhat worse in the last 24 hours.  States worsened by moving and 

breathing.  He is not tachycardic nor hypoxic.  Chest x-ray does show a new 

pleural effusion on the right which could be residual from his previous chest 

tubes and pneumothoraces to the right side.  However given his new onset 

bilateral lower extremity edema without any acute alternative etiology as well 

as worsening pain and recent hospitalization with prolonged period of 

immobilization patient is high risk for pulmonary embolus.  Will obtain a CTA 

to clarify both the pleural effusion and to further exclude an acute pulmonary 

embolus.  The remainder the patient's laboratories are otherwise completely 

unremarkable and do not suggest a volume overload picture to suggest a renal or 

liver dysfunction for the bilateral lower extremity edema.


11/10/18 23:06


Patient laboratories are effectively unremarkable although CT scan of the chest 

has a extraordinarily unusual read showing a loculated infusion on the right 

lower lobe as well as cavitary lesions and concern of possible sequelae of 

septic emboli.  There is also concern for possible fungal infection.  The 

patient does look remarkably well in this context, vitals are otherwise 

unremarkable although given this abnormal read the patient will require 

hospitalization on broad-spectrum antibiotics and antifungals as well as 

pulmonary consultation.


11/10/18 23:28


Discussed with Dr. Holman who is advised that given that the patient is a 

loculated effusion he will require transfer to a center capable of performing a 

VATS procedure.  I have contacted [facility] where the patient was previously 

hospitalized


11/11/18 00:53


I discussed with Dr. Betancourt at [facility] who has accepted the patient for 

transfer.  Patient is in agreement with transfer.


11/11/18 02:43


Patient remained stable, vitals including oxygenation within acceptable limits.

  Patient has received Zosyn, vancomycin and fluconazole.  Awaiting transport.





- Vital Signs


Vital signs: 


 











Temp Pulse Resp BP Pulse Ox


 


 98.7 F   52 L  16   149/77 H  97 


 


 11/11/18 02:22  11/11/18 01:26  11/10/18 18:02  11/11/18 01:01  11/11/18 01:01














- Laboratory


Result Diagrams: 


 11/10/18 18:30





 11/10/18 18:30


Laboratory results interpreted by me: 


 











  11/10/18 11/10/18 11/10/18





  18:23 18:30 18:30


 


RBC   3.80 L 


 


Hgb   10.2 L 


 


Hct   31.3 L 


 


MCH   26.8 L 


 


RDW   17.6 H 


 


Plt Count   535 H 


 


Seg Neuts % (Manual)   96 H 


 


Lymphocytes % (Manual)   3 L 


 


Monocytes % (Manual)   1 L 


 


Abs Lymphs (Manual)   0.2 L 


 


Glucose    184 H


 


NT-Pro-B Natriuret Pep   


 


Urine Ascorbic Acid  40 H  














  11/10/18





  20:02


 


RBC 


 


Hgb 


 


Hct 


 


MCH 


 


RDW 


 


Plt Count 


 


Seg Neuts % (Manual) 


 


Lymphocytes % (Manual) 


 


Monocytes % (Manual) 


 


Abs Lymphs (Manual) 


 


Glucose 


 


NT-Pro-B Natriuret Pep  713 H


 


Urine Ascorbic Acid 














- Diagnostic Test


Radiology reviewed: Image reviewed, Reports reviewed


Radiology results interpreted by me: 





11/11/18 02:43


Chest x-ray: Right lower pleural effusion





- EKG Interpretation by Me


Additional EKG results interpreted by me: 





11/11/18 02:43


Sinus rhythm.  Rate 68.  No ST elevations or depressions.  QTC is 417.





Discharge





- Discharge


Clinical Impression: 


 Loculated pleural effusion, Chest wall pain, Bilateral lower extremity edema





Condition: Fair


Disposition: [facility]

## 2018-11-11 VITALS — SYSTOLIC BLOOD PRESSURE: 150 MMHG | DIASTOLIC BLOOD PRESSURE: 89 MMHG

## 2018-11-11 LAB
ADD MANUAL DIFF: NO
ANION GAP SERPL CALC-SCNC: 9 MMOL/L (ref 5–19)
BASOPHILS # BLD AUTO: 0 10^3/UL (ref 0–0.2)
BASOPHILS NFR BLD AUTO: 0.4 % (ref 0–2)
BUN SERPL-MCNC: 10 MG/DL (ref 7–20)
CALCIUM: 8.7 MG/DL (ref 8.4–10.2)
CHLORIDE SERPL-SCNC: 106 MMOL/L (ref 98–107)
CO2 SERPL-SCNC: 29 MMOL/L (ref 22–30)
EOSINOPHIL # BLD AUTO: 0 10^3/UL (ref 0–0.6)
EOSINOPHIL NFR BLD AUTO: 0.2 % (ref 0–6)
ERYTHROCYTE [DISTWIDTH] IN BLOOD BY AUTOMATED COUNT: 17.4 % (ref 11.5–14)
GLUCOSE SERPL-MCNC: 92 MG/DL (ref 75–110)
HCT VFR BLD CALC: 28 % (ref 37.9–51)
HGB BLD-MCNC: 9.3 G/DL (ref 13.5–17)
LYMPHOCYTES # BLD AUTO: 1.4 10^3/UL (ref 0.5–4.7)
LYMPHOCYTES NFR BLD AUTO: 21.8 % (ref 13–45)
MCH RBC QN AUTO: 27.2 PG (ref 27–33.4)
MCHC RBC AUTO-ENTMCNC: 33.1 G/DL (ref 32–36)
MCV RBC AUTO: 82 FL (ref 80–97)
MONOCYTES # BLD AUTO: 0.4 10^3/UL (ref 0.1–1.4)
MONOCYTES NFR BLD AUTO: 6.1 % (ref 3–13)
NEUTROPHILS # BLD AUTO: 4.5 10^3/UL (ref 1.7–8.2)
NEUTS SEG NFR BLD AUTO: 71.5 % (ref 42–78)
PLATELET # BLD: 425 10^3/UL (ref 150–450)
POTASSIUM SERPL-SCNC: 3.8 MMOL/L (ref 3.6–5)
RBC # BLD AUTO: 3.42 10^6/UL (ref 4.35–5.55)
SODIUM SERPL-SCNC: 144.2 MMOL/L (ref 137–145)
TOTAL CELLS COUNTED % (AUTO): 100 %
WBC # BLD AUTO: 6.3 10^3/UL (ref 4–10.5)

## 2018-11-11 RX ADMIN — MORPHINE SULFATE PRN MG: 10 INJECTION INTRAMUSCULAR; INTRAVENOUS; SUBCUTANEOUS at 06:59

## 2018-11-11 RX ADMIN — PIPERACILLIN AND TAZOBACTAM SCH GM: 3; .375 INJECTION, POWDER, LYOPHILIZED, FOR SOLUTION INTRAVENOUS; PARENTERAL at 19:36

## 2018-11-11 RX ADMIN — MORPHINE SULFATE PRN MG: 10 INJECTION INTRAMUSCULAR; INTRAVENOUS; SUBCUTANEOUS at 03:11

## 2018-11-11 RX ADMIN — PIPERACILLIN AND TAZOBACTAM SCH GM: 3; .375 INJECTION, POWDER, LYOPHILIZED, FOR SOLUTION INTRAVENOUS; PARENTERAL at 12:12

## 2018-11-11 RX ADMIN — MORPHINE SULFATE PRN MG: 10 INJECTION INTRAMUSCULAR; INTRAVENOUS; SUBCUTANEOUS at 21:23

## 2018-11-11 RX ADMIN — MORPHINE SULFATE PRN MG: 10 INJECTION INTRAMUSCULAR; INTRAVENOUS; SUBCUTANEOUS at 12:08

## 2018-11-11 RX ADMIN — PIPERACILLIN AND TAZOBACTAM SCH GM: 3; .375 INJECTION, POWDER, LYOPHILIZED, FOR SOLUTION INTRAVENOUS; PARENTERAL at 07:00

## 2018-11-11 RX ADMIN — MORPHINE SULFATE PRN MG: 10 INJECTION INTRAMUSCULAR; INTRAVENOUS; SUBCUTANEOUS at 14:45

## 2018-11-11 RX ADMIN — MORPHINE SULFATE PRN MG: 10 INJECTION INTRAMUSCULAR; INTRAVENOUS; SUBCUTANEOUS at 19:42

## 2018-11-12 LAB — PATH REV BLD -IMP: (no result)

## 2018-12-08 ENCOUNTER — HOSPITAL ENCOUNTER (EMERGENCY)
Dept: HOSPITAL 62 - ER | Age: 38
Discharge: HOME | End: 2018-12-08
Payer: MEDICARE

## 2018-12-08 VITALS — SYSTOLIC BLOOD PRESSURE: 147 MMHG | DIASTOLIC BLOOD PRESSURE: 78 MMHG

## 2018-12-08 DIAGNOSIS — Z98.890: ICD-10-CM

## 2018-12-08 DIAGNOSIS — J45.909: ICD-10-CM

## 2018-12-08 DIAGNOSIS — Z87.891: ICD-10-CM

## 2018-12-08 DIAGNOSIS — R10.9: Primary | ICD-10-CM

## 2018-12-08 DIAGNOSIS — J90: ICD-10-CM

## 2018-12-08 LAB
ADD MANUAL DIFF: NO
ALBUMIN SERPL-MCNC: 4.1 G/DL (ref 3.5–5)
ALP SERPL-CCNC: 53 U/L (ref 38–126)
ALT SERPL-CCNC: 14 U/L (ref 21–72)
ANION GAP SERPL CALC-SCNC: 12 MMOL/L (ref 5–19)
APPEARANCE UR: (no result)
APTT PPP: YELLOW S
AST SERPL-CCNC: 23 U/L (ref 17–59)
BASOPHILS # BLD AUTO: 0 10^3/UL (ref 0–0.2)
BASOPHILS NFR BLD AUTO: 0.5 % (ref 0–2)
BILIRUB DIRECT SERPL-MCNC: 0.2 MG/DL (ref 0–0.4)
BILIRUB SERPL-MCNC: 0.3 MG/DL (ref 0.2–1.3)
BILIRUB UR QL STRIP: NEGATIVE
BUN SERPL-MCNC: 12 MG/DL (ref 7–20)
CALCIUM: 8.9 MG/DL (ref 8.4–10.2)
CHLORIDE SERPL-SCNC: 105 MMOL/L (ref 98–107)
CO2 SERPL-SCNC: 30 MMOL/L (ref 22–30)
EOSINOPHIL # BLD AUTO: 0 10^3/UL (ref 0–0.6)
EOSINOPHIL NFR BLD AUTO: 0 % (ref 0–6)
ERYTHROCYTE [DISTWIDTH] IN BLOOD BY AUTOMATED COUNT: 17.3 % (ref 11.5–14)
GLUCOSE SERPL-MCNC: 86 MG/DL (ref 75–110)
GLUCOSE UR STRIP-MCNC: NEGATIVE MG/DL
HCT VFR BLD CALC: 30 % (ref 37.9–51)
HGB BLD-MCNC: 9.9 G/DL (ref 13.5–17)
KETONES UR STRIP-MCNC: NEGATIVE MG/DL
LYMPHOCYTES # BLD AUTO: 1.3 10^3/UL (ref 0.5–4.7)
LYMPHOCYTES NFR BLD AUTO: 23.4 % (ref 13–45)
MCH RBC QN AUTO: 26.6 PG (ref 27–33.4)
MCHC RBC AUTO-ENTMCNC: 32.9 G/DL (ref 32–36)
MCV RBC AUTO: 81 FL (ref 80–97)
MONOCYTES # BLD AUTO: 0.5 10^3/UL (ref 0.1–1.4)
MONOCYTES NFR BLD AUTO: 9.3 % (ref 3–13)
NEUTROPHILS # BLD AUTO: 3.6 10^3/UL (ref 1.7–8.2)
NEUTS SEG NFR BLD AUTO: 66.8 % (ref 42–78)
NITRITE UR QL STRIP: NEGATIVE
PH UR STRIP: 5 [PH] (ref 5–9)
PLATELET # BLD: 263 10^3/UL (ref 150–450)
POTASSIUM SERPL-SCNC: 3.7 MMOL/L (ref 3.6–5)
PROT SERPL-MCNC: 6.6 G/DL (ref 6.3–8.2)
PROT UR STRIP-MCNC: NEGATIVE MG/DL
RBC # BLD AUTO: 3.71 10^6/UL (ref 4.35–5.55)
SODIUM SERPL-SCNC: 147 MMOL/L (ref 137–145)
SP GR UR STRIP: 1.02
TOTAL CELLS COUNTED % (AUTO): 100 %
UROBILINOGEN UR-MCNC: NEGATIVE MG/DL (ref ?–2)
WBC # BLD AUTO: 5.4 10^3/UL (ref 4–10.5)

## 2018-12-08 PROCEDURE — 85025 COMPLETE CBC W/AUTO DIFF WBC: CPT

## 2018-12-08 PROCEDURE — 80053 COMPREHEN METABOLIC PANEL: CPT

## 2018-12-08 PROCEDURE — 36415 COLL VENOUS BLD VENIPUNCTURE: CPT

## 2018-12-08 PROCEDURE — 81001 URINALYSIS AUTO W/SCOPE: CPT

## 2018-12-08 PROCEDURE — 99284 EMERGENCY DEPT VISIT MOD MDM: CPT

## 2018-12-08 PROCEDURE — 71046 X-RAY EXAM CHEST 2 VIEWS: CPT
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
## 2018-12-08 NOTE — RADIOLOGY REPORT (SQ)
EXAM DESCRIPTION:  CHEST 2 VIEWS



COMPLETED DATE/TIME:  12/8/2018 3:14 pm



REASON FOR STUDY:  Hx pleural effusion, now having same symptoms, SOB



COMPARISON:  11/10/2018



EXAM PARAMETERS:  NUMBER OF VIEWS: two views

TECHNIQUE: Digital Frontal and Lateral radiographic views of the chest acquired.

RADIATION DOSE: NA

LIMITATIONS: none



FINDINGS:  LUNGS AND PLEURA: Interval decrease in volume of a previously seen right pleural effusion,
 with a small persistent volume of fluid and persistent atelectasis or scarring of the right lung.

MEDIASTINUM AND HILAR STRUCTURES: No masses or contour abnormalities.

HEART AND VASCULAR STRUCTURES: Heart normal size.  No evidence for failure.

BONES: No acute findings.

HARDWARE: None in the chest.

OTHER: No other significant finding.



IMPRESSION:  Interval decrease in volume of a previously seen right pleural effusion, with a small pe
rsistent volume of fluid and persistent atelectasis or scarring of the right lung.



TECHNICAL DOCUMENTATION:  JOB ID:  [id number]

 2011 [employer]- All Rights Reserved



Reading location - IP/workstation name: [email or web address]
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
## 2019-01-22 ENCOUNTER — HOSPITAL ENCOUNTER (EMERGENCY)
Dept: HOSPITAL 62 - ER | Age: 39
Discharge: HOME | End: 2019-01-22
Payer: MEDICARE

## 2019-01-22 VITALS — SYSTOLIC BLOOD PRESSURE: 128 MMHG | DIASTOLIC BLOOD PRESSURE: 77 MMHG

## 2019-01-22 DIAGNOSIS — J45.21: Primary | ICD-10-CM

## 2019-01-22 DIAGNOSIS — Z86.14: ICD-10-CM

## 2019-01-22 LAB
ADD MANUAL DIFF: NO
ALBUMIN SERPL-MCNC: 4.3 G/DL (ref 3.5–5)
ALP SERPL-CCNC: 59 U/L (ref 38–126)
ALT SERPL-CCNC: 16 U/L (ref 21–72)
ANION GAP SERPL CALC-SCNC: 7 MMOL/L (ref 5–19)
AST SERPL-CCNC: 18 U/L (ref 17–59)
BASOPHILS # BLD AUTO: 0.1 10^3/UL (ref 0–0.2)
BASOPHILS NFR BLD AUTO: 0.9 % (ref 0–2)
BILIRUB DIRECT SERPL-MCNC: 0.3 MG/DL (ref 0–0.4)
BILIRUB SERPL-MCNC: 0.3 MG/DL (ref 0.2–1.3)
BUN SERPL-MCNC: 21 MG/DL (ref 7–20)
CALCIUM: 9.2 MG/DL (ref 8.4–10.2)
CHLORIDE SERPL-SCNC: 106 MMOL/L (ref 98–107)
CO2 SERPL-SCNC: 27 MMOL/L (ref 22–30)
EOSINOPHIL # BLD AUTO: 0 10^3/UL (ref 0–0.6)
EOSINOPHIL NFR BLD AUTO: 0.4 % (ref 0–6)
ERYTHROCYTE [DISTWIDTH] IN BLOOD BY AUTOMATED COUNT: 16.3 % (ref 11.5–14)
GLUCOSE SERPL-MCNC: 139 MG/DL (ref 75–110)
HCT VFR BLD CALC: 35.7 % (ref 37.9–51)
HGB BLD-MCNC: 11.6 G/DL (ref 13.5–17)
LYMPHOCYTES # BLD AUTO: 1.5 10^3/UL (ref 0.5–4.7)
LYMPHOCYTES NFR BLD AUTO: 19.1 % (ref 13–45)
MCH RBC QN AUTO: 25.7 PG (ref 27–33.4)
MCHC RBC AUTO-ENTMCNC: 32.6 G/DL (ref 32–36)
MCV RBC AUTO: 79 FL (ref 80–97)
MONOCYTES # BLD AUTO: 0.6 10^3/UL (ref 0.1–1.4)
MONOCYTES NFR BLD AUTO: 6.9 % (ref 3–13)
NEUTROPHILS # BLD AUTO: 5.8 10^3/UL (ref 1.7–8.2)
NEUTS SEG NFR BLD AUTO: 72.7 % (ref 42–78)
PLATELET # BLD: 344 10^3/UL (ref 150–450)
POTASSIUM SERPL-SCNC: 4 MMOL/L (ref 3.6–5)
PROT SERPL-MCNC: 7.1 G/DL (ref 6.3–8.2)
RBC # BLD AUTO: 4.53 10^6/UL (ref 4.35–5.55)
SODIUM SERPL-SCNC: 140.4 MMOL/L (ref 137–145)
TOTAL CELLS COUNTED % (AUTO): 100 %
WBC # BLD AUTO: 8 10^3/UL (ref 4–10.5)

## 2019-01-22 PROCEDURE — 80053 COMPREHEN METABOLIC PANEL: CPT

## 2019-01-22 PROCEDURE — 71046 X-RAY EXAM CHEST 2 VIEWS: CPT

## 2019-01-22 PROCEDURE — 36415 COLL VENOUS BLD VENIPUNCTURE: CPT

## 2019-01-22 PROCEDURE — 96365 THER/PROPH/DIAG IV INF INIT: CPT

## 2019-01-22 PROCEDURE — 94640 AIRWAY INHALATION TREATMENT: CPT

## 2019-01-22 PROCEDURE — 96375 TX/PRO/DX INJ NEW DRUG ADDON: CPT

## 2019-01-22 PROCEDURE — 99285 EMERGENCY DEPT VISIT HI MDM: CPT

## 2019-01-22 PROCEDURE — 85025 COMPLETE CBC W/AUTO DIFF WBC: CPT

## 2019-01-22 NOTE — ER DOCUMENT REPORT
ED General





- General


Chief Complaint: Cold Symptoms


Stated Complaint: DIFFICULTY BREATHING


Time Seen by Provider: 01/22/19 08:39


TRAVEL OUTSIDE OF THE [place] IN LAST 30 DAYS: No





- HPI


Notes: 





Patient is a 38-year-old male with a history of asthma who presents the 

emergency department complaining of asthma exacerbation over the last few days. 

Patient states that he has had a dry semi-productive cough with associated 

wheezing.  Patient states that he does not feel short of breath or have chest 

pain, but does feel some tightness which is normal for him during the asthma 

exacerbation.  He is otherwise eating and drinking without difficulty.  He is 

urinating normally and having normal.  Patient has been doing DuoNeb treatments 

and he did take 1 dose of prednisone about a day and a half ago.  Patient states

that he has been intubated a few months ago because of the severe asthma 

exacerbation.  Patient states that he does not feel as bad as he did when he 

needed intubated.  No other concerns or complaints.  Denies any headache, fever,

neck pain, URI, sore throat, chest pain, palpitations, syncope, abdominal pain, 

nausea/vomiting/diarrhea, urinary retention, dysuria, hematuria, back pain, or 

rash.





- Related Data


Allergies/Adverse Reactions: 


                                        





tramadol Allergy (Verified 01/22/19 07:47)


   











Past Medical History





- Social History


Smoking Status: Never Smoker


Family History: Hypertension, Other - Unobtainable


Pulmonary Medical History: Reports: Hx Asthma, Hx Bronchitis


   Denies: Hx COPD, Hx Pneumonia, Hx Intubation, Hx Respiratory Failure, Hx 

Sleep Apnea, Hx Tuberculosis


Neurological Medical History: Denies: Hx Cerebrovascular Accident, Hx Migraine, 

Hx Seizures


Endocrine Medical History: Denies: Hx Diabetes Mellitus Type 1, Hx Diabetes 

Mellitus Type 2, Hx Graves' Disease, Hx Hyperthyroidism, Hx Hypothyroidism


Renal/ Medical History: Denies: Hx Peritoneal Dialysis


Psychiatric Medical History: Reports: Hx Bipolar Disorder


Infectious Medical History: Reports: Hx MRSA


Past Surgical History: Reports: Hx Abdominal Surgery - umbilical hernia repair, 

Hx Orthopedic Surgery - R clavical; tibia





- Immunizations


Hx Diphtheria, Pertussis, Tetanus Vaccination: Yes


Hx Pneumococcal Vaccination: 10/01/11





Review of Systems





- Review of Systems


-: Yes All other systems reviewed and negative





Physical Exam





- Vital signs


Vitals: 


                                        











Temp Pulse Resp BP Pulse Ox


 


 98.2 F   81   24 H  137/88 H  96 


 


 01/22/19 07:55  01/22/19 07:55  01/22/19 07:55  01/22/19 07:55  01/22/19 07:55














- Notes


Notes: 





PHYSICAL EXAMINATION:





GENERAL: Well-appearing, well-nourished and in no acute distress.  A&Ox4.  

Answers questions appropriately.  Moves comfortably w/o notable distress





HEAD: Atraumatic, normocephalic.





EYES: Pupils equal round and reactive to light, extraocular movements intact, 

sclera anicteric, conjunctiva are normal.





ENT: EAC clear b/l.  TM's intact b/l without erythema, fluid, or perforation.  

Nares patent and without discharge.  oropharynx no erythema without exudates.  

No tonsilar hypertrophy without erythema or exudate.  No palatine shift.  Uvula 

midline.  No tongue protrusion.  No drooling, hoarseness, or airway compromise. 

Moist mucous membranes.  No sinus tenderness.





NECK: Normal range of motion, supple without lymphadenopathy.  No 

rigidity/meningismus.





LUNGS: Wheezing throughout.  No retractions





HEART: Regular rate and rhythm without murmurs, rubs, gallops.





ABDOMEN: Soft, nontender, nondistended abdomen.  No guarding, no rebound.  No 

masses appreciated.  Normal bowel sounds present.  No CVA tenderness 

bilaterally.  





NEUROLOGICAL: Normal speech, normal gait.  Normal sensory, motor exams 





PSYCH: Normal mood, normal affect.





SKIN: Warm, Dry, normal turgor, no rashes or lesions noted.





Course





- Re-evaluation


Re-evalutation: 





01/22/19 10:46


Patient is an afebrile, well-hydrated, 38-year-old male who presents with an 

asthma exacerbation.  Vitals are acceptable without significant tachycardia, 

tachypnea, or hypoxia.  PE is otherwise unremarkable.  Patient is nontoxic-

appearing and is tolerating p.o. without difficulty.  Chest x-ray was 

unremarkable.  Lab work also unremarkable.  Patient has received steroids, 

magnesium, and breathing treatments..  His lungs are now clear to auscultation 

bilaterally.  Patient states he is feeling much better.  Patient states that he 

does have an inhaler at home.  No other labs or imaging warranted.  Low suspici

on for any ACS, PE, pneumothorax, pericarditis, dissection, respiratory 

compromise, severe dehydration, sepsis, meningitis, or other systemic emergent 

condition at this time.  Patient is aware that this condition can change from 

initial presentation and he needs to monitor symptoms closely and seek medical 

attention for any acute changes.  I will send him home with a prednisone taper. 

Recommend conservative measures for symptoms.  Recheck with your PCM in 3-5 

days.  Return to the ED with any worsening/concerning symptoms otherwise as 

reviewed in discharge.  Patient is in agreement.





- Vital Signs


Vital signs: 


                                        











Temp Pulse Resp BP Pulse Ox


 


 98.2 F   81   13   134/69 H  98 


 


 01/22/19 07:55  01/22/19 07:55  01/22/19 10:01  01/22/19 10:01  01/22/19 10:01














- Laboratory


Result Diagrams: 


                                 01/22/19 08:37





                                 01/22/19 08:37


Laboratory results interpreted by me: 


                                        











  01/22/19 01/22/19





  08:37 08:37


 


Hgb  11.6 L 


 


Hct  35.7 L 


 


MCV  79 L 


 


MCH  25.7 L 


 


RDW  16.3 H 


 


BUN   21 H


 


Glucose   139 H


 


ALT   16 L














Discharge





- Discharge


Clinical Impression: 


Asthma exacerbation


Qualifiers:


 Asthma severity: mild Asthma persistence: intermittent Qualified Code(s): 

J45.21 - Mild intermittent asthma with (acute) exacerbation





Condition: Stable


Disposition: HOME, SELF-CARE


Instructions:  Asthma (Formerly [facility])


Additional Instructions: 


Your chest XR was okay without any pneumonia or fluid.





Maintain adequate fluid intake


Take meds as directed


tylenol/ibuprofen as needed


Use inhaler as directed/needed


over the counter cold medication as needed for symptoms


Humidified air may help


Wash your hands regularly


Wear a mask when coughing


F/u:  with your PCM in 3-5 days for a recheck





Return to the ED with any fever, worsening pain, chest pain, palpitations, 

syncope, worsening HA, neck pain/stiffness, shortness of breath, wheezing, 

drooling, trouble swallowing/breathing, abdominal pain, n/v/d, rash, or 

worsening/concerning symptoms otherwise.


Prescriptions: 


Prednisone [Deltasone 10 mg Tablet] 10 mg PO DAILY #18 tablet


Forms:  Elevated Blood Pressure


Referrals: 


EDEN CUMMINGS MD [ACTIVE STAFF] - Follow up as needed

## 2019-01-22 NOTE — RADIOLOGY REPORT (SQ)
EXAM DESCRIPTION:  CHEST 2 VIEWS



COMPLETED DATE/TIME:  1/22/2019 8:42 am



REASON FOR STUDY:  asthma



COMPARISON:  12/28/2018



EXAM PARAMETERS:  NUMBER OF VIEWS: two views

TECHNIQUE: Digital Frontal and Lateral radiographic views of the chest acquired.

RADIATION DOSE: NA

LIMITATIONS: none



FINDINGS:  LUNGS AND PLEURA:  Interval resolution of the patchy parenchymal opacities in the right tapan
ng.  Minimal scarring is again noted.  Very small  right pleural effusion has resolved.  No acute pul
monary consolidation in the lungs.  Mild hyperinflation of the lungs and flattening of the diaphragms
.  No pneumothorax.

MEDIASTINUM AND HILAR STRUCTURES: No masses or contour abnormalities.

HEART AND VASCULAR STRUCTURES: Heart normal size.  No evidence for failure.

BONES: No acute findings.

HARDWARE: None in the chest.

OTHER: No other significant finding.



IMPRESSION:  1.  Interval resolution of the patchy opacities in the right lung.  Minimal scarring is 
again noted.

2.  Mild hyperinflation and flattening of the diaphragms, stable findings.  No acute pulmonary consol
idation.

3.  Resolution of the very small right pleural effusion.



TECHNICAL DOCUMENTATION:  JOB ID:  [id number]

 2011 [employer]- All Rights Reserved



Reading location - IP/workstation name: [email or web address]

## 2019-02-09 ENCOUNTER — HOSPITAL ENCOUNTER (EMERGENCY)
Dept: HOSPITAL 62 - ER | Age: 39
Discharge: HOME | End: 2019-02-09
Payer: MEDICARE

## 2019-02-09 VITALS — SYSTOLIC BLOOD PRESSURE: 148 MMHG | DIASTOLIC BLOOD PRESSURE: 98 MMHG

## 2019-02-09 DIAGNOSIS — Z79.899: ICD-10-CM

## 2019-02-09 DIAGNOSIS — J45.901: Primary | ICD-10-CM

## 2019-02-09 DIAGNOSIS — R06.02: ICD-10-CM

## 2019-02-09 DIAGNOSIS — J01.10: ICD-10-CM

## 2019-02-09 DIAGNOSIS — R52: ICD-10-CM

## 2019-02-09 DIAGNOSIS — R09.81: ICD-10-CM

## 2019-02-09 DIAGNOSIS — R05: ICD-10-CM

## 2019-02-09 LAB
A TYPE INFLUENZA AG: NEGATIVE
ADD MANUAL DIFF: NO
ALBUMIN SERPL-MCNC: 4.5 G/DL (ref 3.5–5)
ALP SERPL-CCNC: 61 U/L (ref 38–126)
ALT SERPL-CCNC: 13 U/L (ref 21–72)
ANION GAP SERPL CALC-SCNC: 7 MMOL/L (ref 5–19)
AST SERPL-CCNC: 28 U/L (ref 17–59)
B INFLUENZA AG: NEGATIVE
BASOPHILS # BLD AUTO: 0.1 10^3/UL (ref 0–0.2)
BASOPHILS NFR BLD AUTO: 1.4 % (ref 0–2)
BILIRUB DIRECT SERPL-MCNC: 0.3 MG/DL (ref 0–0.4)
BILIRUB SERPL-MCNC: 0.4 MG/DL (ref 0.2–1.3)
BUN SERPL-MCNC: 24 MG/DL (ref 7–20)
CALCIUM: 8.7 MG/DL (ref 8.4–10.2)
CHLORIDE SERPL-SCNC: 106 MMOL/L (ref 98–107)
CO2 SERPL-SCNC: 31 MMOL/L (ref 22–30)
EOSINOPHIL # BLD AUTO: 0 10^3/UL (ref 0–0.6)
EOSINOPHIL NFR BLD AUTO: 0.8 % (ref 0–6)
ERYTHROCYTE [DISTWIDTH] IN BLOOD BY AUTOMATED COUNT: 16.7 % (ref 11.5–14)
GLUCOSE SERPL-MCNC: 82 MG/DL (ref 75–110)
HCT VFR BLD CALC: 34.1 % (ref 37.9–51)
HGB BLD-MCNC: 11.2 G/DL (ref 13.5–17)
LYMPHOCYTES # BLD AUTO: 1.1 10^3/UL (ref 0.5–4.7)
LYMPHOCYTES NFR BLD AUTO: 26.9 % (ref 13–45)
MCH RBC QN AUTO: 25.5 PG (ref 27–33.4)
MCHC RBC AUTO-ENTMCNC: 32.7 G/DL (ref 32–36)
MCV RBC AUTO: 78 FL (ref 80–97)
MONOCYTES # BLD AUTO: 0.7 10^3/UL (ref 0.1–1.4)
MONOCYTES NFR BLD AUTO: 15.4 % (ref 3–13)
NEUTROPHILS # BLD AUTO: 2.4 10^3/UL (ref 1.7–8.2)
NEUTS SEG NFR BLD AUTO: 55.5 % (ref 42–78)
PLATELET # BLD: 272 10^3/UL (ref 150–450)
POTASSIUM SERPL-SCNC: 4.4 MMOL/L (ref 3.6–5)
PROT SERPL-MCNC: 7.7 G/DL (ref 6.3–8.2)
RBC # BLD AUTO: 4.37 10^6/UL (ref 4.35–5.55)
SODIUM SERPL-SCNC: 144.1 MMOL/L (ref 137–145)
TOTAL CELLS COUNTED % (AUTO): 100 %
WBC # BLD AUTO: 4.2 10^3/UL (ref 4–10.5)

## 2019-02-09 PROCEDURE — 99285 EMERGENCY DEPT VISIT HI MDM: CPT

## 2019-02-09 PROCEDURE — 96365 THER/PROPH/DIAG IV INF INIT: CPT

## 2019-02-09 PROCEDURE — 71046 X-RAY EXAM CHEST 2 VIEWS: CPT

## 2019-02-09 PROCEDURE — 87804 INFLUENZA ASSAY W/OPTIC: CPT

## 2019-02-09 PROCEDURE — 94640 AIRWAY INHALATION TREATMENT: CPT

## 2019-02-09 PROCEDURE — 80053 COMPREHEN METABOLIC PANEL: CPT

## 2019-02-09 PROCEDURE — 96375 TX/PRO/DX INJ NEW DRUG ADDON: CPT

## 2019-02-09 PROCEDURE — 85025 COMPLETE CBC W/AUTO DIFF WBC: CPT

## 2019-02-09 PROCEDURE — 96361 HYDRATE IV INFUSION ADD-ON: CPT

## 2019-02-09 PROCEDURE — 36415 COLL VENOUS BLD VENIPUNCTURE: CPT

## 2019-02-09 RX ADMIN — MAGNESIUM SULFATE IN DEXTROSE SCH MLS/HR: 10 INJECTION, SOLUTION INTRAVENOUS at 16:21

## 2019-02-09 RX ADMIN — MAGNESIUM SULFATE IN DEXTROSE SCH MLS/HR: 10 INJECTION, SOLUTION INTRAVENOUS at 16:45

## 2019-02-09 NOTE — ER DOCUMENT REPORT
ED Medical Screen (RME)





- General


Chief Complaint: Asthma Exacerbation


Stated Complaint: SHORTNESS OF BREATH


Time Seen by Provider: 02/09/19 15:52


Mode of Arrival: Ambulatory


Information source: Patient


Notes: 





38-year-old male with a history of asthma presents emergency department with 

complaints of wheezing and difficulty breathing that has been getting 

progressively worse over the last couple of days.  He states that he is coughing

up thick phlegm.  He has been using his inhalers with minimal relief of 

treatment.  Patient states that he has a history of a right-sided collapsed 

lung.





I have greeted and performed a rapid initial assessment of this patient.  A 

comprehensive ED assessment and evaluation of the patient, analysis of test 

results and completion of the medical decision making process will be conducted 

by additional ED providers.





PHYSICAL EXAMINATION:





GENERAL: Well-appearing, well-nourished and in no acute distress.





HEAD: Atraumatic, normocephalic.





EYES: Pupils equal round extraocular movements intact,  conjunctiva are normal.





ENT: Nares patent





NECK: Normal range of motion





LUNGS: Diffuse wheezing. No respiratory distress. 





Musculoskeletal: Normal range of motion





NEUROLOGICAL:  Normal speech, normal gait. 





PSYCH: Normal mood, normal affect.





SKIN: Warm, Dry, normal turgor, no rashes or lesions noted.


TRAVEL OUTSIDE OF THE [place] IN LAST 30 DAYS: No





- Related Data


Allergies/Adverse Reactions: 


                                        





tramadol Allergy (Verified 02/09/19 15:40)


   











Past Medical History


Pulmonary Medical History: Reports: Hx Asthma, Hx Bronchitis


   Denies: Hx COPD, Hx Pneumonia, Hx Intubation, Hx Respiratory Failure, Hx 

Sleep Apnea, Hx Tuberculosis


Neurological Medical History: Denies: Hx Cerebrovascular Accident, Hx Migraine, 

Hx Seizures


Endocrine Medical History: Denies: Hx Diabetes Mellitus Type 1, Hx Diabetes Meenu

litus Type 2, Hx Graves' Disease, Hx Hyperthyroidism, Hx Hypothyroidism


Renal/ Medical History: Denies: Hx Peritoneal Dialysis


Psychiatric Medical History: Reports: Hx Bipolar Disorder


Infectious Medical History: Reports: Hx MRSA


Past Surgical History: Reports: Hx Abdominal Surgery - umbilical hernia repair, 

Hx Orthopedic Surgery - R clavical; tibia





- Immunizations


Hx Diphtheria, Pertussis, Tetanus Vaccination: Yes


History of Influenza Vaccine for 10/2017 - 3/2018 Season: No





Physical Exam





- Vital signs


Vitals: 





                                        











Temp Pulse Resp BP Pulse Ox


 


 97.6 F   90   26 H  136/92 H  96 


 


 02/09/19 15:43  02/09/19 15:43  02/09/19 15:43  02/09/19 15:43  02/09/19 15:43














Course





- Vital Signs


Vital signs: 





                                        











Temp Pulse Resp BP Pulse Ox


 


 97.6 F   90   26 H  136/92 H  96 


 


 02/09/19 15:43  02/09/19 15:43  02/09/19 15:43  02/09/19 15:43  02/09/19 15:43
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
## 2019-02-09 NOTE — RADIOLOGY REPORT (SQ)
EXAM DESCRIPTION:  CHEST 2 VIEWS



COMPLETED DATE/TIME:  2/9/2019 5:28 pm



REASON FOR STUDY:  cough, wheezing.



COMPARISON:  1/22/2019



TECHNIQUE:  Frontal and lateral radiographic views of the chest acquired.



NUMBER OF VIEWS:  Two view.



LIMITATIONS:  None.



FINDINGS:  LUNGS AND PLEURA: No pneumothorax. No consolidation or pleural effusion.

MEDIASTINUM AND HILAR STRUCTURES: Stable.

HEART AND VASCULAR STRUCTURES: Stable.

BONES: No acute findings.

HARDWARE: None in the chest.

OTHER: No other significant finding.



IMPRESSION:  NO ACUTE FINDINGS.



TECHNICAL DOCUMENTATION:  JOB ID:  [id number]

[id number]

 2011 [employer]- All Rights Reserved



Reading location - IP/workstation name: [email or web address]

## 2019-06-02 ENCOUNTER — HOSPITAL ENCOUNTER (EMERGENCY)
Dept: HOSPITAL 62 - ER | Age: 39
LOS: 1 days | Discharge: LEFT BEFORE DISCHARGE | End: 2019-06-03
Payer: MEDICARE

## 2019-06-02 DIAGNOSIS — F12.10: ICD-10-CM

## 2019-06-02 DIAGNOSIS — J96.01: ICD-10-CM

## 2019-06-02 DIAGNOSIS — R05: ICD-10-CM

## 2019-06-02 DIAGNOSIS — Z88.5: ICD-10-CM

## 2019-06-02 DIAGNOSIS — J45.41: Primary | ICD-10-CM

## 2019-06-02 DIAGNOSIS — Z53.20: ICD-10-CM

## 2019-06-02 PROCEDURE — 96365 THER/PROPH/DIAG IV INF INIT: CPT

## 2019-06-02 PROCEDURE — 96375 TX/PRO/DX INJ NEW DRUG ADDON: CPT

## 2019-06-02 PROCEDURE — 80048 BASIC METABOLIC PNL TOTAL CA: CPT

## 2019-06-02 PROCEDURE — 96361 HYDRATE IV INFUSION ADD-ON: CPT

## 2019-06-02 PROCEDURE — 94640 AIRWAY INHALATION TREATMENT: CPT

## 2019-06-02 PROCEDURE — 85025 COMPLETE CBC W/AUTO DIFF WBC: CPT

## 2019-06-02 PROCEDURE — 71045 X-RAY EXAM CHEST 1 VIEW: CPT

## 2019-06-02 PROCEDURE — 36415 COLL VENOUS BLD VENIPUNCTURE: CPT

## 2019-06-02 PROCEDURE — 82803 BLOOD GASES ANY COMBINATION: CPT

## 2019-06-02 PROCEDURE — 99291 CRITICAL CARE FIRST HOUR: CPT

## 2019-06-03 VITALS — SYSTOLIC BLOOD PRESSURE: 148 MMHG | DIASTOLIC BLOOD PRESSURE: 88 MMHG

## 2019-06-03 LAB
ADD MANUAL DIFF: NO
ANION GAP SERPL CALC-SCNC: 6 MMOL/L (ref 5–19)
BASE EXCESS BLDV CALC-SCNC: 5 MMOL/L
BASOPHILS # BLD AUTO: 0.1 10^3/UL (ref 0–0.2)
BASOPHILS NFR BLD AUTO: 1.4 % (ref 0–2)
BUN SERPL-MCNC: 18 MG/DL (ref 7–20)
CALCIUM: 9.1 MG/DL (ref 8.4–10.2)
CHLORIDE SERPL-SCNC: 103 MMOL/L (ref 98–107)
CO2 SERPL-SCNC: 32 MMOL/L (ref 22–30)
EOSINOPHIL # BLD AUTO: 0.9 10^3/UL (ref 0–0.6)
EOSINOPHIL NFR BLD AUTO: 14.1 % (ref 0–6)
ERYTHROCYTE [DISTWIDTH] IN BLOOD BY AUTOMATED COUNT: 15.8 % (ref 11.5–14)
GLUCOSE SERPL-MCNC: 86 MG/DL (ref 75–110)
HCO3 BLDV-SCNC: 31.4 MMOL/L (ref 20–32)
HCT VFR BLD CALC: 34.8 % (ref 37.9–51)
HGB BLD-MCNC: 11.3 G/DL (ref 13.5–17)
LYMPHOCYTES # BLD AUTO: 1.6 10^3/UL (ref 0.5–4.7)
LYMPHOCYTES NFR BLD AUTO: 24.2 % (ref 13–45)
MCH RBC QN AUTO: 26 PG (ref 27–33.4)
MCHC RBC AUTO-ENTMCNC: 32.4 G/DL (ref 32–36)
MCV RBC AUTO: 80 FL (ref 80–97)
MONOCYTES # BLD AUTO: 0.4 10^3/UL (ref 0.1–1.4)
MONOCYTES NFR BLD AUTO: 6.1 % (ref 3–13)
NEUTROPHILS # BLD AUTO: 3.5 10^3/UL (ref 1.7–8.2)
NEUTS SEG NFR BLD AUTO: 54.2 % (ref 42–78)
PCO2 BLDV: 54.5 MMHG (ref 35–63)
PH BLDV: 7.38 [PH] (ref 7.3–7.42)
PLATELET # BLD: 290 10^3/UL (ref 150–450)
POTASSIUM SERPL-SCNC: 4.3 MMOL/L (ref 3.6–5)
RBC # BLD AUTO: 4.34 10^6/UL (ref 4.35–5.55)
SODIUM SERPL-SCNC: 141.2 MMOL/L (ref 137–145)
TOTAL CELLS COUNTED % (AUTO): 100 %
WBC # BLD AUTO: 6.5 10^3/UL (ref 4–10.5)

## 2019-06-03 RX ADMIN — MAGNESIUM SULFATE IN DEXTROSE SCH MLS/HR: 10 INJECTION, SOLUTION INTRAVENOUS at 00:16

## 2019-06-03 RX ADMIN — MAGNESIUM SULFATE IN DEXTROSE SCH MLS/HR: 10 INJECTION, SOLUTION INTRAVENOUS at 00:25

## 2019-06-03 NOTE — RADIOLOGY REPORT (SQ)
EXAM DESCRIPTION: 



XR CHEST 1 VIEW



COMPLETED DATE/TME:  06/03/2019 00:00



CLINICAL HISTORY: 



38 years, Male, SOB



COMPARISON:

2/9/2019 chest



NUMBER OF VIEWS:

1



TECHNIQUE:

Portable chest



LIMITATIONS:

None.



FINDINGS:



Heart size normal. Old healed right clavicle fracture. Lungs are

clear. No pneumothorax



IMPRESSION:



No acute cardiopulmonary process

 



copyright 2011 [employer]- All Rights Reserved

## 2019-06-03 NOTE — ER DOCUMENT REPORT
ED General





- General


Chief Complaint: Asthma Exacerbation


Stated Complaint: DIFFICULTY BREATHING


Time Seen by Provider: 06/03/19 00:12


Primary Care Provider: 


BABAK FLORES MD [Primary Care Provider] - Follow up as needed


Notes: 





Patient is a 38-year-old male with a past medical history of asthma that has 

required intubation in the past who presents with severe shortness of breath, 

cough, worsening over the last 4 to 5 days.  Patient states that his symptoms 

started gradually, progressively worsened since onset.  States that he feels 

like it is worsened by being outdoors and humidity.  States that he has been out

working in the woods for an extended period of time for the past several days 

and believes this may have triggered his exacerbation.  Has been trying inhalers

at home including Combivent, albuterol, and Advair with no significant 

improvement.  Has not seen his primary doctor regarding today's concerns.  

States this feels similar 20s had severe exacerbations that required 

hospitalizations in the past.  Has not had fever or constitutional symptoms.  

History is otherwise limited secondary to patient's degree of distress at time 

of initial assessment.


TRAVEL OUTSIDE OF THE [place] IN LAST 30 DAYS: No





- Related Data


Allergies/Adverse Reactions: 


                                        





tramadol Allergy (Verified 02/09/19 15:40)


   











Past Medical History





- General


Information source: Patient





- Social History


Smoking Status: Never Smoker


Frequency of alcohol use: Occasional


Drug Abuse: Marijuana


Lives with: Spouse/Significant other


Family History: Hypertension, Other - Unobtainable


Pulmonary Medical History: Reports: Hx Asthma, Hx Bronchitis


   Denies: Hx COPD, Hx Pneumonia, Hx Intubation, Hx Respiratory Failure, Hx 

Sleep Apnea, Hx Tuberculosis


Neurological Medical History: Denies: Hx Cerebrovascular Accident, Hx Migraine, 

Hx Seizures


Endocrine Medical History: Denies: Hx Diabetes Mellitus Type 1, Hx Diabetes 

Mellitus Type 2, Hx Graves' Disease, Hx Hyperthyroidism, Hx Hypothyroidism


Renal/ Medical History: Denies: Hx Peritoneal Dialysis


Psychiatric Medical History: Reports: Hx Bipolar Disorder


Infectious Medical History: Reports: Hx MRSA


Past Surgical History: Reports: Hx Abdominal Surgery - umbilical hernia repair, 

Hx Orthopedic Surgery - R clavical; tibia





- Immunizations


Hx Diphtheria, Pertussis, Tetanus Vaccination: Yes


Hx Pneumococcal Vaccination: 10/01/11





Review of Systems





- Review of Systems


Notes: 





Constitutional: Negative for fever.


HENT: Negative for sore throat.


Eyes: Negative for visual changes.


Cardiovascular: Negative for chest pain.


Respiratory: Positive for shortness of breath and cough


Gastrointestinal: Negative for abdominal pain, vomiting or diarrhea.


Genitourinary: Negative for dysuria.


Musculoskeletal: Negative for back pain.


Skin: Negative for rash.


Neurological: Negative for headaches, weakness or numbness.





10 point ROS negative except as marked above and in HPI.





Physical Exam





- Vital signs


Vitals: 


                                        











Temp Pulse Resp BP Pulse Ox


 


 98.0 F   77   25 H  131/77 H  92 


 


 06/03/19 00:02  06/03/19 00:02  06/03/19 00:02  06/03/19 00:02  06/03/19 00:02











Interpretation: Tachycardic, Hypoxic, Tachypneic


Notes: 





PHYSICAL EXAMINATION:





GENERAL: In moderate to severe respiratory distress.  In the tripod position, 

smells strongly of marijuana





HEAD: Atraumatic, normocephalic.





EYES: Pupils equal round and reactive to light, extraocular movements intact, 

sclera anicteric, conjunctiva are normal.





ENT: nares patent, oropharynx clear without exudates.  Moderately dry mucous 

membranes.





NECK: Normal range of motion, supple without lymphadenopathy





LUNGS: Diminished breath sounds in all lung fields throughout with coarse 

expiratory wheezing throughout.





HEART: Regular tachycardia without murmurs





ABDOMEN: Soft, nontender, normoactive bowel sounds.  No guarding, no rebound.  

No masses appreciated.





EXTREMITIES: Normal range of motion, no pitting or edema.  No cyanosis.





NEUROLOGICAL: No focal neurological deficits. Moves all extremities 

spontaneously and on command.





PSYCH: Moderately anxious





SKIN: Warm, Dry, normal turgor, no rashes or lesions noted.





Course





- Re-evaluation


Re-evalutation: 





06/03/19 00:20


Patient presents in moderate respiratory distress, laboring to speak a full 

sentence and has intercostal and supraclavicular retractions.  Patient has 

coarse expiratory wheezing in all lung fields and diminished air movement 

throughout.  He was immediately started on a continuous albuterol and ipratro

pium nebulizer.  IV magnesium and slight Medrol be administered.  IV fluids were

likewise be administered.  Patient has a history of severe asthma exacerbations 

requiring intubation in the past.  Chest x-ray and labs are pending.  Patient 

will be reassessed every boluses he is high risk for respiratory decompensation.

 He is not having significant improvement with continuous nebulizers will transi

tion to BiPAP.


06/03/19 01:42


Patient has been reassessed on 2 separate occasions.  His work of breathing has 

improved although he continues to be very tight and wheezing in all lung fields.

 No longer tachypneic and able to speak in a full sentence.  The patient has 

declined BiPAP stating that this usually makes it harder for him to breathe.    

Chest x-ray labs unremarkable.


06/03/19 03:15


Patient's work of breathing has improved, wheezing is improved he continues to 

be hypoxic on room air down to 91%.  He continues to have trace wheezing in all 

lung fields and moderate tachypnea.  The patient is declining hospitalization.  

I have emphasized to the patient that despite aggressive amounts of nebulizers, 

magnesium, steroids, he continues to be symptomatic and that I have strongly 

encouraged hospitalization. 





The patient has chosen to leave the facility against medical advice. The relevan

t issues have been reviewed and discussed with the patient and family at the 

bedside. At the time of this assessment there is no indication for involuntary 

commitment. The patient is alert, oriented, and able to express clearly their 

reasoning for not wanting to remain in the emergency department for further 

treatment. The patient is not clinically psychotic, intoxicated, and denies and 

suicidal ideation. 





Differential or suspected diagnoses based on medical screening exam: Status 

asthmaticus





The patient is aware of the concerning diagnoses and acknowledges understanding 

of the reasons for the following recommendations: Hospitalization for ongoing 

treatment





The following recommendations/services were offered and refused: 

Hospitalization, ongoing treatment as an inpatient





The following risks were explained: Progression of asthma exacerbation, 

respiratory distress, need for hospitalization with intubation, death, permanent

disability, loss of function 





Clinical impression: Patient is competent to make decisions regarding the 

medical that is being offered.





- Vital Signs


Vital signs: 


                                        











Temp Pulse Resp BP Pulse Ox


 


 98.0 F   77   11 L  161/90 H  93 


 


 06/03/19 00:02  06/03/19 00:02  06/03/19 02:01  06/03/19 02:01  06/03/19 02:01














- Laboratory


Result Diagrams: 


                                 06/03/19 00:10





                                 06/03/19 00:10


Laboratory results interpreted by me: 


                                        











  06/03/19 06/03/19





  00:10 00:10


 


RBC  4.34 L 


 


Hgb  11.3 L 


 


Hct  34.8 L 


 


MCH  26.0 L 


 


RDW  15.8 H 


 


Eosinophils %  14.1 H 


 


Absolute Eosinophils  0.9 H 


 


Carbon Dioxide   32 H














- Diagnostic Test


Radiology reviewed: Image reviewed, Reports reviewed


Radiology results interpreted by me: 





06/03/19 01:42


Chest x-ray: No acute infiltrate or pneumothorax





Critical Care Note





- Critical Care Note


Total time excluding time spent on procedures (mins): 38


Comments: 





Critical care time spent obtaining history from patient or surrogate, 

development of treatment plan with patient or surrogate, evaluation of patient's

response to treatment, examination of patient, ordering and performing 

treatments and interventions, ordering and review of laboratory studies, re-ev

aluation of patient's condition, ordering and review of radiographic studies and

review of old charts





Discharge





- Discharge


Clinical Impression: 


 Respiratory distress





Asthma exacerbation


Qualifiers:


 Asthma severity: moderate Asthma persistence: persistent Qualified Code(s): 

J45.41 - Moderate persistent asthma with (acute) exacerbation





Acute respiratory failure


Qualifiers:


 Respiratory failure complication: hypoxia Qualified Code(s): J96.01 - Acute res

piratory failure with hypoxia





Condition: Serious


Disposition: AGAINST MEDICAL ADVICE


Additional Instructions: 


Your being discharged today AGAINST MEDICAL ADVICE.  I have strongly advised 

that she should be hospitalized as your work of breathing continues to be 

elevated and you continue to need treatment on an inpatient basis.  Although 

your labs and x-ray are normal today, your oxygen level is unacceptably low.  

After discussing my concerns, you have decided to go home.  Please know that I 

would always be happy to have you back to the emergency department and continue 

to treat you.  You are welcome to return at any time.





You are being started on a course of steroids and need to continue using your 

normal home breathing treatments.  Strongly encourage you to return to the 

emergency department at any time particularly if you begin having worsening of 

your breathing, passing out, have a fever of greater than 100.4 F.


Prescriptions: 


Prednisone [Deltasone 20 mg Tablet] 2 tab PO DAILY 5 Days  tablet


Referrals: 


BABAK FLORES MD [Primary Care Provider] - Follow up tomorrow

## 2019-09-25 ENCOUNTER — HOSPITAL ENCOUNTER (OUTPATIENT)
Dept: HOSPITAL 62 - ER | Age: 39
Setting detail: OBSERVATION
LOS: 1 days | Discharge: LEFT BEFORE DISCHARGE | End: 2019-09-26
Attending: FAMILY MEDICINE | Admitting: FAMILY MEDICINE
Payer: MEDICARE

## 2019-09-25 VITALS — SYSTOLIC BLOOD PRESSURE: 149 MMHG | DIASTOLIC BLOOD PRESSURE: 85 MMHG

## 2019-09-25 DIAGNOSIS — Z79.51: ICD-10-CM

## 2019-09-25 DIAGNOSIS — Z82.49: ICD-10-CM

## 2019-09-25 DIAGNOSIS — J45.41: Primary | ICD-10-CM

## 2019-09-25 DIAGNOSIS — R09.81: ICD-10-CM

## 2019-09-25 LAB
ADD MANUAL DIFF: NO
ALBUMIN SERPL-MCNC: 4.1 G/DL (ref 3.5–5)
ALP SERPL-CCNC: 48 U/L (ref 38–126)
ANION GAP SERPL CALC-SCNC: 6 MMOL/L (ref 5–19)
ARTERIAL BLOOD FIO2: (no result)
ARTERIAL BLOOD H2CO3: 1.64 MMOL/L (ref 1.05–1.35)
ARTERIAL BLOOD HCO3: 30 MMOL/L (ref 20–24)
ARTERIAL BLOOD PCO2: 54.5 MMHG (ref 35–45)
ARTERIAL BLOOD PH: 7.36 (ref 7.35–7.45)
ARTERIAL BLOOD PO2: 58.8 MMHG (ref 80–100)
ARTERIAL BLOOD TOTAL CO2: 31.6 MMOL/L (ref 23–27)
AST SERPL-CCNC: 29 U/L (ref 17–59)
BASE EXCESS BLDA CALC-SCNC: 3.4 MMOL/L
BASOPHILS # BLD AUTO: 0.1 10^3/UL (ref 0–0.2)
BASOPHILS NFR BLD AUTO: 1.4 % (ref 0–2)
BILIRUB DIRECT SERPL-MCNC: 0.2 MG/DL (ref 0–0.4)
BILIRUB SERPL-MCNC: 0.3 MG/DL (ref 0.2–1.3)
BUN SERPL-MCNC: 12 MG/DL (ref 7–20)
CALCIUM: 9.1 MG/DL (ref 8.4–10.2)
CHLORIDE SERPL-SCNC: 102 MMOL/L (ref 98–107)
CO2 SERPL-SCNC: 30 MMOL/L (ref 22–30)
EOSINOPHIL # BLD AUTO: 0.9 10^3/UL (ref 0–0.6)
EOSINOPHIL NFR BLD AUTO: 16.1 % (ref 0–6)
ERYTHROCYTE [DISTWIDTH] IN BLOOD BY AUTOMATED COUNT: 16.5 % (ref 11.5–14)
GLUCOSE SERPL-MCNC: 131 MG/DL (ref 75–110)
HCT VFR BLD CALC: 35 % (ref 37.9–51)
HGB BLD-MCNC: 11.5 G/DL (ref 13.5–17)
LYMPHOCYTES # BLD AUTO: 1.5 10^3/UL (ref 0.5–4.7)
LYMPHOCYTES NFR BLD AUTO: 27.2 % (ref 13–45)
MCH RBC QN AUTO: 26.5 PG (ref 27–33.4)
MCHC RBC AUTO-ENTMCNC: 32.8 G/DL (ref 32–36)
MCV RBC AUTO: 81 FL (ref 80–97)
MONOCYTES # BLD AUTO: 0.4 10^3/UL (ref 0.1–1.4)
MONOCYTES NFR BLD AUTO: 7.8 % (ref 3–13)
NEUTROPHILS # BLD AUTO: 2.7 10^3/UL (ref 1.7–8.2)
NEUTS SEG NFR BLD AUTO: 47.5 % (ref 42–78)
PLATELET # BLD: 268 10^3/UL (ref 150–450)
POTASSIUM SERPL-SCNC: 4 MMOL/L (ref 3.6–5)
PROT SERPL-MCNC: 6.7 G/DL (ref 6.3–8.2)
RBC # BLD AUTO: 4.34 10^6/UL (ref 4.35–5.55)
SAO2 % BLDA: 89 % (ref 94–98)
TOTAL CELLS COUNTED % (AUTO): 100 %
WBC # BLD AUTO: 5.7 10^3/UL (ref 4–10.5)

## 2019-09-25 PROCEDURE — 71045 X-RAY EXAM CHEST 1 VIEW: CPT

## 2019-09-25 PROCEDURE — G0378 HOSPITAL OBSERVATION PER HR: HCPCS

## 2019-09-25 PROCEDURE — 96365 THER/PROPH/DIAG IV INF INIT: CPT

## 2019-09-25 PROCEDURE — 80053 COMPREHEN METABOLIC PANEL: CPT

## 2019-09-25 PROCEDURE — 99291 CRITICAL CARE FIRST HOUR: CPT

## 2019-09-25 PROCEDURE — 94640 AIRWAY INHALATION TREATMENT: CPT

## 2019-09-25 PROCEDURE — 85025 COMPLETE CBC W/AUTO DIFF WBC: CPT

## 2019-09-25 PROCEDURE — 96375 TX/PRO/DX INJ NEW DRUG ADDON: CPT

## 2019-09-25 PROCEDURE — 82803 BLOOD GASES ANY COMBINATION: CPT

## 2019-09-25 PROCEDURE — 36415 COLL VENOUS BLD VENIPUNCTURE: CPT

## 2019-09-25 RX ADMIN — IPRATROPIUM BROMIDE AND ALBUTEROL SULFATE SCH ML: 2.5; .5 SOLUTION RESPIRATORY (INHALATION) at 17:15

## 2019-09-25 RX ADMIN — IPRATROPIUM BROMIDE AND ALBUTEROL SULFATE ONE ML: 2.5; .5 SOLUTION RESPIRATORY (INHALATION) at 17:09

## 2019-09-25 RX ADMIN — IPRATROPIUM BROMIDE AND ALBUTEROL SULFATE SCH ML: 2.5; .5 SOLUTION RESPIRATORY (INHALATION) at 17:09

## 2019-09-25 RX ADMIN — IPRATROPIUM BROMIDE AND ALBUTEROL SULFATE SCH ML: 2.5; .5 SOLUTION RESPIRATORY (INHALATION) at 17:29

## 2019-09-25 RX ADMIN — IPRATROPIUM BROMIDE AND ALBUTEROL SULFATE SCH ML: 2.5; .5 SOLUTION RESPIRATORY (INHALATION) at 19:40

## 2019-09-25 RX ADMIN — MAGNESIUM SULFATE IN DEXTROSE SCH MLS/HR: 10 INJECTION, SOLUTION INTRAVENOUS at 17:09

## 2019-09-25 RX ADMIN — MAGNESIUM SULFATE IN DEXTROSE SCH MLS/HR: 10 INJECTION, SOLUTION INTRAVENOUS at 17:27

## 2019-09-25 RX ADMIN — IPRATROPIUM BROMIDE AND ALBUTEROL SULFATE ONE: 2.5; .5 SOLUTION RESPIRATORY (INHALATION) at 17:30

## 2019-09-25 NOTE — PDOC H&P
History of Present Illness


Admission Date/PCP: 


  09/25/19 18:39





  BABAK FLORES MD





History of Present Illness: 


CHRISTY JEFFERSON is a 39 year old male with a history of asthma who does not 

take his Advair every day he says he uses his albuterol inhaler very frequently 

who comes in today with an asthma exacerbation.  He said he was in the 

[facility] and all of a sudden he had sudden onset shortness of breath.  He does

not know exactly what triggered it.  He said he uses asthma inhaler and took 

several puffs off of it and started to feel little bit better.  He said he then 

went to a friend's house and try to walk to the front door and got really short 

of breath and then had his girlfriend bring him to the ER.  He was apparently 

pretty short of breath whenever he first came in and was given some steroids and

some duo nebs and some magnesium and since that has settled down some.  Right 

now he is a little jittery from all the nebs.  He is being admitted for 

treatment of his asthma exacerbation.








Past Medical History


Pulmonary Medical History: Reports: Asthma, Bronchitis


   Denies: Chronic Obstructive Pulmonary Disease (COPD), Intubation, Pneumonia, 

Respiratory Failure, Sleep Apnea, Tuberculosis


Neurological Medical History: 


   Denies: Migraine, Seizures


Endocrine Medical History: 


   Denies: Diabetes Mellitus Type 1, Diabetes Mellitus Type 2, Hyperthyroidism, 

Hypothyroidism


Psychiatric Medical History: Reports: Bipolar Disorder


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





Past Surgical History


Past Surgical History: Reports: Orthopedic Surgery - R clavical; tibia





Social History


Smoking Status: Unknown if Ever Smoked


Frequency of Alcohol Use: Occasional


Hx Recreational Drug Use: Yes


Drugs: Cocaine, Marijuana


Hx Prescription Drug Abuse: No





Family History


Family History: Hypertension, Other - Unobtainable


Parental Family History Reviewed: Yes - Hypertension


Children Family History Reviewed: NA


Sibling(s) Family History Reviewed.: Yes - Nothing known





Medication/Allergy


Home Medications: 








Albuterol Sulfate [Ventolin Hfa] 2 puff IH Q6HP PRN 10/16/18 


Cetirizine HCl [Zyrtec] 10 mg PO DAILY 10/16/18 


Fluticasone/Salmeterol [Advair 250-50 Diskus 28 dose] 1 puff IH Q12 10/16/18 


Ipratropium/Albuterol Sulfate [Combivent Respimat Inhal Spray] 1 puff IH QID 

10/16/18 


Montelukast Sodium [Singulair 10 mg Tablet] 10 mg PO QHS 10/16/18 


Tiotropium Bromide [Spiriva Respimat] 2 puff IH DAILY 10/16/18 


Hydromorphone HCl [Dilaudid 2 Mg Tablet] 2 mg PO Q6H PRN #20 tablet 12/08/18 


Prednisone [Deltasone 10 mg Tablet] 10 mg PO DAILY #18 tablet 01/22/19 


Albuterol Sulfate [Proair HFA Inhalation Aerosol 8.5 gm MDI] 2 puff IH Q4H PRN 

#1 mdi 02/09/19 


Albuterol Sulfate [Ventolin 0.083% Neb 2.5 mg/3 mL Ampul] 1 vial NEB Q4 #60 vial

02/09/19 


Amox Tr/Potassium Clavulanate [Augmentin 875-125 mg Tablet] 1 tab PO BID #20 

tablet 02/09/19 


Benzonatate [Tessalon Perles 100 mg Capsule] 100 mg PO Q8HP PRN #40 capsule 

02/09/19 


Prednisone [Deltasone 20 mg Tablet] 3 tab PO DAILY 5 Days  tablet 02/09/19 


Prednisone [Deltasone 20 mg Tablet] 2 tab PO DAILY 5 Days  tablet 06/03/19 








Allergies/Adverse Reactions: 


                                        





tramadol Allergy (Verified 02/09/19 15:40)


   











Review of Systems


All systems: reviewed and no additional remarkable complaints except as stated -

All systems reviewed and were negative except as noted in the HPI





Physical Exam


Vital Signs: 


                                        











Temp Pulse Resp BP Pulse Ox


 


 98.1 F      17   157/98 H  95 


 


 09/25/19 17:24     09/25/19 18:01  09/25/19 18:01  09/25/19 18:01








                                 Intake & Output











 09/24/19 09/25/19 09/26/19





 06:59 06:59 06:59


 


Intake Total   100


 


Balance   100


 


Weight   86.183 kg











General appearance: PRESENT: no acute distress, cooperative, disheveled


Head exam: PRESENT: atraumatic, normocephalic


Eye exam: PRESENT: EOMI, PERRLA.  ABSENT: conjunctival injection, nystagmus, 

scleral icterus


Ear exam: PRESENT: normal external ear exam


Mouth exam: PRESENT: moist, neck supple


Throat exam: ABSENT: post pharyngeal erythema


Neck exam: PRESENT: full ROM.  ABSENT: carotid bruit, JVD, lymphadenopathy, 

meningismus, tenderness, thyromegaly


Respiratory exam: PRESENT: prolonged expiratory phas, symmetrical, wheezes.  

ABSENT: accessory muscle use, chest wall tenderness, crackles, rhonchi, 

tachypnea, unlabored


Cardiovascular exam: PRESENT: RRR, +S1, +S2


Pulses: PRESENT: normal carotid pulses


Vascular exam: PRESENT: normal capillary refill


GI/Abdominal exam: PRESENT: normal bowel sounds, soft.  ABSENT: distended, 

guarding, rebound, tenderness


Extremities exam: ABSENT: clubbing, pedal edema


Musculoskeletal exam: PRESENT: normal inspection.  ABSENT: deformity


Neurological exam: PRESENT: alert, awake, oriented to person, oriented to place,

oriented to time, oriented to situation, CN II-XII grossly intact.  ABSENT: 

motor sensory deficit


Psychiatric exam: PRESENT: appropriate affect, normal mood


Skin exam: PRESENT: dry, warm





Results


Laboratory Results: 


                                        





                                 09/25/19 17:36 





                                 09/25/19 17:36 





                                        











  09/25/19 09/25/19 09/25/19





  17:36 17:36 17:45


 


WBC  5.7  


 


RBC  4.34 L  


 


Hgb  11.5 L  


 


Hct  35.0 L  


 


MCV  81  


 


MCH  26.5 L  


 


MCHC  32.8  


 


RDW  16.5 H  


 


Plt Count  268  


 


Seg Neutrophils %  47.5  


 


Carbonic Acid    1.64 H


 


HCO3/H2CO3 Ratio    18:1


 


ABG pH    7.36


 


ABG pCO2    54.5 H


 


ABG pO2    58.8 L


 


ABG HCO3    30.0 H


 


ABG O2 Saturation    89.0 L


 


ABG Base Excess    3.4


 


FiO2    21%


 


Sodium   137.9 


 


Potassium   4.0 


 


Chloride   102 


 


Carbon Dioxide   30 


 


Anion Gap   6 


 


BUN   12 


 


Creatinine   0.85 


 


Est GFR ( Amer)   > 60 


 


Glucose   131 H 


 


Calcium   9.1 


 


Total Bilirubin   0.3 


 


AST   29 


 


Alkaline Phosphatase   48 


 


Total Protein   6.7 


 


Albumin   4.1 











Impressions: 


                                        





Chest X-Ray  09/25/19 17:13


IMPRESSION:  NO ACUTE RADIOGRAPHIC FINDING IN THE CHEST.


 














Assessment and Plan





- Diagnosis


(1) Asthma exacerbation


Qualifiers: 


   Asthma severity: moderate   Asthma persistence: persistent   Qualified 

Code(s): J45.41 - Moderate persistent asthma with (acute) exacerbation   


Is this a current diagnosis for this admission?: Yes   


Plan: 


We will keep him on some IV steroids and some scheduled nebulizer treatments.  

He does not appear to need supplemental O2 at this time.  Talk to him about 

taking his Advair on regular basis so his doctor can know if his treatment is 

effective or not.  I also discouraged him from smoking marijuana as this is 

definitely not going to help his asthma.








- Time


Time Spent with patient: 35 or more minutes

## 2019-09-25 NOTE — ER DOCUMENT REPORT
ED Medical Screen (RME)





- General


Chief Complaint: Respiratory Distress


Stated Complaint: ASTHMA ATTACK


Time Seen by Provider: 09/25/19 17:08


Primary Care Provider: 


BABAK FLORES MD [Primary Care Provider] - Follow up as needed


Mode of Arrival: Wheelchair


Information source: Patient


Notes: 





39-year-old male with history of asthma history of intubation presents emergency

department reports that he was at the [employer] shop and all of a sudden he was hit

with a asthma attack.  Reports he used his inhaler without relief of symptoms.  

Patient is struggling respiratory rate tachypneic O2 sat 93%.











I have greeted and performed a rapid initial assessment of this patient.  A 

comprehensive ED assessment and evaluation of the patient, analysis of test 

results and completion of the medical decision making process will be conducted 

by additional ED providers.


Dictation of this chart was performed using voice recognition software; 

therefore, there may be some unintended grammatical errors.


TRAVEL OUTSIDE OF THE [place] IN LAST 30 DAYS: No





- Related Data


Allergies/Adverse Reactions: 


                                        





tramadol Allergy (Verified 02/09/19 15:40)


   











Past Medical History


Pulmonary Medical History: Reports: Hx Asthma, Hx Bronchitis


   Denies: Hx COPD, Hx Pneumonia, Hx Intubation, Hx Respiratory Failure, Hx 

Sleep Apnea, Hx Tuberculosis


Neurological Medical History: Denies: Hx Cerebrovascular Accident, Hx Migraine, 

Hx Seizures


Endocrine Medical History: Denies: Hx Diabetes Mellitus Type 1, Hx Diabetes 

Mellitus Type 2, Hx Graves' Disease, Hx Hyperthyroidism, Hx Hypothyroidism


Renal/ Medical History: Denies: Hx Peritoneal Dialysis


Psychiatric Medical History: Reports: Hx Bipolar Disorder


Infectious Medical History: Reports: Hx MRSA


Past Surgical History: Reports: Hx Abdominal Surgery - umbilical hernia repair, 

Hx Orthopedic Surgery - R clavical; tibia





- Immunizations


Hx Diphtheria, Pertussis, Tetanus Vaccination: Yes


History of Influenza Vaccine for 10/2017 - 3/2018 Season: No





Doctor's Discharge





- Discharge


Referrals: 


BABAK FLORES MD [Primary Care Provider] - Follow up as needed

## 2019-09-25 NOTE — ER DOCUMENT REPORT
ED Respiratory Problem





- General


Chief Complaint: Respiratory Distress


Stated Complaint: ASTHMA ATTACK


Time Seen by Provider: 09/25/19 17:08


Primary Care Provider: 


BABAK FLORES MD [Primary Care Provider] - Follow up as needed


Mode of Arrival: Wheelchair


Notes: 





HPI: Patient is a 39-year-old male with a history of severe asthma requiring 

intubation in the past, who presents today with the onset around 2 to 3 days of 

some mostly nasal congestion with some wheezing today while getting a haircut 

prior to arrival.  He did take his albuterol rescue inhaler.  Patient denies any

chest pain, fevers, calf pain or leg swelling.  No recent trips or travel.








ROS:  See HPI


All other review of systems reviewed and otherwise negative





Reviewed vital signs and nursing note as charted by RN.





PHYSICAL EXAM: 





CONSTITUTIONAL: Alert and oriented and responds appropriately to questions. 

Well-appearing; well-nourished





HEAD: Normocephalic; atraumatic





EYES: PERRL; Conjunctivae clear, sclerae non-icteric





ENT: Normal nose; bilateral nonpurulent nasal rhinorrhea; moist mucous 

membranes; pharynx without lesions noted





NECK: Supple without meningismus; non-tender; no cervical lymphadenopathy, no 

masses





CARD: Slightly tachycardic and regular; no murmurs; symmetric distal pulses





RESP: Patient has obvious tachypnea.  Wheezes throughout on inspiration and 

expiration.  No rhonchi appreciated





ABD/GI: Normal bowel sounds; non-distended; soft, non-tender





BACK: The back appears normal and is non-tender to palpation





EXT: Normal ROM in all joints; non-tender to palpation; no edema





SKIN: No acute lesions noted





NEURO: CN 2-12 intact; 5/5 bilateral upper and lower extremity strength with se

nsation intact to light touch





PSYCH: The patient's mood and manner are appropriate. Grooming and personal 

hygiene are appropriate.


TRAVEL OUTSIDE OF THE [place] IN LAST 30 DAYS: No





- Related Data


Allergies/Adverse Reactions: 


                                        





tramadol Allergy (Verified 02/09/19 15:40)


   











Past Medical History





- General


Information source: Patient





- Social History


Smoking Status: Unknown if Ever Smoked


Family History: Hypertension, Other - Unobtainable


Pulmonary Medical History: Reports: Hx Asthma, Hx Bronchitis


   Denies: Hx COPD, Hx Pneumonia, Hx Intubation, Hx Respiratory Failure, Hx 

Sleep Apnea, Hx Tuberculosis


Neurological Medical History: Denies: Hx Cerebrovascular Accident, Hx Migraine, 

Hx Seizures


Endocrine Medical History: Denies: Hx Diabetes Mellitus Type 1, Hx Diabetes 

Mellitus Type 2, Hx Graves' Disease, Hx Hyperthyroidism, Hx Hypothyroidism


Renal/ Medical History: Denies: Hx Peritoneal Dialysis


Psychiatric Medical History: Reports: Hx Bipolar Disorder


Infectious Medical History: Reports: Hx MRSA


Past Surgical History: Reports: Hx Abdominal Surgery - umbilical hernia repair, 

Hx Orthopedic Surgery - R clavical; tibia





- Immunizations


Hx Diphtheria, Pertussis, Tetanus Vaccination: Yes


Hx Pneumococcal Vaccination: 10/01/11





Physical Exam





- Vital signs


Vitals: 


                                        











Temp Resp BP Pulse Ox


 


 98.1 F   22 H  129/88 H  95 


 


 09/25/19 17:24  09/25/19 17:24  09/25/19 17:24  09/25/19 17:24














Course





- Re-evaluation


Re-evalutation: 





09/25/19 17:17


Given the history and physical, we placed the patient on the monitor, provide st

eroids, magnesium, duo nebulizers, ABG, and will obtain a portable x-ray of the 

chest.  Patient has obvious tachypnea with wheezing bilaterally.  I do believe 

DVT/PE to be unlikely.  We will reassess the patient's respiratory status.





09/25/19 17:41


Patient's breathing and wheezing has improved.





09/25/19 18:16


Wheezing has improved.   Given the patient's history of severe asthma 

exacerbations with still some mild wheezing bilaterally, I do believe that the 

patient may benefit from serial nebulizers overnight as the steroids have time 

to kick in and become more effective.





- Vital Signs


Vital signs: 


                                        











Temp Pulse Resp BP Pulse Ox


 


 98.1 F      22 H  129/88 H  95 


 


 09/25/19 17:24     09/25/19 17:24  09/25/19 17:24  09/25/19 17:24














- Laboratory


Result Diagrams: 


                                 09/25/19 17:36





                                 09/25/19 17:36


Laboratory results interpreted by me: 


                                        











  09/25/19 09/25/19 09/25/19





  17:36 17:36 17:45


 


RBC  4.34 L  


 


Hgb  11.5 L  


 


Hct  35.0 L  


 


MCH  26.5 L  


 


RDW  16.5 H  


 


Eos % (Auto)  16.1 H  


 


Absolute Eos (auto)  0.9 H  


 


Carbonic Acid    1.64 H


 


ABG pCO2    54.5 H


 


ABG pO2    58.8 L


 


ABG HCO3    30.0 H


 


ABG Total CO2    31.6 H


 


ABG O2 Saturation    89.0 L


 


Glucose   131 H 














Critical Care Note





- Critical Care Note


Total time excluding time spent on procedures (mins): 40





Discharge





- Discharge


Clinical Impression: 


Asthma exacerbation attacks


Qualifiers:


 Asthma severity: severe Asthma persistence: unspecified Qualified Code(s): 

J45.901 - Unspecified asthma with (acute) exacerbation





Condition: Fair


Disposition: ADMITTED AS OBSERVATION


Admitting Provider: Adelfo (Hospitalist)


Unit Admitted: Telemetry


Referrals: 


BABAK FLORES MD [Primary Care Provider] - Follow up as needed

## 2019-09-25 NOTE — RADIOLOGY REPORT (SQ)
EXAM DESCRIPTION:  CHEST SINGLE VIEW



COMPLETED DATE/TIME:  9/25/2019 5:33 pm



REASON FOR STUDY:  tr2/wheezing



COMPARISON:  6/3/2019.



EXAM PARAMETERS:  NUMBER OF VIEWS: One view.

TECHNIQUE: Single frontal radiographic view of the chest acquired.

RADIATION DOSE: NA

LIMITATIONS: None.



FINDINGS:  LUNGS AND PLEURA: No opacities, masses or pneumothorax. No pleural effusion.

MEDIASTINUM AND HILAR STRUCTURES: No masses.  Contour normal.

HEART AND VASCULAR STRUCTURES: Heart normal in size.  Normal vasculature.

BONES: No acute findings.  Old fracture of the right clavicle.

HARDWARE: None in the chest.

OTHER: No other significant finding.



IMPRESSION:  NO ACUTE RADIOGRAPHIC FINDING IN THE CHEST.



TECHNICAL DOCUMENTATION:  JOB ID:  [id number]

 2011 [employer]- All Rights Reserved



Reading location - IP/workstation name: [email or web address]

## 2019-09-26 RX ADMIN — IPRATROPIUM BROMIDE AND ALBUTEROL SULFATE SCH ML: 2.5; .5 SOLUTION RESPIRATORY (INHALATION) at 01:54

## 2019-10-09 NOTE — LEFT AGAINST MEDICAL ADVICE
Against Medical Advice


Admission Date/Time: 09/25/19 18:39





 Primary Care Provider: BABAK FLORES MD








Date of Patient Emigration: 09/26/19





- Diagnosis:


(1) Asthma exacerbation


Is this a current diagnosis for this admission?: Yes   





- Summary:


Summary: 


Please see Admission and Progress Notes as well.





CHRISTY JEFFERSON is a 39 M, who LEFT AGAINST MEDICAL ADVICE.





The Patient was admitted on 09/25/19 18:39.

## 2019-10-25 ENCOUNTER — HOSPITAL ENCOUNTER (EMERGENCY)
Dept: HOSPITAL 62 - ER | Age: 39
Discharge: HOME | End: 2019-10-25
Payer: MEDICARE

## 2019-10-25 VITALS — SYSTOLIC BLOOD PRESSURE: 129 MMHG | DIASTOLIC BLOOD PRESSURE: 82 MMHG

## 2019-10-25 DIAGNOSIS — Z87.01: ICD-10-CM

## 2019-10-25 DIAGNOSIS — Z88.5: ICD-10-CM

## 2019-10-25 DIAGNOSIS — F12.10: ICD-10-CM

## 2019-10-25 DIAGNOSIS — J45.901: Primary | ICD-10-CM

## 2019-10-25 DIAGNOSIS — G89.29: ICD-10-CM

## 2019-10-25 DIAGNOSIS — M54.5: ICD-10-CM

## 2019-10-25 DIAGNOSIS — R05: ICD-10-CM

## 2019-10-25 DIAGNOSIS — F17.200: ICD-10-CM

## 2019-10-25 LAB
A TYPE INFLUENZA AG: NEGATIVE
ADD MANUAL DIFF: NO
ALBUMIN SERPL-MCNC: 3.5 G/DL (ref 3.5–5)
ALP SERPL-CCNC: 62 U/L (ref 38–126)
ANION GAP SERPL CALC-SCNC: 8 MMOL/L (ref 5–19)
AST SERPL-CCNC: 23 U/L (ref 17–59)
B INFLUENZA AG: NEGATIVE
BASE EXCESS BLDV CALC-SCNC: 4.3 MMOL/L
BASOPHILS # BLD AUTO: 0 10^3/UL (ref 0–0.2)
BASOPHILS NFR BLD AUTO: 0.5 % (ref 0–2)
BILIRUB DIRECT SERPL-MCNC: 0.1 MG/DL (ref 0–0.4)
BILIRUB SERPL-MCNC: 0.2 MG/DL (ref 0.2–1.3)
BUN SERPL-MCNC: 16 MG/DL (ref 7–20)
CALCIUM: 8.6 MG/DL (ref 8.4–10.2)
CHLORIDE SERPL-SCNC: 108 MMOL/L (ref 98–107)
CO2 SERPL-SCNC: 29 MMOL/L (ref 22–30)
EOSINOPHIL # BLD AUTO: 0.5 10^3/UL (ref 0–0.6)
EOSINOPHIL NFR BLD AUTO: 8.2 % (ref 0–6)
ERYTHROCYTE [DISTWIDTH] IN BLOOD BY AUTOMATED COUNT: 15.6 % (ref 11.5–14)
GLUCOSE SERPL-MCNC: 110 MG/DL (ref 75–110)
HCO3 BLDV-SCNC: 30.3 MMOL/L (ref 20–32)
HCT VFR BLD CALC: 35.2 % (ref 37.9–51)
HGB BLD-MCNC: 11.4 G/DL (ref 13.5–17)
LYMPHOCYTES # BLD AUTO: 0.7 10^3/UL (ref 0.5–4.7)
LYMPHOCYTES NFR BLD AUTO: 10.7 % (ref 13–45)
MCH RBC QN AUTO: 26 PG (ref 27–33.4)
MCHC RBC AUTO-ENTMCNC: 32.3 G/DL (ref 32–36)
MCV RBC AUTO: 81 FL (ref 80–97)
MONOCYTES # BLD AUTO: 0.5 10^3/UL (ref 0.1–1.4)
MONOCYTES NFR BLD AUTO: 7.9 % (ref 3–13)
NEUTROPHILS # BLD AUTO: 4.8 10^3/UL (ref 1.7–8.2)
NEUTS SEG NFR BLD AUTO: 72.7 % (ref 42–78)
PCO2 BLDV: 51.3 MMHG (ref 35–63)
PH BLDV: 7.39 [PH] (ref 7.3–7.42)
PLATELET # BLD: 281 10^3/UL (ref 150–450)
POTASSIUM SERPL-SCNC: 3.8 MMOL/L (ref 3.6–5)
PROT SERPL-MCNC: 6.3 G/DL (ref 6.3–8.2)
RBC # BLD AUTO: 4.36 10^6/UL (ref 4.35–5.55)
TOTAL CELLS COUNTED % (AUTO): 100 %
WBC # BLD AUTO: 6.6 10^3/UL (ref 4–10.5)

## 2019-10-25 PROCEDURE — 36415 COLL VENOUS BLD VENIPUNCTURE: CPT

## 2019-10-25 PROCEDURE — 80053 COMPREHEN METABOLIC PANEL: CPT

## 2019-10-25 PROCEDURE — 94640 AIRWAY INHALATION TREATMENT: CPT

## 2019-10-25 PROCEDURE — 87804 INFLUENZA ASSAY W/OPTIC: CPT

## 2019-10-25 PROCEDURE — 85025 COMPLETE CBC W/AUTO DIFF WBC: CPT

## 2019-10-25 PROCEDURE — 87040 BLOOD CULTURE FOR BACTERIA: CPT

## 2019-10-25 PROCEDURE — 93005 ELECTROCARDIOGRAM TRACING: CPT

## 2019-10-25 PROCEDURE — 82803 BLOOD GASES ANY COMBINATION: CPT

## 2019-10-25 PROCEDURE — 99285 EMERGENCY DEPT VISIT HI MDM: CPT

## 2019-10-25 PROCEDURE — 71045 X-RAY EXAM CHEST 1 VIEW: CPT

## 2019-10-25 PROCEDURE — 93010 ELECTROCARDIOGRAM REPORT: CPT

## 2019-10-25 NOTE — RADIOLOGY REPORT (SQ)
CLINICAL HISTORY:  difficulty breathing, recent fever 



COMPARISON: September 25, 2019.



TECHNIQUE: XR CHEST 1 VIEW 10/25/2019 4:53 AM CDT



FINDINGS: 



Cardiac silhouette is normal in size. Lungs are clear without

consolidation, atelectasis, mass or edema. There is no pleural

effusion. There is no pneumothorax. There are no acute osseous

findings.



IMPRESSION: 



Clear lungs.

## 2019-10-25 NOTE — ER DOCUMENT REPORT
ED General





- General


Chief Complaint: Shortness Of Breath


Stated Complaint: DIFFICULTY BREATHING


Time Seen by Provider: 10/25/19 04:50


Primary Care Provider: 


BABAK FLORES MD [Primary Care Provider] - Follow up as needed


TRAVEL OUTSIDE OF THE [place] IN LAST 30 DAYS: No





- HPI


Notes: 





Patient is a 39-year-old male with a history of asthma who presents to the 

emergency department for evaluation of difficulty breathing.  He states his 

symptoms been present for about 4 days.  They seem to be getting progressively 

worse.  He states he felt fevered yesterday.  He denies any nausea or vomiting. 

He has had a nonproductive cough.  Eating and drinking normally.  Taking his 

medications as prescribed.  He does continue to smoke.  He states he does not 

smoke cigarettes daily, but states he does smoke marijuana daily.  He has some 

chronic low back pain that seems to be exacerbated at this time, but otherwise 

denies any acute complaints or concerns.





- Related Data


Allergies/Adverse Reactions: 


                                        





tramadol Allergy (Verified 02/09/19 15:40)


   








Home Medications: Was reviewed, please see notes





Past Medical History





- General


Information source: Patient





- Social History


Smoking Status: Current Every Day Smoker


Drug Abuse: Marijuana


Family History: Hypertension, Other - Unobtainable


Patient has suicidal ideation: No


Patient has homicidal ideation: No


Pulmonary Medical History: Reports: Hx Asthma, Hx Bronchitis, Hx Pneumonia


   Denies: Hx COPD, Hx Intubation, Hx Respiratory Failure, Hx Sleep Apnea, Hx 

Tuberculosis


Neurological Medical History: Denies: Hx Cerebrovascular Accident, Hx Migraine, 

Hx Seizures


Endocrine Medical History: Denies: Hx Diabetes Mellitus Type 1, Hx Diabetes 

Mellitus Type 2, Hx Graves' Disease, Hx Hyperthyroidism, Hx Hypothyroidism


Renal/ Medical History: Denies: Hx Peritoneal Dialysis


Psychiatric Medical History: Reports: Hx Bipolar Disorder


Infectious Medical History: Reports: Hx MRSA


Past Surgical History: Reports: Hx Abdominal Surgery - umbilical hernia repair, 

Hx Orthopedic Surgery - R clavical; tibia





- Immunizations


Hx Diphtheria, Pertussis, Tetanus Vaccination: Yes


Hx Pneumococcal Vaccination: 10/01/11





Review of Systems





- Review of Systems


Constitutional: See HPI


EENT: No symptoms reported


Cardiovascular: No symptoms reported


Respiratory: See HPI


Gastrointestinal: No symptoms reported


Genitourinary: No symptoms reported


Musculoskeletal: No symptoms reported


Skin: No symptoms reported


Neurological/Psychological: No symptoms reported





Physical Exam





- Vital signs


Vitals: 


                                        











Pulse Ox


 


 87 L


 


 10/25/19 04:37














- Notes


Notes: 





Vital signs reviewed, please refer to chart. Head is normocephalic, atraumatic. 

Pupils equal round, reactive to light.  Neck is supple without meningismus.  

Heart is regular rate and rhythm.  Lungs reveal scant inspiratory and expiratory

wheezes throughout.  Abdomen is soft, nontender, normoactive bowel sounds throu

ghout.  Extremities without cyanosis, clubbing. Posterior calves are nontender. 

Peripheral pulses are equal.  Skin is warm and dry.  Patient is awake, alert, 

neurological exam is nonfocal.





Course





- Re-evaluation


Re-evalutation: 





10/25/19 07:20


Patient presents emergency department for evaluation of exacerbation of asthma. 

He is advised he needs to quit smoking.  He received multiple treatments in 

route, further treatment here.  He is feeling significantly improved.  At rest 

he is 96% on room air.  He still has very scant wheezes, but they have improved 

greatly.  I will send him home with steroids and refills for his DuoNeb 

prescription.  He is also given an albuterol inhaler here to go.  He is to 

follow-up with primary care.  He states he plans on following up with his 

primary care provider today to discuss possible prescription for Chantix.  He is

encouraged to do so.  He is to return to the ED with worsening or new concerning

symptoms of any sort.





- Vital Signs


Vital signs: 


                                        











Temp Pulse Resp BP Pulse Ox


 


 98.4 F      14   124/84   95 


 


 10/25/19 06:32     10/25/19 06:32  10/25/19 06:32  10/25/19 06:32














- Laboratory


Result Diagrams: 


                                 10/25/19 05:27





                                 10/25/19 05:27


Laboratory results interpreted by me: 


                                        











  10/25/19 10/25/19





  05:27 05:27


 


Hgb  11.4 L 


 


Hct  35.2 L 


 


MCH  26.0 L 


 


RDW  15.6 H 


 


Lymph % (Auto)  10.7 L 


 


Eos % (Auto)  8.2 H 


 


Chloride   108 H














- Diagnostic Test


Radiology reviewed: Reports reviewed


Radiology results interpreted by me: 





10/25/19 06:19





                                        





Chest X-Ray  10/25/19 04:53


IMPRESSION: 


 


Clear lungs.


 














Discharge





- Discharge


Clinical Impression: 


 Asthma exacerbation





Condition: Stable


Disposition: HOME, SELF-CARE


Instructions:  Asthma ([facility])


Additional Instructions: 


Please try to quit smoking.  Take all the prednisone as prescribed, starting 

tomorrow.  Follow-up with primary care this week.  Return to the emergency 

department with worsening or concerning symptoms of any sort.


Forms:  Smoking Cessation Education


Referrals: 


BABAK FLORES MD [Primary Care Provider] - Follow up as needed

## 2019-10-25 NOTE — ER DOCUMENT REPORT
ED Medical Screen (RME)





- General


Chief Complaint: Breathing Difficulty


Stated Complaint: DIFFICULTY BREATHING


Time Seen by Provider: 10/25/19 04:50


Primary Care Provider: 


BABAK FLORES MD [Primary Care Provider] - Follow up as needed


Notes: 


39-year-old male with a history of asthma and continued smoking comes by EMS for

chief complaint of difficulty breathing for the past week.  He states he had a 

fever yesterday as well.  He states that he feels much better now after 2 

DuoNeb's, Cymetra, 2 g of magnesium from EMS.  He denies any other medical 

history.





TRAVEL OUTSIDE OF THE [place] IN LAST 30 DAYS: No





- Related Data


Allergies/Adverse Reactions: 


                                        





tramadol Allergy (Verified 02/09/19 15:40)


   











Past Medical History


Pulmonary Medical History: Reports: Hx Asthma, Hx Bronchitis, Hx Pneumonia


   Denies: Hx COPD, Hx Intubation, Hx Respiratory Failure, Hx Sleep Apnea, Hx 

Tuberculosis


Neurological Medical History: Denies: Hx Cerebrovascular Accident, Hx Migraine, 

Hx Seizures


Endocrine Medical History: Denies: Hx Diabetes Mellitus Type 1, Hx Diabetes Meenu

litus Type 2, Hx Graves' Disease, Hx Hyperthyroidism, Hx Hypothyroidism


Renal/ Medical History: Denies: Hx Peritoneal Dialysis


Psychiatric Medical History: Reports: Hx Bipolar Disorder


Infectious Medical History: Reports: Hx MRSA


Past Surgical History: Reports: Hx Abdominal Surgery - umbilical hernia repair, 

Hx Orthopedic Surgery - R clavical; tibia





- Immunizations


Hx Diphtheria, Pertussis, Tetanus Vaccination: Yes





Physical Exam





- Respiratory


Breath sounds: Wheezing - Next Tory wheezes throughout, congested cough, however

patient can speak in full sentences and is not in respiratory distress





Course





- Re-evaluation


Re-evalutation: 


Patient still with expiratory wheezing throughout, congested cough, borderline 

hypoxia at 92 to 94% on room air.  Giving the third DuoNeb, placing on oxygen, 

work-up pending.  He is not tachycardic or hypotensive, not currently febrile.





I have greeted and performed a rapid initial assessment of this patient.  A 

comprehensive ED assessment and evaluation of the patient, analysis of test 

results and completion of the medical decision making process will be conducted 

by additional ED providers.





Doctor's Discharge





- Discharge


Referrals: 


BABAK FLORES MD [Primary Care Provider] - Follow up as needed

## 2019-11-23 ENCOUNTER — HOSPITAL ENCOUNTER (EMERGENCY)
Dept: HOSPITAL 62 - ER | Age: 39
Discharge: HOME | End: 2019-11-23
Payer: MEDICARE

## 2019-11-23 VITALS — SYSTOLIC BLOOD PRESSURE: 136 MMHG | DIASTOLIC BLOOD PRESSURE: 95 MMHG

## 2019-11-23 DIAGNOSIS — R06.03: ICD-10-CM

## 2019-11-23 DIAGNOSIS — J45.51: Primary | ICD-10-CM

## 2019-11-23 DIAGNOSIS — Z86.14: ICD-10-CM

## 2019-11-23 DIAGNOSIS — Z88.6: ICD-10-CM

## 2019-11-23 LAB
ADD MANUAL DIFF: NO
ALBUMIN SERPL-MCNC: 3.7 G/DL (ref 3.5–5)
ALP SERPL-CCNC: 50 U/L (ref 38–126)
ANION GAP SERPL CALC-SCNC: 7 MMOL/L (ref 5–19)
AST SERPL-CCNC: 21 U/L (ref 17–59)
BASOPHILS # BLD AUTO: 0.1 10^3/UL (ref 0–0.2)
BASOPHILS NFR BLD AUTO: 0.9 % (ref 0–2)
BILIRUB DIRECT SERPL-MCNC: 0.1 MG/DL (ref 0–0.4)
BILIRUB SERPL-MCNC: 0.1 MG/DL (ref 0.2–1.3)
BUN SERPL-MCNC: 16 MG/DL (ref 7–20)
CALCIUM: 9.1 MG/DL (ref 8.4–10.2)
CHLORIDE SERPL-SCNC: 106 MMOL/L (ref 98–107)
CO2 SERPL-SCNC: 30 MMOL/L (ref 22–30)
EOSINOPHIL # BLD AUTO: 0.7 10^3/UL (ref 0–0.6)
EOSINOPHIL NFR BLD AUTO: 8.5 % (ref 0–6)
ERYTHROCYTE [DISTWIDTH] IN BLOOD BY AUTOMATED COUNT: 16.1 % (ref 11.5–14)
GLUCOSE SERPL-MCNC: 82 MG/DL (ref 75–110)
HCT VFR BLD CALC: 35.7 % (ref 37.9–51)
HGB BLD-MCNC: 11.7 G/DL (ref 13.5–17)
LYMPHOCYTES # BLD AUTO: 1.3 10^3/UL (ref 0.5–4.7)
LYMPHOCYTES NFR BLD AUTO: 16.8 % (ref 13–45)
MCH RBC QN AUTO: 26.1 PG (ref 27–33.4)
MCHC RBC AUTO-ENTMCNC: 32.6 G/DL (ref 32–36)
MCV RBC AUTO: 80 FL (ref 80–97)
MONOCYTES # BLD AUTO: 0.6 10^3/UL (ref 0.1–1.4)
MONOCYTES NFR BLD AUTO: 7.4 % (ref 3–13)
NEUTROPHILS # BLD AUTO: 5.1 10^3/UL (ref 1.7–8.2)
NEUTS SEG NFR BLD AUTO: 66.4 % (ref 42–78)
PLATELET # BLD: 265 10^3/UL (ref 150–450)
POTASSIUM SERPL-SCNC: 4 MMOL/L (ref 3.6–5)
PROT SERPL-MCNC: 6.4 G/DL (ref 6.3–8.2)
RBC # BLD AUTO: 4.47 10^6/UL (ref 4.35–5.55)
TOTAL CELLS COUNTED % (AUTO): 100 %
WBC # BLD AUTO: 7.7 10^3/UL (ref 4–10.5)

## 2019-11-23 PROCEDURE — 96361 HYDRATE IV INFUSION ADD-ON: CPT

## 2019-11-23 PROCEDURE — 83605 ASSAY OF LACTIC ACID: CPT

## 2019-11-23 PROCEDURE — 87070 CULTURE OTHR SPECIMN AEROBIC: CPT

## 2019-11-23 PROCEDURE — 85025 COMPLETE CBC W/AUTO DIFF WBC: CPT

## 2019-11-23 PROCEDURE — 87040 BLOOD CULTURE FOR BACTERIA: CPT

## 2019-11-23 PROCEDURE — 96365 THER/PROPH/DIAG IV INF INIT: CPT

## 2019-11-23 PROCEDURE — 87205 SMEAR GRAM STAIN: CPT

## 2019-11-23 PROCEDURE — 96375 TX/PRO/DX INJ NEW DRUG ADDON: CPT

## 2019-11-23 PROCEDURE — 94640 AIRWAY INHALATION TREATMENT: CPT

## 2019-11-23 PROCEDURE — 71045 X-RAY EXAM CHEST 1 VIEW: CPT

## 2019-11-23 PROCEDURE — 99285 EMERGENCY DEPT VISIT HI MDM: CPT

## 2019-11-23 PROCEDURE — 36415 COLL VENOUS BLD VENIPUNCTURE: CPT

## 2019-11-23 PROCEDURE — 80053 COMPREHEN METABOLIC PANEL: CPT

## 2019-11-23 PROCEDURE — 87077 CULTURE AEROBIC IDENTIFY: CPT

## 2019-11-23 RX ADMIN — MAGNESIUM SULFATE IN DEXTROSE SCH MLS/HR: 10 INJECTION, SOLUTION INTRAVENOUS at 08:22

## 2019-11-23 RX ADMIN — MAGNESIUM SULFATE IN DEXTROSE SCH MLS/HR: 10 INJECTION, SOLUTION INTRAVENOUS at 09:00

## 2019-11-23 NOTE — ER DOCUMENT REPORT
ED Respiratory Problem





- General


Chief Complaint: Asthma Exacerbation


Stated Complaint: SHORTNESS OF BREATH


Time Seen by Provider: 11/23/19 07:51


Primary Care Provider: 


BABAK FLORES MD [Primary Care Provider] - Follow up as needed


Mode of Arrival: Ambulatory


Information source: Patient, [facility] Records


Notes: 





39-year-old male patient with asthma reports 2-week history of worsening 

breathing and wheezing.  States his nebulizers only helped briefly at this time.

 Reports he has had thick yellow sputum production.  No fevers that he knows of.


He did see his primary care provider on 11/20/2019.  He states he was not given 

any prescriptions.  Review of pharmacy records shows that a prescription for 

Augmentin was apparently electronically sent to the pharmacy on 11/20/2019 and a

prescription for Chantix was sent on 11/21/2019.  The patient was not aware that

these prescriptions have been sent to the pharmacy.


TRAVEL OUTSIDE OF THE [place] IN LAST 30 DAYS: No





- Related Data


Allergies/Adverse Reactions: 


                                        





tramadol Allergy (Verified 11/23/19 07:22)


   








Home Medications: albuterol





Past Medical History





- General


Information source: Patient





- Social History


Smoking Status: Never Smoker


Cigarette use (# per day): No


Chew tobacco use (# tins/day): No


Smoking Education Provided: No


Frequency of alcohol use: None


Drug Abuse: None


Occupation: unemployed


Lives with: Spouse/Significant other


Family History: Hypertension, Other - Unobtainable


Patient has suicidal ideation: No


Patient has homicidal ideation: No


Pulmonary Medical History: Reports: Hx Asthma, Hx Bronchitis, Hx Pneumonia


Psychiatric Medical History: Reports: Hx Bipolar Disorder


Infectious Medical History: Reports: Hx MRSA


Past Surgical History: Reports: Hx Abdominal Surgery - umbilical hernia repair, 

Hx Orthopedic Surgery - R clavical; tibia





- Immunizations


Hx Diphtheria, Pertussis, Tetanus Vaccination: Yes


Hx Pneumococcal Vaccination: 10/01/11





Review of Systems





- Review of Systems


Constitutional: No symptoms reported


EENT: No symptoms reported


Cardiovascular: No symptoms reported


Respiratory: See HPI, Cough, Short of breath, Sputum, Wheezing


Gastrointestinal: No symptoms reported


Genitourinary: No symptoms reported


Musculoskeletal: No symptoms reported


Skin: No symptoms reported


Neurological/Psychological: No symptoms reported





Physical Exam





- Vital signs


Vitals: 


                                        











Temp Pulse Resp BP Pulse Ox


 


 97.8 F   95   22 H  129/72 H  92 


 


 11/23/19 07:25  11/23/19 07:25  11/23/19 07:25  11/23/19 07:25  11/23/19 07:25














- General


General appearance: Alert, Anxious


In distress: Moderate





- HEENT


Head: Normocephalic, Atraumatic


Eyes: Normal


Pupils: PERRL





- Respiratory


Respiratory status: Retractions, Tachypnea


Breath sounds: Decreased air movement, Rhonchi, Wheezing





- Cardiovascular


Rhythm: Regular


Heart sounds: Normal auscultation


Murmur: No





- Abdominal


Inspection: Normal





- Back


Back: Normal





- Extremities


General upper extremity: Normal inspection


General lower extremity: Normal inspection





- Neurological


Neuro grossly intact: Yes





- Psychological


Associated symptoms: Anxious





- Skin


Skin Temperature: Warm


Skin Moisture: Dry


Skin Color: Normal





Course





- Re-evaluation


Re-evalutation: 





11/23/19 11:10


Patient reports he is feeling much better wants to go home.  He still does have 

diffuse expiratory expiratory wheezes, which he says is about normal for him.  

He states his breathing sounds bad and is messed up all the time.  He does have 

a prescription at the drugstore for Augmentin waiting for him.  Will provide 

prescription for prednisone.  He states he does have enough medicine for his 

nebulizers.





- Vital Signs


Vital signs: 


                                        











Temp Pulse Resp BP Pulse Ox


 


 97.8 F   95   19   136/95 H  95 


 


 11/23/19 07:25  11/23/19 07:25  11/23/19 08:01  11/23/19 08:01  11/23/19 08:01














- Laboratory


Result Diagrams: 


                                 11/23/19 08:23





                                 11/23/19 08:23


Laboratory results interpreted by me: 


                                        











  11/23/19 11/23/19





  08:23 08:23


 


Hgb  11.7 L 


 


Hct  35.7 L 


 


MCH  26.1 L 


 


RDW  16.1 H 


 


Eos % (Auto)  8.5 H 


 


Absolute Eos (auto)  0.7 H 


 


Total Bilirubin   0.1 L














- Diagnostic Test


Radiology reviewed: Image reviewed, Reports reviewed - Chest x-ray does not show

acute cardiopulmonary changes





Discharge





- Discharge


Clinical Impression: 


 Acute respiratory distress





Asthma exacerbation


Qualifiers:


 Asthma severity: severe Asthma persistence: persistent Qualified Code(s): 

J45.51 - Severe persistent asthma with (acute) exacerbation





Condition: Stable


Disposition: HOME, SELF-CARE


Additional Instructions: 


Continue your regular breathing treatments.


Drink plenty of fluids and get plenty of rest.


Add the prednisone as prescribed today.


 the Augmentin prescription waiting for you at your pharmacy and start 

taking that.


Follow-up with your primary care provider this week for recheck before you run 

out of the prednisone.





RETURN TO THE EMERGENCY ROOM IF ANY NEW OR WORSENING SYMPTOMS.








Prescriptions: 


Prednisone [Deltasone 20 mg Tablet] 20 mg PO ASDIR PRN #15 tablet


 PRN Reason: 


Referrals: 


BABAK FLORES MD [Primary Care Provider] - Follow up in 3-5 days

## 2019-11-23 NOTE — RADIOLOGY REPORT (SQ)
EXAM DESCRIPTION:  CHEST SINGLE VIEW



COMPLETED DATE/TIME:  11/23/2019 8:32 am



REASON FOR STUDY:  Asthma exacerbation



COMPARISON:  None.



EXAM PARAMETERS:  NUMBER OF VIEWS: One view.

TECHNIQUE: Single frontal radiographic view of the chest acquired.

RADIATION DOSE: NA

LIMITATIONS: None.



FINDINGS:  LUNGS AND PLEURA: No opacities, masses or pneumothorax. No pleural effusion.

MEDIASTINUM AND HILAR STRUCTURES: No masses.  Contour normal.

HEART AND VASCULAR STRUCTURES: Heart normal in size.  Normal vasculature.

BONES: No acute findings.

HARDWARE: None in the chest.

OTHER: No other significant finding.



IMPRESSION:  NO ACUTE RADIOGRAPHIC FINDING IN THE CHEST.



TECHNICAL DOCUMENTATION:  JOB ID:  [id number]

 2011 [employer]- All Rights Reserved



Reading location - IP/workstation name: [email or web address]

## 2019-12-02 ENCOUNTER — HOSPITAL ENCOUNTER (EMERGENCY)
Dept: HOSPITAL 62 - ER | Age: 39
Discharge: HOME | End: 2019-12-02
Payer: MEDICARE

## 2019-12-02 VITALS — SYSTOLIC BLOOD PRESSURE: 157 MMHG | DIASTOLIC BLOOD PRESSURE: 80 MMHG

## 2019-12-02 DIAGNOSIS — J45.901: Primary | ICD-10-CM

## 2019-12-02 DIAGNOSIS — Z87.891: ICD-10-CM

## 2019-12-02 DIAGNOSIS — R06.02: ICD-10-CM

## 2019-12-02 PROCEDURE — 96365 THER/PROPH/DIAG IV INF INIT: CPT

## 2019-12-02 PROCEDURE — 99284 EMERGENCY DEPT VISIT MOD MDM: CPT

## 2019-12-02 RX ADMIN — MAGNESIUM SULFATE IN DEXTROSE SCH: 10 INJECTION, SOLUTION INTRAVENOUS at 20:24

## 2019-12-02 RX ADMIN — MAGNESIUM SULFATE IN DEXTROSE SCH MLS/HR: 10 INJECTION, SOLUTION INTRAVENOUS at 18:57

## 2019-12-02 NOTE — ER DOCUMENT REPORT
ED General





- General


Chief Complaint: Breathing Difficulty


Stated Complaint: DIFFICULTY BREATHING


Time Seen by Provider: 12/02/19 18:38


Primary Care Provider: 


BABAK FLORES MD [Primary Care Provider] - Follow up as needed


TRAVEL OUTSIDE OF THE [place] IN LAST 30 DAYS: No





- HPI


Notes: 





Patient is a 39-year-old male with a history of asthma who presents to the 

emergency department via EMS for difficulty breathing.  Symptoms have been 

ongoing for some time.  He has had intermittent asthma exacerbations, was seen 

by his primary care provider recently.  He is also seen here in the ED 2 days 

ago.  He was started on Chantix, and Augmentin.  He states he has had some 

improvement but things seemed worse today.  He admits that he may have missed 

his prednisone doses for the last day or 2.  He denies any fevers.  No pain at 

this time.  He states he feels overall that his breathing is much improved after

treatment via ambulance.  He was administered Solu-Medrol and 3 DuoNeb's 

back-to-back.





- Related Data


Allergies/Adverse Reactions: 


                                        





tramadol Allergy (Verified 11/23/19 07:22)


   








Home Medications: Albuterol nebulizer, Augmentin, Chantix, prednisone (which she

has not taken in at least 48 hours)





Past Medical History





- General


Information source: Patient





- Social History


Smoking Status: Former Smoker - Quit November 30


Family History: Reviewed & Not Pertinent, Hypertension, Other - Unobtainable


Patient has suicidal ideation: No


Patient has homicidal ideation: No


Pulmonary Medical History: Reports: Hx Asthma, Hx Bronchitis, Hx Pneumonia


   Denies: Hx COPD, Hx Tuberculosis


Neurological Medical History: Denies: Hx Migraine, Hx Seizures


Endocrine Medical History: Denies: Hx Diabetes Mellitus Type 1, Hx Diabetes 

Mellitus Type 2


Psychiatric Medical History: Reports: Hx Bipolar Disorder


Infectious Medical History: Reports: Hx MRSA


Past Surgical History: Reports: Hx Abdominal Surgery - umbilical hernia repair, 

Hx Orthopedic Surgery - R clavical; tibia





- Immunizations


Hx Diphtheria, Pertussis, Tetanus Vaccination: Yes


Hx Pneumococcal Vaccination: 10/01/11





Review of Systems





- Review of Systems


Constitutional: No symptoms reported


EENT: No symptoms reported


Cardiovascular: No symptoms reported


Respiratory: See HPI


Gastrointestinal: No symptoms reported


Genitourinary: No symptoms reported


Musculoskeletal: No symptoms reported


Skin: No symptoms reported


Neurological/Psychological: No symptoms reported





Physical Exam





- Vital signs


Vitals: 


                                        











Pulse Ox


 


 100 


 


 12/02/19 18:22














- Notes


Notes: 





This is a very pleasant 39-year-old male who appears his stated age, no acute 

distress.  Vital signs reviewed, please refer to chart. Head is normocephalic, 

atraumatic.  Pupils equal round, reactive to light.  Neck is supple without 

meningismus.  Heart is regular rate and rhythm.  Lungs reveal mildly diminished 

breath sounds and scant expiratory wheezes throughout.  Abdomen is soft, 

nontender, normoactive bowel sounds throughout.  Extremities without cyanosis, 

clubbing. Posterior calves are nontender.  Peripheral pulses are equal.  Skin is

warm and dry.  Patient is awake, alert, neurological exam is nonfocal.





Course





- Re-evaluation


Re-evalutation: 





12/02/19 19:08


Patient presents emergency department for evaluation.  On arrival, the patient 

has a normal respiratory rate, his oxygen is 98%.  His heart rate is in the 70s.

 He has already been given multiple DuoNeb's and steroids.  Given his multiple 

visits to the hospital as of late, I am inclined to treat this further, he was 

given magnesium.  We will continue to monitor.  The importance of taking his 

prednisone as directed was stressed to the patient and he voiced understanding. 

We will continue to monitor.


12/02/19 20:41


I was notified by nursing the patient did not want the second gram of magnesium.

 I went in to evaluate the patient.  His respiratory rate is 16-18.  He is 

oxygenating at 95 to 97% on room air.  His lung exam is further improved, but he

does still have occasional wheezes.  The patient is already on antibiotics, 

which I believe is appropriate.  He is not showing any significant signs of 

pneumonia or infection at this time.  The patient does not want to stay for the 

magnesium.  I strongly advised him to fill the prescription of prednisone that I

write, and take it exactly as directed.  Otherwise he is to follow-up with 

primary care next week, and continue his efforts to quit smoking.











- Vital Signs


Vital signs: 


                                        











Temp Pulse Resp BP Pulse Ox


 


 98.7 F      20   134/76 H  100 


 


 12/02/19 18:24     12/02/19 18:24  12/02/19 18:24  12/02/19 18:24














Discharge





- Discharge


Clinical Impression: 


 Asthma exacerbation





Condition: Stable


Disposition: HOME, SELF-CARE


Instructions:  Asthma ([facility])


Additional Instructions: 


You have elected not to stay for the second gram of IV magnesium.  This could 

further improve your lung exam and asthma exacerbation.  Please take all of the 

prednisone exactly as directed, until gone, starting tomorrow.  Follow-up with 

your doctor this week.  Continue your efforts to quit smoking.  If your 

breathing worsens, or you develop new or concerning symptoms of any sort, please

return immediately to the emergency department for reevaluation.


Prescriptions: 


Prednisone [Deltasone] 20 mg PO DAILY #20 tablet


Prednisone [Deltasone 20 mg Tablet] See Protocol PO DAILY 5 Days #20 tablet


Prednisone [Deltasone 20 mg Tablet] See Protocol PO DAILY 5 Days #20 tablet


Referrals: 


BABAK FLORES MD [Primary Care Provider] - Follow up as needed

## 2019-12-30 NOTE — EKG REPORT
Reason For Visit:   Chief Complaint   Patient presents with     RECHECK     2 wk post-op Right shoulder Arthroscopic rotator cuff repair, arthroscopic subacromial decompression, arthroscopic biceps tenodesis, arthroscopic distal clavicle excision  DOS 12/12/19         PCP: Lyndon Stoll     ? 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  Work status?  Full time.  Date of injury: Many injuries over the years but no specific      Date of surgery: 1/2017  Type of surgery: Left rotator cuff repair By erik.  Date of surgery: 12/12/19  Type of surgery:   1.  Right shoulder arthroscopic rotator cuff repair of a 2 cm anterior to posterior supraspinatus tear with infraspinatus involvement.   2.  Right shoulder arthroscopic biceps tenodesis.   3.  Right shoulder arthroscopic distal clavicle excision.   4.  Right shoulder arthroscopic subacromial decompression with bony acromioplasty.  Smoker: No      Right hand dominant    SANE score  Affected shoulder: Right  Right shoulder SANE: 0  Left shoulder SANE: 100    There were no vitals taken for this visit.      Pain Assessment  Patient Currently in Pain: Corina Lawson, ATC         SEVERITY:- NORMAL ECG -

SINUS RHYTHM

:

Confirmed by: Yosi Chance MD 12-Apr-2018 14:02:35

## 2020-01-01 ENCOUNTER — HOSPITAL ENCOUNTER (INPATIENT)
Dept: HOSPITAL 62 - ER | Age: 40
LOS: 1 days | Discharge: HOME | DRG: 202 | End: 2020-01-02
Attending: INTERNAL MEDICINE | Admitting: INTERNAL MEDICINE
Payer: MEDICARE

## 2020-01-01 DIAGNOSIS — F31.9: ICD-10-CM

## 2020-01-01 DIAGNOSIS — J18.9: ICD-10-CM

## 2020-01-01 DIAGNOSIS — J45.51: Primary | ICD-10-CM

## 2020-01-01 DIAGNOSIS — Z88.8: ICD-10-CM

## 2020-01-01 DIAGNOSIS — Z86.14: ICD-10-CM

## 2020-01-01 DIAGNOSIS — Z79.51: ICD-10-CM

## 2020-01-01 DIAGNOSIS — F17.200: ICD-10-CM

## 2020-01-01 LAB
A TYPE INFLUENZA AG: NEGATIVE
ADD MANUAL DIFF: NO
ANION GAP SERPL CALC-SCNC: 14 MMOL/L (ref 5–19)
ARTERIAL BLOOD FIO2: (no result)
ARTERIAL BLOOD H2CO3: 1.37 MMOL/L (ref 1.05–1.35)
ARTERIAL BLOOD HCO3: 26.2 MMOL/L (ref 20–24)
ARTERIAL BLOOD PCO2: 45.6 MMHG (ref 35–45)
ARTERIAL BLOOD PH: 7.38 (ref 7.35–7.45)
ARTERIAL BLOOD PO2: 82.8 MMHG (ref 80–100)
ARTERIAL BLOOD TOTAL CO2: 27.6 MMOL/L (ref 23–27)
B INFLUENZA AG: NEGATIVE
BASE EXCESS BLDA CALC-SCNC: 0.7 MMOL/L
BASOPHILS # BLD AUTO: 0.1 10^3/UL (ref 0–0.2)
BASOPHILS NFR BLD AUTO: 0.8 % (ref 0–2)
BUN SERPL-MCNC: 26 MG/DL (ref 7–20)
CALCIUM: 8.6 MG/DL (ref 8.4–10.2)
CHLORIDE SERPL-SCNC: 101 MMOL/L (ref 98–107)
CO2 SERPL-SCNC: 25 MMOL/L (ref 22–30)
EOSINOPHIL # BLD AUTO: 0.3 10^3/UL (ref 0–0.6)
EOSINOPHIL NFR BLD AUTO: 4 % (ref 0–6)
ERYTHROCYTE [DISTWIDTH] IN BLOOD BY AUTOMATED COUNT: 16.3 % (ref 11.5–14)
GLUCOSE SERPL-MCNC: 108 MG/DL (ref 75–110)
HCT VFR BLD CALC: 32.9 % (ref 37.9–51)
HGB BLD-MCNC: 11.1 G/DL (ref 13.5–17)
LYMPHOCYTES # BLD AUTO: 0.9 10^3/UL (ref 0.5–4.7)
LYMPHOCYTES NFR BLD AUTO: 12.6 % (ref 13–45)
MCH RBC QN AUTO: 26.9 PG (ref 27–33.4)
MCHC RBC AUTO-ENTMCNC: 33.7 G/DL (ref 32–36)
MCV RBC AUTO: 80 FL (ref 80–97)
MONOCYTES # BLD AUTO: 0.7 10^3/UL (ref 0.1–1.4)
MONOCYTES NFR BLD AUTO: 9.8 % (ref 3–13)
NEUTROPHILS # BLD AUTO: 5.4 10^3/UL (ref 1.7–8.2)
NEUTS SEG NFR BLD AUTO: 72.8 % (ref 42–78)
PLATELET # BLD: 238 10^3/UL (ref 150–450)
POTASSIUM SERPL-SCNC: 3.4 MMOL/L (ref 3.6–5)
RBC # BLD AUTO: 4.13 10^6/UL (ref 4.35–5.55)
SAO2 % BLDA: 95.9 % (ref 94–98)
TOTAL CELLS COUNTED % (AUTO): 100 %
WBC # BLD AUTO: 7.4 10^3/UL (ref 4–10.5)

## 2020-01-01 PROCEDURE — 36415 COLL VENOUS BLD VENIPUNCTURE: CPT

## 2020-01-01 PROCEDURE — 87205 SMEAR GRAM STAIN: CPT

## 2020-01-01 PROCEDURE — 96365 THER/PROPH/DIAG IV INF INIT: CPT

## 2020-01-01 PROCEDURE — 99238 HOSP IP/OBS DSCHRG MGMT 30/<: CPT

## 2020-01-01 PROCEDURE — 99292 CRITICAL CARE ADDL 30 MIN: CPT

## 2020-01-01 PROCEDURE — 87040 BLOOD CULTURE FOR BACTERIA: CPT

## 2020-01-01 PROCEDURE — 96375 TX/PRO/DX INJ NEW DRUG ADDON: CPT

## 2020-01-01 PROCEDURE — 85025 COMPLETE CBC W/AUTO DIFF WBC: CPT

## 2020-01-01 PROCEDURE — 80048 BASIC METABOLIC PNL TOTAL CA: CPT

## 2020-01-01 PROCEDURE — 87070 CULTURE OTHR SPECIMN AEROBIC: CPT

## 2020-01-01 PROCEDURE — 87804 INFLUENZA ASSAY W/OPTIC: CPT

## 2020-01-01 PROCEDURE — 99291 CRITICAL CARE FIRST HOUR: CPT

## 2020-01-01 PROCEDURE — 94640 AIRWAY INHALATION TREATMENT: CPT

## 2020-01-01 PROCEDURE — 83605 ASSAY OF LACTIC ACID: CPT

## 2020-01-01 PROCEDURE — 87077 CULTURE AEROBIC IDENTIFY: CPT

## 2020-01-01 PROCEDURE — 82803 BLOOD GASES ANY COMBINATION: CPT

## 2020-01-01 PROCEDURE — 71045 X-RAY EXAM CHEST 1 VIEW: CPT

## 2020-01-01 RX ADMIN — MAGNESIUM SULFATE IN DEXTROSE SCH MLS/HR: 10 INJECTION, SOLUTION INTRAVENOUS at 05:30

## 2020-01-01 RX ADMIN — FENTANYL CITRATE PRN MCG: 50 INJECTION INTRAMUSCULAR; INTRAVENOUS at 10:58

## 2020-01-01 RX ADMIN — Medication SCH: at 21:33

## 2020-01-01 RX ADMIN — Medication SCH: at 15:57

## 2020-01-01 RX ADMIN — MAGNESIUM SULFATE IN DEXTROSE SCH MLS/HR: 10 INJECTION, SOLUTION INTRAVENOUS at 05:18

## 2020-01-01 RX ADMIN — IPRATROPIUM BROMIDE AND ALBUTEROL SULFATE SCH ML: 2.5; .5 SOLUTION RESPIRATORY (INHALATION) at 23:54

## 2020-01-01 RX ADMIN — IPRATROPIUM BROMIDE AND ALBUTEROL SULFATE SCH ML: 2.5; .5 SOLUTION RESPIRATORY (INHALATION) at 19:32

## 2020-01-01 RX ADMIN — METHYLPREDNISOLONE SODIUM SUCCINATE SCH MG: 125 INJECTION, POWDER, FOR SOLUTION INTRAMUSCULAR; INTRAVENOUS at 21:29

## 2020-01-01 RX ADMIN — IPRATROPIUM BROMIDE AND ALBUTEROL SULFATE SCH ML: 2.5; .5 SOLUTION RESPIRATORY (INHALATION) at 12:06

## 2020-01-01 RX ADMIN — FENTANYL CITRATE PRN MCG: 50 INJECTION INTRAMUSCULAR; INTRAVENOUS at 21:27

## 2020-01-01 RX ADMIN — IPRATROPIUM BROMIDE AND ALBUTEROL SULFATE SCH ML: 2.5; .5 SOLUTION RESPIRATORY (INHALATION) at 16:53

## 2020-01-01 RX ADMIN — IPRATROPIUM BROMIDE AND ALBUTEROL SULFATE SCH ML: 2.5; .5 SOLUTION RESPIRATORY (INHALATION) at 08:13

## 2020-01-01 NOTE — RADIOLOGY REPORT (SQ)
Chest single view on  1/1/2020 at 4:57 AM 



CLINICAL INDICATION: Difficulty breathing



COMPARISON: 11/23/2019



FINDINGS: There has been development of right greater than left

lower lung opacities consistent with areas of atelectasis and/or

pneumonia, consider aspiration. The lungs are otherwise clear.

Cardiac, hilar and mediastinal contours are within normal limits.

Pulmonary vascularity is within normal limits. An old right

clavicle fracture is again noted.



IMPRESSION: Developing bibasilar atelectasis and/or pneumonia,

consider aspiration.
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
## 2020-01-01 NOTE — ER DOCUMENT REPORT
ED General





- General


Chief Complaint: Breathing Difficulty


Stated Complaint: DIFFICULTY BREATHING


Time Seen by Provider: 01/01/20 04:52


TRAVEL OUTSIDE OF THE [place] IN LAST 30 DAYS: No





- Related Data


Allergies/Adverse Reactions: 


                                        





tramadol Allergy (Verified 01/01/20 04:56)


   











Past Medical History





- Social History


Smoking Status: Current Every Day Smoker


Family History: Reviewed & Not Pertinent, Hypertension, Other - Unobtainable


Pulmonary Medical History: Reports: Hx Asthma, Hx Bronchitis, Hx Pneumonia


   Denies: Hx COPD, Hx Tuberculosis


Neurological Medical History: Denies: Hx Migraine, Hx Seizures


Endocrine Medical History: Denies: Hx Diabetes Mellitus Type 1, Hx Diabetes 

Mellitus Type 2


Psychiatric Medical History: Reports: Hx Bipolar Disorder


Infectious Medical History: Reports: Hx MRSA


Past Surgical History: Reports: Hx Abdominal Surgery - umbilical hernia repair, 

Hx Orthopedic Surgery - R clavical; tibia





- Immunizations


Hx Diphtheria, Pertussis, Tetanus Vaccination: Yes


Hx Pneumococcal Vaccination: 10/01/11





Physical Exam





- Vital signs


Vitals: 


                                        











Resp


 


 23 H


 


 01/01/20 04:45














- Notes


Notes: 





Patient brought in by paramedics with a complaint of shortness of breath is been

going on all day.  Patient says he felt better for the past several days with 

fever cough generalized myalgias.  He has had some intermittent nausea and 

vomiting but no abdominal pain.  No diarrhea.  He has been using his aerosol 

machine with Combivent multiple times a day with only minimal relief.  Reports 

he developed some pain over the left anterior chest today after coughing 

episode.  Localized increased with breathing and cough.  Denies any trauma falls

or heavy lifting is been no recent travel or immobilization street of DVT in the

family is reports that he has had some intermittent occasional vomiting after 

bad coughing spell today but in between is been tolerating liquids well





His medical history sent for asthma no diabetes or hypertension.  Last admission

was about 1 to 2 years ago social history does not smoke or drink at all.





Review of systems pertinent positives and negatives in HPI otherwise all the 

systems were reviewed and acutely negative





Pastoral care patient was given 125 mg of Solu-Medrol and 3 DuoNeb treatments he

reported sats are 88% on room air








PHYSICIAN EXAM -vital signs are noted triage note and note from triage reviewed


 


GENERAL: Well-appearing, well-nourished and in __moderate respiratory distress 

he is able to talk in full sentences____


 


HEAD: Atraumatic, normocephalic.


 


EYES: Pupils equal round and reactive to light, extraocular movements intact, 

sclera anicteric, conjunctiva are normal.


 


ENT: nares patent, oropharynx clear without exudates.  Dry mucous membranes.


 


NECK:  supple without lymphadenopathy


 


LUNGS: Breath sounds noted wheezes throughout with some moderate respiratory 

distress.  Got significant tenderness over the left anterior chest at the lower 

costal margin this to the left of the midline no crepitus it does reproduce the 

pain the rest of the chest is nontender


 


HEART: Rapid and regular


 


ABDOMEN: Soft, nontender, normoactive bowel sounds. 


 


EXTREMITIES: No deformity, no  edema.  No palpable cords


 


NEUROLOGICAL: No focal neurological deficits. Moves all extremities 

spontaneously and on command.


 


PSYCH: Normal mood, normal affect.


 


SKIN: Warm, Dry, normal turgor, no rashes or lesions noted.





BACK-nontender in the midline





Differential diagnosis asthma pneumonia pleurisy














Course





- Re-evaluation


Re-evalutation: 





01/01/20 06:56


ED patient was given additional DuoNeb treatment with Pulmicort.  Given IV 

fluids and Toradol for pain.  Blood cultures were ordered.  Reviewing his x-ray 

he was started on Levaquin and vancomycin concern for possible MRSA since he 

recently had flulike symptoms.  This time his O2 sat started to drop initially 

O2.  On 6 L he was still running only 90% looks fairly comfortable with a good 

heart rate.  And an ABG and based on that patient may need BiPAP.








Medical decision making patient with a history of asthma presents with 

exacerbation of his asthma evidence of pneumonia and hypoxia will need admission

to the hospital have consulted the ICU and they have accepted the patient





- Vital Signs


Vital signs: 


                                        











Temp Pulse Resp BP Pulse Ox


 


       15   130/75 H  97 


 


       01/01/20 05:01  01/01/20 05:01  01/01/20 05:01














- Laboratory


Result Diagrams: 


                                 01/01/20 05:10





                                 01/01/20 05:10


Laboratory results interpreted by me: 


                                        











  01/01/20 01/01/20





  05:10 05:10


 


RBC  4.13 L 


 


Hgb  11.1 L 


 


Hct  32.9 L 


 


MCH  26.9 L 


 


RDW  16.3 H 


 


Lymph % (Auto)  12.6 L 


 


Potassium   3.4 L


 


BUN   26 H














Critical Care Note





- Critical Care Note


Total time excluding time spent on procedures (mins): 75





Discharge





- Discharge


Clinical Impression: 


Status asthmaticus


Qualifiers:


 Asthma severity: severe 





Condition: Stable


Disposition: ADMITTED AS INPATIENT


Admitting Provider: Santiago (Intensivist)


Unit Admitted: ICU

## 2020-01-02 VITALS — SYSTOLIC BLOOD PRESSURE: 130 MMHG | DIASTOLIC BLOOD PRESSURE: 82 MMHG

## 2020-01-02 RX ADMIN — IPRATROPIUM BROMIDE AND ALBUTEROL SULFATE SCH ML: 2.5; .5 SOLUTION RESPIRATORY (INHALATION) at 08:40

## 2020-01-02 RX ADMIN — IPRATROPIUM BROMIDE AND ALBUTEROL SULFATE SCH: 2.5; .5 SOLUTION RESPIRATORY (INHALATION) at 04:35

## 2020-01-02 RX ADMIN — METHYLPREDNISOLONE SODIUM SUCCINATE SCH: 125 INJECTION, POWDER, FOR SOLUTION INTRAMUSCULAR; INTRAVENOUS at 05:13

## 2020-01-02 RX ADMIN — METHYLPREDNISOLONE SODIUM SUCCINATE SCH MG: 125 INJECTION, POWDER, FOR SOLUTION INTRAMUSCULAR; INTRAVENOUS at 05:45

## 2020-01-02 RX ADMIN — Medication SCH: at 05:13

## 2020-01-02 NOTE — PDOC DISCHARGE SUMMARY
Impression





- Admit/DC Date/PCP


Admission Date/Primary Care Provider: 


  01/01/20 06:47





  BABAK FLORES MD





Discharge Date: 01/02/20





- Discharge Diagnosis


(1) Tobacco abuse


Is this a current diagnosis for this admission?: Yes   





(2) Asthma exacerbation


Is this a current diagnosis for this admission?: Yes   





(3) Left lower lobe pneumonia


Is this a current diagnosis for this admission?: Yes   





- Assessment


Summary: 


This patient is a known 40 yo with a history of severe asthma. He has been fig

hting an exacerbation since around 12/25 when he was exposed to a relative with 

the flu. The next day he felt somewhat SOB and has been battling this since. He 

presented with difficulty talking, some wheezing and has responded well to 

nebulizers,steroids and levoquin. He is still somewhat short of breath but much 

improved and wants to continue treatment at home. He will be sent home today. 

His history is significant for ARDS 2018 with a course of ecmo. For this reason,

for safetey's sake, he was watched in the ICU.





- Additional Information


Resuscitation Status: Full Code


Discharge Diet: Regular


Discharge Activity: Activity As Tolerated


Referrals: 


BABAK FLORES MD [Primary Care Provider] - Follow up as needed


Prescriptions: 


Levofloxacin [Levaquin 500 mg Tablet] 500 mg PO DAILY 5 Days #5 tablet


Prednisone 10 mg PO DAILY 14 Days #21 tablet


Home Medications: 








Albuterol Sulfate [Ventolin Hfa 8 gm Mdi (1 Mdi/ER Disp)] 2 puff IH Q6HP PRN 

01/01/20 


Gabapentin [Neurontin 300 mg Capsule] 600 mg PO QHS 01/01/20 


Ipratropium/Albuterol Sulfate [Combivent Respimat 4 gm Mdi] 1 puff IH Q6HP PRN 

MDD 6 PUFFS 01/01/20 


Ipratropium/Albuterol Sulfate [Duoneb 3 ml Ampul] 3 ml NEB RTQ4HP PRN 01/01/20 


Montelukast Sodium [Singulair 10 mg Tablet] 10 mg PO QHS 01/01/20 


Levofloxacin [Levaquin 500 mg Tablet] 500 mg PO DAILY 5 Days #5 tablet 01/02/20 


Prednisone 10 mg PO DAILY 14 Days #21 tablet 01/02/20 











History of Present Illiness


History of Present Illness: 


CHRISTY JEFFERSON is a 39 year old male








Hospital Course


Hospital Course: 


IV levoquin, nebulizers with albuterol. Marked improvement. Much less shortness 

of breath. Has primary care.





Physical Exam


Vital Signs: 





                                        











Temp Pulse Resp BP Pulse Ox


 


 98 F   91   18   136/77 H  96 


 


 01/02/20 06:03  01/02/20 06:03  01/02/20 06:03  01/02/20 06:03  01/02/20 06:03








                                 Intake & Output











 01/01/20 01/02/20 01/03/20





 06:59 06:59 06:59


 


Intake Total 600 100 


 


Output Total  1000 


 


Balance 600 -900 


 


Weight 81.647 kg 81.6 kg 











General appearance: PRESENT: no acute distress, cooperative, thin


Head exam: PRESENT: atraumatic, normocephalic


Eye exam: PRESENT: conjunctiva pink, EOMI, PERRLA.  ABSENT: scleral icterus


Ear exam: PRESENT: normal external ear exam


Mouth exam: PRESENT: moist, tongue midline


Respiratory exam: PRESENT: clear to auscultation moise, wheezes, other - Mild 

wheezing on L side. Good air movement.  ABSENT: rales, rhonchi


Pulses: PRESENT: normal dorsalis pedis pul


GI/Abdominal exam: PRESENT: normal bowel sounds, soft.  ABSENT: distended, 

guarding, mass, organolmegaly, rebound, tenderness


Rectal exam: PRESENT: deferred


Extremities exam: PRESENT: full ROM.  ABSENT: calf tenderness, clubbing, pedal 

edema


Musculoskeletal exam: PRESENT: ambulatory, normal inspection


Neurological exam: PRESENT: alert, awake, oriented to person, oriented to place,

oriented to time, oriented to situation, CN II-XII grossly intact.  ABSENT: 

motor sensory deficit


Skin exam: PRESENT: dry, intact, warm.  ABSENT: cyanosis, rash





Results


Laboratory Results: 





                                        











WBC  7.4 10^3/uL (4.0-10.5)   01/01/20  05:10    


 


RBC  4.13 10^6/uL (4.35-5.55)  L  01/01/20  05:10    


 


Hgb  11.1 g/dL (13.5-17.0)  L  01/01/20  05:10    


 


Hct  32.9 % (37.9-51.0)  L  01/01/20  05:10    


 


MCV  80 fl (80-97)   01/01/20  05:10    


 


MCH  26.9 pg (27.0-33.4)  L  01/01/20  05:10    


 


MCHC  33.7 g/dL (32.0-36.0)   01/01/20  05:10    


 


RDW  16.3 % (11.5-14.0)  H  01/01/20  05:10    


 


Plt Count  238 10^3/uL (150-450)   01/01/20  05:10    


 


Lymph % (Auto)  12.6 % (13-45)  L  01/01/20  05:10    


 


Mono % (Auto)  9.8 % (3-13)   01/01/20  05:10    


 


Eos % (Auto)  4.0 % (0-6)   01/01/20  05:10    


 


Baso % (Auto)  0.8 % (0-2)   01/01/20  05:10    


 


Absolute Neuts (auto)  5.4 10^3/uL (1.7-8.2)   01/01/20  05:10    


 


Absolute Lymphs (auto)  0.9 10^3/uL (0.5-4.7)   01/01/20  05:10    


 


Absolute Monos (auto)  0.7 10^3/uL (0.1-1.4)   01/01/20  05:10    


 


Absolute Eos (auto)  0.3 10^3/uL (0.0-0.6)   01/01/20  05:10    


 


Absolute Basos (auto)  0.1 10^3/uL (0.0-0.2)   01/01/20  05:10    


 


Seg Neutrophils %  72.8 % (42-78)   01/01/20  05:10    


 


Carbonic Acid  1.37 mmol/L (1.05-1.35)  H  01/01/20  08:10    


 


HCO3/H2CO3 Ratio  19:1   01/01/20  08:10    


 


ABG pH  7.38  (7.35-7.45)   01/01/20  08:10    


 


ABG pCO2  45.6 mmHg (35-45)  H  01/01/20  08:10    


 


ABG pO2  82.8 mmHg ()   01/01/20  08:10    


 


ABG HCO3  26.2 mmol/L (20-24)  H  01/01/20  08:10    


 


ABG Total CO2  27.6 mmol/L (23-27)  H  01/01/20  08:10    


 


ABG O2 Saturation  95.9 % (94-98)   01/01/20  08:10    


 


ABG Base Excess  0.7 mmol/L  01/01/20  08:10    


 


FiO2  10L   01/01/20  08:10    


 


Sodium  139.7 mmol/L (137-145)   01/01/20  05:10    


 


Potassium  3.4 mmol/L (3.6-5.0)  L  01/01/20  05:10    


 


Chloride  101 mmol/L ()   01/01/20  05:10    


 


Carbon Dioxide  25 mmol/L (22-30)   01/01/20  05:10    


 


Anion Gap  14  (5-19)   01/01/20  05:10    


 


BUN  26 mg/dL (7-20)  H  01/01/20  05:10    


 


Creatinine  0.93 mg/dL (0.52-1.25)   01/01/20  05:10    


 


Est GFR ( Amer)  > 60  (>60)   01/01/20  05:10    


 


Est GFR (MDRD) Non-Af  > 60  (>60)   01/01/20  05:10    


 


Glucose  108 mg/dL ()   01/01/20  05:10    


 


Lactic Acid  0.7 mmol/L (0.7-2.1)   01/01/20  05:10    


 


Calcium  8.6 mg/dL (8.4-10.2)   01/01/20  05:10    


 


Influenza A (Rapid)  NEGATIVE  (NEGATIVE)   01/01/20  09:30    


 


Influenza B (Rapid)  NEGATIVE  (NEGATIVE)   01/01/20  09:30    











Impressions: 





                                        





Chest X-Ray  01/01/20 04:46


IMPRESSION: Developing bibasilar atelectasis and/or pneumonia,


consider aspiration. 


 














Plan


Health Concerns: 


Asthma recurrence.


Plan of Treatment: 


Out patient treatment with inhaler, prednisone X 2 weeks, levowuin X 5 days.


Goals: 


Back to baseline function


Critical Time: 30


Level of Care: MEDICAL





Stroke


Is this a Stroke Patient?: No





Acute Heart Failure





- **


Is this a Heart Failure Patient?: No

## 2020-01-09 ENCOUNTER — HOSPITAL ENCOUNTER (EMERGENCY)
Dept: HOSPITAL 62 - ER | Age: 40
Discharge: LEFT BEFORE DISCHARGE | End: 2020-01-09
Payer: MEDICARE

## 2020-01-09 VITALS — SYSTOLIC BLOOD PRESSURE: 109 MMHG | DIASTOLIC BLOOD PRESSURE: 42 MMHG

## 2020-01-09 DIAGNOSIS — J45.901: Primary | ICD-10-CM

## 2020-01-09 DIAGNOSIS — Z86.14: ICD-10-CM

## 2020-01-09 LAB
ABSOLUTE LYMPHOCYTES# (MANUAL): 1.5 10^3/UL (ref 0.5–4.7)
ABSOLUTE MONOCYTES # (MANUAL): 0.6 10^3/UL (ref 0.1–1.4)
ADD MANUAL DIFF: YES
ALBUMIN SERPL-MCNC: 3.2 G/DL (ref 3.5–5)
ALP SERPL-CCNC: 58 U/L (ref 38–126)
ANION GAP SERPL CALC-SCNC: 7 MMOL/L (ref 5–19)
ANISOCYTOSIS BLD QL SMEAR: (no result)
AST SERPL-CCNC: 17 U/L (ref 17–59)
BASOPHILS NFR BLD MANUAL: 0 % (ref 0–2)
BILIRUB DIRECT SERPL-MCNC: 0.2 MG/DL (ref 0–0.4)
BILIRUB SERPL-MCNC: 0.2 MG/DL (ref 0.2–1.3)
BUN SERPL-MCNC: 8 MG/DL (ref 7–20)
CALCIUM: 8.8 MG/DL (ref 8.4–10.2)
CHLORIDE SERPL-SCNC: 103 MMOL/L (ref 98–107)
CO2 SERPL-SCNC: 29 MMOL/L (ref 22–30)
CRP SERPL-MCNC: 47.4 MG/L (ref ?–10)
EOSINOPHIL NFR BLD MANUAL: 5 % (ref 0–6)
ERYTHROCYTE [DISTWIDTH] IN BLOOD BY AUTOMATED COUNT: 16.6 % (ref 11.5–14)
GLUCOSE SERPL-MCNC: 100 MG/DL (ref 75–110)
HCT VFR BLD CALC: 32 % (ref 37.9–51)
HGB BLD-MCNC: 10.4 G/DL (ref 13.5–17)
MCH RBC QN AUTO: 26.3 PG (ref 27–33.4)
MCHC RBC AUTO-ENTMCNC: 32.7 G/DL (ref 32–36)
MCV RBC AUTO: 80 FL (ref 80–97)
MONOCYTES % (MANUAL): 7 % (ref 3–13)
PLATELET # BLD: 527 10^3/UL (ref 150–450)
PLATELET COMMENT: (no result)
POIKILOCYTOSIS BLD QL SMEAR: SLIGHT
POLYCHROMASIA BLD QL SMEAR: SLIGHT
POTASSIUM SERPL-SCNC: 3.9 MMOL/L (ref 3.6–5)
PROT SERPL-MCNC: 6.3 G/DL (ref 6.3–8.2)
RBC # BLD AUTO: 3.98 10^6/UL (ref 4.35–5.55)
SEGMENTED NEUTROPHILS % (MAN): 70 % (ref 42–78)
TARGETS BLD QL SMEAR: SLIGHT
TOTAL CELLS COUNTED BLD: 100
VARIANT LYMPHS NFR BLD MANUAL: 18 % (ref 13–45)
WBC # BLD AUTO: 8.1 10^3/UL (ref 4–10.5)

## 2020-01-09 PROCEDURE — 87040 BLOOD CULTURE FOR BACTERIA: CPT

## 2020-01-09 PROCEDURE — 36415 COLL VENOUS BLD VENIPUNCTURE: CPT

## 2020-01-09 PROCEDURE — 71045 X-RAY EXAM CHEST 1 VIEW: CPT

## 2020-01-09 PROCEDURE — 93010 ELECTROCARDIOGRAM REPORT: CPT

## 2020-01-09 PROCEDURE — 94640 AIRWAY INHALATION TREATMENT: CPT

## 2020-01-09 PROCEDURE — 85025 COMPLETE CBC W/AUTO DIFF WBC: CPT

## 2020-01-09 PROCEDURE — 80053 COMPREHEN METABOLIC PANEL: CPT

## 2020-01-09 PROCEDURE — 86140 C-REACTIVE PROTEIN: CPT

## 2020-01-09 PROCEDURE — 84484 ASSAY OF TROPONIN QUANT: CPT

## 2020-01-09 PROCEDURE — 99281 EMR DPT VST MAYX REQ PHY/QHP: CPT

## 2020-01-09 PROCEDURE — 93005 ELECTROCARDIOGRAM TRACING: CPT

## 2020-01-09 NOTE — ER DOCUMENT REPORT
ED Medical Screen (RME)





- General


Chief Complaint: Asthma Exacerbation


Stated Complaint: BREATHING PROBLEMS


Time Seen by Provider: 01/09/20 12:53


Primary Care Provider: 


BABAK FLORES MD [Primary Care Provider] - Follow up as needed


TRAVEL OUTSIDE OF THE [place] IN LAST 30 DAYS: No





- HPI


Notes: 





01/09/20 12:58


39-year-old male with a history of asthma, recent dx of pneumonia presents to 

the emergency room for shortness of breath and left lung pain.  He was recently 

discharged on January 3rd for bilateral pneumonia, patient finished his course 

of antibiotics. He was seen by his primary care provider 2 days ago, they did 

place him on prednisone however he is still experiencing shortness of breath.  

Patient is unsure if he still has pneumonia.  Patient reports weakness.  

Symptoms are progressive.  Patient has not been nebulizing at home.  Denies any 

fevers chills, nausea vomiting diarrhea.  My PET





I have greeted and performed a rapid initial assessment of this patient.  A 

comprehensive ED assessment and evaluation of the patient, analysis of test 

results and completion of the medical decision making process will be conducted 

by additional ED providers.





PHYSICAL EXAMINATION:





GENERAL: Well-appearing, well-nourished and in no acute distress.





HEAD: Atraumatic, normocephalic.





EYES: Pupils equal round extraocular movements intact,  conjunctiva are normal.





NECK: Normal range of motion





CV: s1, s2 regular 





LUNGS: Wheezing heard throughout, tachypnea





Musculoskeletal: Normal range of motion





NEUROLOGICAL:  Normal speech, normal gait. 





SKIN: Warm, Dry, normal turgor, no rashes or lesions noted.





- Related Data


Allergies/Adverse Reactions: 


                                        





tramadol Allergy (Verified 01/01/20 04:56)


   











Past Medical History





- Past Medical History


Cardiac Medical History: 


   Denies: Hx Congestive Heart Failure, Hx DVT


Pulmonary Medical History: Reports: Hx Asthma, Hx Bronchitis, Hx Pneumonia, Hx 

Intubation, Hx Respiratory Failure


   Denies: Hx COPD, Hx Tuberculosis


Neurological Medical History: Denies: Hx Migraine, Hx Seizures


Endocrine Medical History: Denies: Hx Diabetes Mellitus Type 1, Hx Diabetes 

Mellitus Type 2


Psychiatric Medical History: Reports: Hx Bipolar Disorder


Infectious Medical History: Reports: Hx MRSA


Past Surgical History: Reports: Hx Abdominal Surgery - umbilical hernia repair, 

Hx Herniorrhaphy, Hx Orthopedic Surgery - R clavical; tibia





- Immunizations


Hx Diphtheria, Pertussis, Tetanus Vaccination: Yes





Physical Exam





- Vital signs


Vitals: 





                                        











Temp Pulse Resp BP Pulse Ox


 


 98 F   68   36 H  109/42 L  94 


 


 01/09/20 12:46  01/09/20 12:46  01/09/20 12:46  01/09/20 12:46  01/09/20 12:46














Course





- Vital Signs


Vital signs: 





                                        











Temp Pulse Resp BP Pulse Ox


 


 98 F   68   36 H  109/42 L  94 


 


 01/09/20 12:46  01/09/20 12:46  01/09/20 12:46  01/09/20 12:46  01/09/20 12:46














Doctor's Discharge





- Discharge


Referrals: 


BABAK FLORES MD [Primary Care Provider] - Follow up as needed

## 2020-01-09 NOTE — RADIOLOGY REPORT (SQ)
EXAM DESCRIPTION:  CHEST SINGLE VIEW



COMPLETED DATE/TIME:  1/9/2020 1:55 pm



REASON FOR STUDY:  sob, hx of pna



COMPARISON:  1/1/2020



EXAM PARAMETERS:  NUMBER OF VIEWS: One view.

TECHNIQUE: Single frontal radiographic view of the chest acquired.

RADIATION DOSE: NA

LIMITATIONS: None.



FINDINGS:  LUNGS AND PLEURA: Subsegmental airspace disease left lower lobe.  Small left pleural effus
ion.

MEDIASTINUM AND HILAR STRUCTURES: No masses.  Contour normal.

HEART AND VASCULAR STRUCTURES: Heart normal in size.  Normal vasculature.

BONES: Old right clavicle fracture.

HARDWARE: None in the chest.

OTHER: No other significant finding.



IMPRESSION:  Left lower lobe pneumonia.  No significant change.



TECHNICAL DOCUMENTATION:  JOB ID:  [id number]

 2011 [employer]- All Rights Reserved



Reading location - IP/workstation name: [email or web address]

## 2020-02-09 ENCOUNTER — HOSPITAL ENCOUNTER (INPATIENT)
Dept: HOSPITAL 62 - ER | Age: 40
LOS: 1 days | Discharge: LEFT BEFORE DISCHARGE | DRG: 189 | End: 2020-02-10
Attending: FAMILY MEDICINE | Admitting: FAMILY MEDICINE
Payer: MEDICARE

## 2020-02-09 DIAGNOSIS — F31.9: ICD-10-CM

## 2020-02-09 DIAGNOSIS — Z79.899: ICD-10-CM

## 2020-02-09 DIAGNOSIS — J45.41: ICD-10-CM

## 2020-02-09 DIAGNOSIS — Z86.14: ICD-10-CM

## 2020-02-09 DIAGNOSIS — Z79.52: ICD-10-CM

## 2020-02-09 DIAGNOSIS — F19.10: ICD-10-CM

## 2020-02-09 DIAGNOSIS — J45.42: ICD-10-CM

## 2020-02-09 DIAGNOSIS — J96.01: Primary | ICD-10-CM

## 2020-02-09 DIAGNOSIS — Z79.51: ICD-10-CM

## 2020-02-09 LAB
ADD MANUAL DIFF: NO
ALBUMIN SERPL-MCNC: 3.7 G/DL (ref 3.5–5)
ALP SERPL-CCNC: 54 U/L (ref 38–126)
ANION GAP SERPL CALC-SCNC: 3 MMOL/L (ref 5–19)
ARTERIAL BLOOD FIO2: (no result)
ARTERIAL BLOOD H2CO3: 1.31 MMOL/L (ref 1.05–1.35)
ARTERIAL BLOOD HCO3: 24.9 MMOL/L (ref 20–24)
ARTERIAL BLOOD PCO2: 43.5 MMHG (ref 35–45)
ARTERIAL BLOOD PH: 7.38 (ref 7.35–7.45)
ARTERIAL BLOOD PO2: 56.7 MMHG (ref 80–100)
ARTERIAL BLOOD TOTAL CO2: 26.2 MMOL/L (ref 23–27)
AST SERPL-CCNC: 25 U/L (ref 17–59)
BASE EXCESS BLDA CALC-SCNC: -0.5 MMOL/L
BASOPHILS # BLD AUTO: 0.1 10^3/UL (ref 0–0.2)
BASOPHILS NFR BLD AUTO: 1.3 % (ref 0–2)
BILIRUB DIRECT SERPL-MCNC: 0 MG/DL (ref 0–0.4)
BILIRUB SERPL-MCNC: 0.1 MG/DL (ref 0.2–1.3)
BUN SERPL-MCNC: 11 MG/DL (ref 7–20)
CALCIUM: 8.6 MG/DL (ref 8.4–10.2)
CHLORIDE SERPL-SCNC: 105 MMOL/L (ref 98–107)
CK SERPL-CCNC: 293 U/L (ref 55–170)
CO2 SERPL-SCNC: 31 MMOL/L (ref 22–30)
EOSINOPHIL # BLD AUTO: 0.8 10^3/UL (ref 0–0.6)
EOSINOPHIL NFR BLD AUTO: 13.4 % (ref 0–6)
ERYTHROCYTE [DISTWIDTH] IN BLOOD BY AUTOMATED COUNT: 17.8 % (ref 11.5–14)
GLUCOSE SERPL-MCNC: 129 MG/DL (ref 75–110)
HCT VFR BLD CALC: 34.9 % (ref 37.9–51)
HGB BLD-MCNC: 11.6 G/DL (ref 13.5–17)
LYMPHOCYTES # BLD AUTO: 1.3 10^3/UL (ref 0.5–4.7)
LYMPHOCYTES NFR BLD AUTO: 22.5 % (ref 13–45)
MCH RBC QN AUTO: 26.4 PG (ref 27–33.4)
MCHC RBC AUTO-ENTMCNC: 33.2 G/DL (ref 32–36)
MCV RBC AUTO: 80 FL (ref 80–97)
MONOCYTES # BLD AUTO: 0.4 10^3/UL (ref 0.1–1.4)
MONOCYTES NFR BLD AUTO: 6.3 % (ref 3–13)
NEUTROPHILS # BLD AUTO: 3.3 10^3/UL (ref 1.7–8.2)
NEUTS SEG NFR BLD AUTO: 56.5 % (ref 42–78)
PLATELET # BLD: 282 10^3/UL (ref 150–450)
POTASSIUM SERPL-SCNC: 4.1 MMOL/L (ref 3.6–5)
PROT SERPL-MCNC: 6.3 G/DL (ref 6.3–8.2)
RBC # BLD AUTO: 4.39 10^6/UL (ref 4.35–5.55)
SAO2 % BLDA: 88.8 % (ref 94–98)
TOTAL CELLS COUNTED % (AUTO): 100 %
WBC # BLD AUTO: 5.8 10^3/UL (ref 4–10.5)

## 2020-02-09 PROCEDURE — 99291 CRITICAL CARE FIRST HOUR: CPT

## 2020-02-09 PROCEDURE — 36415 COLL VENOUS BLD VENIPUNCTURE: CPT

## 2020-02-09 PROCEDURE — 96361 HYDRATE IV INFUSION ADD-ON: CPT

## 2020-02-09 PROCEDURE — 93010 ELECTROCARDIOGRAM REPORT: CPT

## 2020-02-09 PROCEDURE — 80053 COMPREHEN METABOLIC PANEL: CPT

## 2020-02-09 PROCEDURE — 94640 AIRWAY INHALATION TREATMENT: CPT

## 2020-02-09 PROCEDURE — 99292 CRITICAL CARE ADDL 30 MIN: CPT

## 2020-02-09 PROCEDURE — 96374 THER/PROPH/DIAG INJ IV PUSH: CPT

## 2020-02-09 PROCEDURE — 71045 X-RAY EXAM CHEST 1 VIEW: CPT

## 2020-02-09 PROCEDURE — 82550 ASSAY OF CK (CPK): CPT

## 2020-02-09 PROCEDURE — 85025 COMPLETE CBC W/AUTO DIFF WBC: CPT

## 2020-02-09 PROCEDURE — 93005 ELECTROCARDIOGRAM TRACING: CPT

## 2020-02-09 PROCEDURE — 82803 BLOOD GASES ANY COMBINATION: CPT

## 2020-02-09 PROCEDURE — 84443 ASSAY THYROID STIM HORMONE: CPT

## 2020-02-09 NOTE — ER DOCUMENT REPORT
Entered by LUANA MACDONALD SCRIBE  02/09/20 2011 





Acting as scribe for:YISSEL FU IV, MD





ED General





- General


Chief Complaint: Shortness Of Breath


Stated Complaint: DIFFICULTY BREATHING


Time Seen by Provider: 02/09/20 20:07


Mode of Arrival: Medic


Information source: Emergency Med Personnel


Notes: 





This 39 year old male patient brought in by EMS presents to the ED today with 

complaints of sudden onset shortness of breath dyspnea that started around 1400-

1430 today. EMS reports that the patient received Mag Sulfate 2gm IV, Solumedrol

125mg IV, and Albuterol 10mg neb en route. Patient states that he used MDI 

without relief and that he has been intubated in the past for his asthma 

symptoms. Patient was recently discharged from here on 1/03/2020 for bilateral 

pneumonia. Patient is distracted by technology during exam.





TRAVEL OUTSIDE OF THE [place] IN LAST 30 DAYS: No





- Related Data


Allergies/Adverse Reactions: 


                                        





tramadol Allergy (Verified 01/01/20 04:56)


   











Past Medical History





- General


Information source: Patient, [facility] Records





- Social History


Smoking Status: Unknown if Ever Smoked


Cigarette use (# per day): No


Chew tobacco use (# tins/day): No


Smoking Education Provided: No


Family History: Reviewed & Not Pertinent, Hypertension


Pulmonary Medical History: Reports: Hx Asthma, Hx Bronchitis, Hx Pneumonia, Hx 

Intubation, Hx Respiratory Failure


Psychiatric Medical History: Reports: Hx Bipolar Disorder


Infectious Medical History: Reports: Hx MRSA


Past Surgical History: Reports: Hx Herniorrhaphy - Umbilical hernia, Hx 

Orthopedic Surgery - R clavicle; tibia





- Immunizations


Hx Diphtheria, Pertussis, Tetanus Vaccination: Yes


Hx Pneumococcal Vaccination: 10/01/11





Review of Systems





- Review of Systems


Constitutional: No symptoms reported


EENT: No symptoms reported


Cardiovascular: See HPI, Dyspnea


Respiratory: See HPI, Short of breath


Gastrointestinal: No symptoms reported


Genitourinary: No symptoms reported


Male Genitourinary: No symptoms reported


Musculoskeletal: No symptoms reported


Skin: No symptoms reported


Hematologic/Lymphatic: No symptoms reported


Neurological/Psychological: No symptoms reported


-: Yes All other systems reviewed and negative





Physical Exam





- Vital signs


Vitals: 


                                        











Resp


 


 20 


 


 02/09/20 19:55














- General


General appearance: Anxious, Other - Distracted by technology.





- HEENT


Head: Normocephalic, Atraumatic


Eyes: Normal


Pupils: PERRL





- Respiratory


Respiratory status: Respiratory distress - on NC


Chest status: Nontender


Breath sounds: No: Wheezing


Chest palpation: Normal





- Cardiovascular


Rhythm: Regular


Heart sounds: Normal auscultation


Murmur: No





- Abdominal


Inspection: Normal


Distension: No distension


Bowel sounds: Normal


Tenderness: Nontender


Organomegaly: No organomegaly





- Back


Back: Normal, Nontender





- Extremities


General upper extremity: Normal inspection


General lower extremity: Normal inspection





- Neurological


Neuro grossly intact: Yes





- Psychological


Associated symptoms: Anxious





- Skin


Skin Temperature: Warm


Skin Moisture: Dry


Skin Color: Normal





Course





- Vital Signs


Vital signs: 


                                        











Temp Pulse Resp BP Pulse Ox


 


 98.0 F      16   142/82 H  98 


 


 02/09/20 20:01     02/10/20 03:01  02/10/20 03:01  02/10/20 03:01














- Laboratory


Result Diagrams: 


                                 02/09/20 20:20





                                 02/09/20 20:20


Laboratory results interpreted by me: 


                                        











  02/09/20 02/09/20 02/09/20





  20:20 20:20 23:25


 


Hgb  11.6 L  


 


Hct  34.9 L  


 


MCH  26.4 L  


 


RDW  17.8 H  


 


Eos % (Auto)  13.4 H  


 


Absolute Eos (auto)  0.8 H  


 


ABG pO2    56.7 L


 


ABG HCO3    24.9 H


 


ABG O2 Saturation    88.8 L


 


Carbon Dioxide   31 H 


 


Anion Gap   3 L 


 


Glucose   129 H 


 


Total Bilirubin   0.1 L 


 


Creatine Kinase   293 H 














  02/10/20





  01:30


 


Hgb 


 


Hct 


 


MCH 


 


RDW 


 


Eos % (Auto) 


 


Absolute Eos (auto) 


 


ABG pO2 


 


ABG HCO3  24.2 H


 


ABG O2 Saturation 


 


Carbon Dioxide 


 


Anion Gap 


 


Glucose 


 


Total Bilirubin 


 


Creatine Kinase 














- Consults


  ** DR. PAUL WEILAND


Time consulted: 00:41


Reason for consultation: 





02/10/20 00:42


STATUS ASTHMATICUS





Consulted provider: will see as inpatient





Critical Care Note





- Critical Care Note


Total time excluding time spent on procedures (mins): 120





Discharge





- Discharge


Clinical Impression: 


 Hypoxia





Status asthmaticus


Qualifiers:


 Asthma severity: unspecified severity Asthma persistence: persistent Qualified 

Code(s): J45.902 - Unspecified asthma with status asthmaticus





Condition: Good


Disposition: ADMITTED AS INPATIENT


Admitting Provider: Weiland (Hospitalist)


Unit Admitted: IMCU





I personally performed the services described in the documentation, reviewed and

edited the documentation which was dictated to the scribe in my presence, and it

accurately records my words and actions.

## 2020-02-09 NOTE — RADIOLOGY REPORT (SQ)
EXAM DESCRIPTION:



[facility]: XR CHEST 1 VIEW 



CLINICAL HISTORY:

39 years  Male;  Asthma exacerbation, hypoxia;



COMPARISON: 01/09/2020



FINDINGS:

Lungs are clear, with no focal infiltrate, pneumothorax, or

pleural effusion. No pulmonary nodules.

Mediastinum is within normal limits for this positioning.

Bony structures are unremarkable.



IMPRESSION:

1.  No acute pulmonary findings.

## 2020-02-10 VITALS — SYSTOLIC BLOOD PRESSURE: 120 MMHG | DIASTOLIC BLOOD PRESSURE: 54 MMHG

## 2020-02-10 LAB
ARTERIAL BLOOD FIO2: (no result)
ARTERIAL BLOOD H2CO3: 1.3 MMOL/L (ref 1.05–1.35)
ARTERIAL BLOOD HCO3: 24.2 MMOL/L (ref 20–24)
ARTERIAL BLOOD PCO2: 43.1 MMHG (ref 35–45)
ARTERIAL BLOOD PH: 7.37 (ref 7.35–7.45)
ARTERIAL BLOOD PO2: 97.4 MMHG (ref 80–100)
ARTERIAL BLOOD TOTAL CO2: 25.6 MMOL/L (ref 23–27)
BASE EXCESS BLDA CALC-SCNC: -1.1 MMOL/L
SAO2 % BLDA: 97.2 % (ref 94–98)

## 2020-02-10 RX ADMIN — LEVALBUTEROL HYDROCHLORIDE PRN MG: 0.63 SOLUTION RESPIRATORY (INHALATION) at 04:26

## 2020-02-10 RX ADMIN — LEVALBUTEROL HYDROCHLORIDE PRN MG: 0.63 SOLUTION RESPIRATORY (INHALATION) at 02:20

## 2020-02-10 NOTE — LEFT AGAINST MEDICAL ADVICE
Against Medical Advice


Admission Date/Time: 02/10/20 02:05





 Primary Care Provider: BABAK FLORES MD








Date of Patient Emigration: 02/10/20





- Diagnosis:


(1) Acute respiratory failure with hypoxia


Is this a current diagnosis for this admission?: Yes   








(3) Polysubstance abuse


Is this a current diagnosis for this admission?: Yes   





(4) Status asthmaticus


Is this a current diagnosis for this admission?: Yes   





- Summary:


Summary: 


Please see Admission and Progress Notes as well.





CHRISTY JEFFERSON is a 39 M, who LEFT AGAINST MEDICAL ADVICE.





The Patient was admitted on 02/10/20 02:05.


I went up to see the patient this morning and we talked about his oxygen demand 

that was requiring 6 L of nasal cannula.  I told him since he was wanting to go 

home we will try him on room air and then reassess him after we see what his 

saturation drops to.





Patient however signed out AMA prior to this.  Did tell me the reason he came in

was he drove a vehicle to [place] yesterday and was so short of breath that he 

could not hardly function and felt weak.





Signed out AMA this morning

## 2020-02-10 NOTE — PDOC H&P
History of Present Illness


Admission Date/PCP: 


02/10/2020  00:30  


BABAK FLORES MD


Patient complains of: Dyspnea


History of Present Illness: 


CHRISTY JEFFERSON is a 39 year old male who presented to the ER with acute 

dyspnea.  He admits initially moderate dyspnea of sudden onset about 2 PM 

02/09/2020 the symptoms steadily increased  to very severe over the next several

hours despite use of his home inhalers.  His dyspnea was accompanied 

progressively worsening wheezing and his dyspnea worsened with exertion.  He 

denies other associated or accompanying signs or symptoms.  He admits prior 

similar episodes due to his asthma.  He has not identified any additional 

aggravating or ameliorating factors for his dyspnea.  In the ER the patient 

showed mild improvement with nebulizer treatments, IV fluids, IV magnesium 

sulfate and IV steroids. He remained in moderate respiratory distress and 

required supplemental oxygen to maintain an adequate O2 saturation. He was 

subsequently admitted for further evaluation and treatment.





Past Medical History


Cardiac Medical History: 


   Denies: Atrial Fibrillation, Congestive Heart Failure, Coronary Artery Dis

ease, DVT, Myocardial Infarction, Hyperlipidema, Hypertension


Pulmonary Medical History: Reports: Asthma, Bronchitis, Intubation, Pneumonia, 

Respiratory Failure


   Denies: Chronic Obstructive Pulmonary Disease (COPD), Sleep Apnea, 

Tuberculosis


EENT Medical History: 


   Denies: Cataracts, Ears - Hearing aids


Neurological Medical History: 


   Denies: Hemorrhagic CVA, Ischemic CVA, Migraine, Seizures


Endocrine Medical History: 


   Denies: Diabetes Mellitus Type 1, Diabetes Mellitus Type 2, Hyperthyroidism, 

Hypothyroidism, Obesity


Renal/ Medical History: 


   Denies: Chronic Kidney Disease, Nephrolithiasis


Malignancy Medical History: Reports: None


GI Medical History: 


   Denies: Cirrhosis, Crohn's Disease, Gastroesophageal Reflux Disease, 

Hepatitis, Hiatal Hernia, Peptic Ulcer Disease, Ulcerative Colitis


Musculoskeltal Medical History: 


   Denies: Arthritis, Gout


Skin Medical History: 


   Denies: Eczema, Psoriasis


Psychiatric Medical History: Reports: Bipolar Disorder, Substance Abuse


   Denies: Alcohol Dependency, Tobacco Dependency


Traumatic Medical History: Reports: None


Hematology: 


   Denies: Anemia, Bleeding Tendencies


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





Past Surgical History


Past Surgical History: Reports: Herniorrhaphy - Umbilical hernia, Orthopedic 

Surgery - R clavicle; tibia





Social History


Information Source: Patient


Lives with: Spouse/Significant other


Smoking Status: Never Smoker


Electronic Cigarette use?: No


Frequency of Alcohol Use: Occasional


Hx Recreational Drug Use: Yes


Drugs: Cocaine, Marijuana


Hx Prescription Drug Abuse: No





- Advance Directive


Resuscitation Status: Full Code


Surrogate healthcare decision maker:: 


Lizzy Jeb





Family History


Family History: Hypertension.  denies: CAD, DM, Malignancy


Parental Family History Reviewed: Yes


Children Family History Reviewed: No


Sibling(s) Family History Reviewed.: Yes





Medication/Allergy


Home Medications: 








Albuterol Sulfate [Ventolin Hfa 8 gm Mdi (1 Mdi/ER Disp)] 2 puff IH Q6HP PRN 

01/01/20 


Gabapentin [Neurontin 300 mg Capsule] 600 mg PO QHS 01/01/20 


Ipratropium/Albuterol Sulfate [Combivent Respimat 4 gm Mdi] 1 puff IH Q6HP PRN 

MDD 6 PUFFS 01/01/20 


Ipratropium/Albuterol Sulfate [Duoneb 3 ml Ampul] 3 ml NEB RTQ4HP PRN 01/01/20 


Montelukast Sodium [Singulair 10 mg Tablet] 10 mg PO QHS 01/01/20 


Levofloxacin [Levaquin 500 mg Tablet] 500 mg PO DAILY 5 Days #5 tablet 01/02/20 


Prednisone 10 mg PO DAILY 14 Days #21 tablet 01/02/20 








Allergies/Adverse Reactions: 


                                        





tramadol Allergy (Verified 01/01/20 04:56)


   











Review of Systems


Constitutional: ABSENT: chills, fever(s)


Eyes: ABSENT: visual disturbances, other - eye pain


Ears: ABSENT: hearing changes, other - ear pain


Nose, Mouth, and Throat: ABSENT: headache(s), mouth pain, sore throat


Cardiovascular: PRESENT: dyspnea on exertion.  ABSENT: chest pain, palpitations


Respiratory: PRESENT: dyspnea.  ABSENT: cough


Gastrointestinal: ABSENT: abdominal pain, constipation, diarrhea, nausea, 

vomiting


Genitourinary: ABSENT: difficulty urinating, dysuria, hematuria


Musculoskeletal: ABSENT: back pain, joint swelling, muscle weakness


Integumentary: ABSENT: pruritus, rash


Neurological: ABSENT: confusion, convulsions, focal weakness, memory loss, 

syncope


Psychiatric: ABSENT: anxiety, depression


Endocrine: ABSENT: cold intolerance, heat intolerance, polydipsia, polyphagia, 

polyuria


Hematologic/Lymphatic: ABSENT: easy bleeding, easy bruising


Allergic/Immunologic: ABSENT: seasonal rhinorrhea





Physical Exam


Vital Signs: 


                                        











Temp Pulse Resp BP Pulse Ox


 


 98.0 F      21 H  154/89 H  88 L


 


 02/09/20 20:01     02/09/20 23:01  02/09/20 23:01  02/09/20 23:01








                                 Intake & Output











 02/08/20 02/09/20 02/10/20





 23:59 23:59 23:59


 


Intake Total  1000 


 


Balance  1000 


 


Weight  84 kg 











General appearance: PRESENT: cooperative, mild distress - due to dyspnea


Head exam: PRESENT: atraumatic, normocephalic


Eye exam: PRESENT: conjunctiva pink.  ABSENT: conjunctival injection, scleral 

icterus


Ear exam: PRESENT: normal external ear exam.  ABSENT: bleeding, drainage


Mouth exam: PRESENT: dry mucosa, neck supple


Neck exam: ABSENT: thyromegaly, tracheal deviation


Respiratory exam: PRESENT: accessory muscle use, decreased breath sounds - 

minimal air movement, prolonged expiratory phas - moderately prolonged 

expiratory phase, retraction - mild chest wall and supraclaviular retractions, 

symmetrical, tachypnea, wheezes - high pitched end-expiratory wheezes


Cardiovascular exam: PRESENT: RRR, tachycardia.  ABSENT: clicks, gallop, rubs


Pulses: PRESENT: normal radial pulses, normal dorsalis pedis pul


Vascular exam: PRESENT: normal capillary refill.  ABSENT: pallor


GI/Abdominal exam: PRESENT: normal bowel sounds, soft.  ABSENT: tenderness


Rectal exam: PRESENT: deferred


Extremities exam: ABSENT: joint swelling, pedal edema


Musculoskeletal exam: ABSENT: deformity, dislocation


Neurological exam: PRESENT: alert, oriented to person, oriented to place, 

oriented to time, oriented to situation, CN II-XII grossly intact.  ABSENT: 

motor sensory deficit


Psychiatric exam: PRESENT: appropriate affect, normal mood


Skin exam: PRESENT: dry, intact, warm.  ABSENT: jaundice, rash, urticaria





Results


Laboratory Results: 


                                        





                                 02/09/20 20:20 





                                 02/09/20 20:20 





                                        











  02/09/20 02/09/20 02/09/20





  20:20 20:20 23:25


 


WBC  5.8  


 


RBC  4.39  


 


Hgb  11.6 L  


 


Hct  34.9 L  


 


MCV  80  


 


MCH  26.4 L  


 


MCHC  33.2  


 


RDW  17.8 H  


 


Plt Count  282  


 


Seg Neutrophils %  56.5  


 


Carbonic Acid    1.31


 


HCO3/H2CO3 Ratio    19:1


 


ABG pH    7.38


 


ABG pCO2    43.5


 


ABG pO2    56.7 L


 


ABG HCO3    24.9 H


 


ABG O2 Saturation    88.8 L


 


ABG Base Excess    -0.5


 


FiO2    ROOM AIR


 


Sodium   139.0 


 


Potassium   4.1 


 


Chloride   105 


 


Carbon Dioxide   31 H 


 


Anion Gap   3 L 


 


BUN   11 


 


Creatinine   0.86 


 


Est GFR ( Amer)   > 60 


 


Glucose   129 H 


 


Calcium   8.6 


 


Total Bilirubin   0.1 L 


 


AST   25 


 


Alkaline Phosphatase   54 


 


Total Protein   6.3 


 


Albumin   3.7 








                                        











  02/09/20





  20:20


 


Creatine Kinase  293 H











Impressions: 


                                        





Chest X-Ray  02/09/20 19:59


IMPRESSION:


1.  No acute pulmonary findings.


 














Assessment and Plan





- Diagnosis


(1) Asthma exacerbation


Qualifiers: 


   Asthma severity: moderate   Asthma persistence: persistent   Qualified 

Code(s): J45.41 - Moderate persistent asthma with (acute) exacerbation   


Is this a current diagnosis for this admission?: Yes   





(2) Asthma with status asthmaticus in adult


Qualifiers: 


   Asthma severity: moderate   Asthma persistence: persistent   Qualified 

Code(s): J45.42 - Moderate persistent asthma with status asthmaticus   


Is this a current diagnosis for this admission?: Yes   





(3) Acute respiratory failure with hypoxia


Is this a current diagnosis for this admission?: Yes   





(4) Polysubstance abuse


Is this a current diagnosis for this admission?: Yes   





- Plan Summary


Summary: 


The patient will be admitted to the medical floor where he will receive routine 

supportive and symptomatic cares.  He will be treated with an aggressive 

pulmonary toilet utilizing Xopenex, Pulmicort and Atrovent delivered via 

nebulizer.  He will receive IV steroids utilizing Solu-Medrol 40 mg q. 6 hours x

3 doses.  He will receive supplemental oxygen as required to maintain adequate 

oxygen saturation.  He will use morphine sulfate 2 mg IV every 1 hour PRN severe

dyspnea.  He will use lorazepam 1 mg IV every 4 hours as needed for anxiety or 

agitation.  Routine laboratory follow-up evaluations will be obtained.





- Time


Time Spent with patient: 15-24 minutes


Medications reviewed and adjusted accordingly: Yes


Anticipated discharge: Home





- Inpatient Certification


Based on my medical assessment, after consideration of the patient's 

comorbidities, presenting symptoms, or acuity I expect that the services needed 

warrant INPATIENT care.: Yes


I certify that my determination is in accordance with my understanding of 

Medicare's requirements for reasonable and necessary INPATIENT services [42 CFR 

412.3e].: Yes


Medical Necessity: Need Close Monitoring Due to Risk of Patient Decompensation, 

Need for Nebulizer Therapy and Monitoring of Response, Risk of Complication if 

Not Cared For in Hospital

## 2020-02-10 NOTE — EKG REPORT
SEVERITY:- BORDERLINE ECG -

SINUS RHYTHM

PROBABLE LEFT ATRIAL ABNORMALITY

:

Confirmed by: Yosi Chance MD 10-Feb-2020 13:27:21

## 2020-04-01 ENCOUNTER — HOSPITAL ENCOUNTER (EMERGENCY)
Dept: HOSPITAL 62 - ER | Age: 40
Discharge: HOME | End: 2020-04-01
Payer: MEDICARE

## 2020-04-01 VITALS — DIASTOLIC BLOOD PRESSURE: 90 MMHG | SYSTOLIC BLOOD PRESSURE: 137 MMHG

## 2020-04-01 DIAGNOSIS — F17.200: ICD-10-CM

## 2020-04-01 DIAGNOSIS — Z87.01: ICD-10-CM

## 2020-04-01 DIAGNOSIS — R05: ICD-10-CM

## 2020-04-01 DIAGNOSIS — R06.02: ICD-10-CM

## 2020-04-01 DIAGNOSIS — R53.81: ICD-10-CM

## 2020-04-01 DIAGNOSIS — J45.41: Primary | ICD-10-CM

## 2020-04-01 DIAGNOSIS — Z88.6: ICD-10-CM

## 2020-04-01 DIAGNOSIS — R00.2: ICD-10-CM

## 2020-04-01 PROCEDURE — 71045 X-RAY EXAM CHEST 1 VIEW: CPT

## 2020-04-01 PROCEDURE — 94640 AIRWAY INHALATION TREATMENT: CPT

## 2020-04-01 PROCEDURE — 99285 EMERGENCY DEPT VISIT HI MDM: CPT

## 2020-04-01 PROCEDURE — 93005 ELECTROCARDIOGRAM TRACING: CPT

## 2020-04-01 PROCEDURE — 96360 HYDRATION IV INFUSION INIT: CPT

## 2020-04-01 PROCEDURE — 93010 ELECTROCARDIOGRAM REPORT: CPT

## 2020-04-01 NOTE — ER DOCUMENT REPORT
ED General





- General


Chief Complaint: Respiratory Distress


Stated Complaint: DIFFICULTY BREATHING


Time Seen by Provider: 04/01/20 11:15


Primary Care Provider: 


BABAK FLORES MD [Primary Care Provider] - Follow up as needed


Mode of Arrival: Medic


Information source: Patient


TRAVEL OUTSIDE OF THE [place] IN LAST 30 DAYS: No





- HPI


Notes: 


Patient presents by EMS for shortness of breath.  He states he feels much better

now after receiving supplemental oxygen steroids and breathing treatments.  He 

states he has had no known coronavirus exposures.  He has had no known travel 

and has not been out of his house recently.  He states he has been out of 

inhalers and steroids at home.  He states he does still smoke cigarettes and ma

rijuana.  He has had no fevers.  He has had shortness of breath.  It is worse 

with exertion and better with rest.  It is been moderate to severe.  It has been

intermittent.  It is been going on for 2 to 3 days.  He has had a cough that is 

been mainly nonproductive.





- Related Data


Allergies/Adverse Reactions: 


                                        





tramadol Allergy (Verified 04/01/20 10:51)


   











Past Medical History





- General


Information source: Patient





- Social History


Smoking Status: Current Every Day Smoker


Chew tobacco use (# tins/day): No


Frequency of alcohol use: None


Drug Abuse: Marijuana


Family History: Hypertension.  denies: CAD, DM, Malignancy


Patient has suicidal ideation: No


Patient has homicidal ideation: No





- Past Medical History


Cardiac Medical History: 


   Denies: Hx Atrial Fibrillation, Hx Congestive Heart Failure, Hx Coronary 

Artery Disease, Hx DVT, Hx Heart Attack, Hx Hypercholesterolemia, Hx 

Hypertension


Pulmonary Medical History: Reports: Hx Asthma, Hx Bronchitis, Hx Pneumonia, Hx 

Intubation, Hx Respiratory Failure


   Denies: Hx COPD, Hx Sleep Apnea, Hx Tuberculosis


Neurological Medical History: Denies: Hx Migraine, Hx Seizures


Endocrine Medical History: Denies: Hx Diabetes Mellitus Type 1, Hx Diabetes 

Mellitus Type 2, Hx Hyperthyroidism, Hx Hypothyroidism


GI Medical History: Denies: Hx Cirrhosis, Hx Crohn's Disease, Hx 

Gastroesophageal Reflux Disease, Hx Hepatitis, Hx Hiatal Hernia, Hx Ulcerative 

Colitis


Musculoskeletal Medical History: Denies Hx Arthritis, Denies Hx Gout


Skin Medical History: Denies Hx Eczema, Denies Hx Psoriasis


Psychiatric Medical History: Reports: Hx Bipolar Disorder


   Denies: Hx Depression


Infectious Medical History: Reports: Hx MRSA.  Denies: Hx Hepatitis


Past Surgical History: Reports: Hx Abdominal Surgery - umbilical hernia repair, 

Hx Herniorrhaphy - Umbilical hernia, Hx Orthopedic Surgery - R clavicle; tibia





- Immunizations


Hx Diphtheria, Pertussis, Tetanus Vaccination: Yes


Hx Pneumococcal Vaccination: 10/01/11





Review of Systems





- Review of Systems


Constitutional: Malaise.  denies: Chills, Fever


Cardiovascular: Palpitations.  denies: Chest pain


Respiratory: Cough, Short of breath


-: Yes All other systems reviewed and negative





Physical Exam





- Vital signs


Vitals: 


                                        











Resp Pulse Ox


 


 15   97 


 


 04/01/20 10:59  04/01/20 10:59











Interpretation: Normal





- General


General appearance: Appears well, Alert





- HEENT


Head: Normocephalic, Atraumatic


Eyes: Normal


Pupils: PERRL





- Respiratory


Respiratory status: No respiratory distress


Chest status: Nontender


Breath sounds: Wheezing


Chest palpation: Normal





- Cardiovascular


Rhythm: Regular


Heart sounds: Normal auscultation


Murmur: No





- Abdominal


Inspection: Normal


Distension: No distension


Bowel sounds: Normal


Tenderness: Nontender


Organomegaly: No organomegaly





- Back


Back: Normal, Nontender





- Extremities


General upper extremity: Normal inspection, Nontender, Normal color, Normal ROM,

Normal temperature


General lower extremity: Normal inspection, Nontender, Normal color, Normal ROM,

Normal temperature, Normal weight bearing.  No: Nandini's sign





- Neurological


Neuro grossly intact: Yes


Cognition: Normal


Orientation: AAOx4


[place] Coma Scale Eye Opening: Spontaneous


[place] Coma Scale Verbal: Oriented


[place] Coma Scale Motor: Obeys Commands


[place] Coma Scale Total: 15


Speech: Normal


Motor strength normal: LUE, RUE, LLE, RLE


Sensory: Normal





- Psychological


Associated symptoms: Normal affect, Normal mood





- Skin


Skin Temperature: Warm


Skin Moisture: Dry


Skin Color: Normal





Course





- Re-evaluation


Re-evalutation: 





04/01/20 13:48


Patient has been in the emergency department for approximately 3 hours.  Heart 

rate is now 89.  Oxygen saturation is 96% on room air.  Blood pressure is 

124/84.  He has no coronavirus risk factors.  I am going to send the patient 

home with steroids and a prescription for an inhaler.  I am going to recommend 

that he practices self-isolation for the next 14 days given his history of cough

and shortness of breath.  He would I feel this is definitely most likely due to 

asthma in the area of this pandemic it seems most prudent to have him self 

isolate.





- Vital Signs


Vital signs: 


                                        











Temp Pulse Resp BP Pulse Ox


 


       17   124/84   97 


 


       04/01/20 13:01  04/01/20 13:00  04/01/20 13:01














- Diagnostic Test


Radiology reviewed: Image reviewed, Reports reviewed





- EKG Interpretation by Me


EKG shows normal: Sinus rhythm


Rate: Normal - 88


Rhythm: NSR


Axis/QRS: No: Right axis deviation, Left axis deviation





Discharge





- Discharge


Clinical Impression: 


Acute asthma exacerbation


Qualifiers:


 Asthma severity: moderate Asthma persistence: persistent Qualified Code(s): 

J45.41 - Moderate persistent asthma with (acute) exacerbation





Condition: Stable


Disposition: HOME, SELF-CARE


Instructions:  Asthma (Critical access hospital)


Prescriptions: 


Prednisone [Deltasone 20 mg Tablet] 60 mg PO DAILY 5 Days  tablet


Albuterol Sulfate [Ventolin Hfa 8 gm Mdi (1 Mdi/ER Disp)] 2 puff IH ASDIR PRN #1

inhaler


 PRN Reason: 


Referrals: 


BABAK FLORES MD [Primary Care Provider] - Follow up tomorrow

## 2020-04-01 NOTE — RADIOLOGY REPORT (SQ)
EXAM DESCRIPTION:  CHEST SINGLE VIEW



IMAGES COMPLETED DATE/TIME:  4/1/2020 1:53 pm



REASON FOR STUDY:  sob



COMPARISON:  None.



EXAM PARAMETERS:  NUMBER OF VIEWS: One view.

TECHNIQUE: Single frontal radiographic view of the chest acquired.

RADIATION DOSE: NA

LIMITATIONS: None.



FINDINGS:  LUNGS AND PLEURA: Minimal right mid lung linear opacity, likely scarring.  No focal consol
idation, pleural effusion or pneumothorax.

MEDIASTINUM AND HILAR STRUCTURES: No masses.  Contour normal.

HEART AND VASCULAR STRUCTURES: Heart normal in size.  Normal vasculature.

BONES: No acute findings.

HARDWARE: None in the chest.

OTHER: No other significant finding.



IMPRESSION:  No evidence of acute cardiopulmonary process.  Minimal right mid lung scarring.



TECHNICAL DOCUMENTATION:  JOB ID:  [id number]

 2011 [employer]- All Rights Reserved



Reading location - IP/workstation name: [email or web address]

## 2020-04-02 NOTE — EKG REPORT
SEVERITY:- NORMAL ECG -

SINUS RHYTHM

ST ELEV, PROBABLE NORMAL EARLY REPOL PATTERN

:

Confirmed by: Nakul Pizano 02-Apr-2020 09:29:53

## 2020-09-22 ENCOUNTER — HOSPITAL ENCOUNTER (INPATIENT)
Dept: HOSPITAL 62 - ER | Age: 40
LOS: 2 days | Discharge: LEFT BEFORE DISCHARGE | DRG: 202 | End: 2020-09-24
Attending: INTERNAL MEDICINE | Admitting: INTERNAL MEDICINE
Payer: MEDICARE

## 2020-09-22 DIAGNOSIS — J96.01: ICD-10-CM

## 2020-09-22 DIAGNOSIS — J45.51: Primary | ICD-10-CM

## 2020-09-22 DIAGNOSIS — F31.9: ICD-10-CM

## 2020-09-22 DIAGNOSIS — Z88.6: ICD-10-CM

## 2020-09-22 DIAGNOSIS — F12.10: ICD-10-CM

## 2020-09-22 DIAGNOSIS — Z79.52: ICD-10-CM

## 2020-09-22 DIAGNOSIS — B95.62: ICD-10-CM

## 2020-09-22 DIAGNOSIS — F14.10: ICD-10-CM

## 2020-09-22 DIAGNOSIS — Z79.51: ICD-10-CM

## 2020-09-22 DIAGNOSIS — Z03.818: ICD-10-CM

## 2020-09-22 DIAGNOSIS — J96.02: ICD-10-CM

## 2020-09-22 LAB
%HYPO/RBC NFR BLD AUTO: SLIGHT %
ABSOLUTE LYMPHOCYTES# (MANUAL): 1.1 10^3/UL (ref 0.5–4.7)
ABSOLUTE MONOCYTES # (MANUAL): 0.1 10^3/UL (ref 0.1–1.4)
ADD MANUAL DIFF: YES
ALBUMIN SERPL-MCNC: 3.9 G/DL (ref 3.5–5)
ALP SERPL-CCNC: 71 U/L (ref 38–126)
ANION GAP SERPL CALC-SCNC: 7 MMOL/L (ref 5–19)
ANISOCYTOSIS BLD QL SMEAR: (no result)
APPEARANCE UR: CLEAR
APTT PPP: YELLOW S
ARTERIAL BLOOD FIO2: (no result)
ARTERIAL BLOOD H2CO3: 1.53 MMOL/L (ref 1.05–1.35)
ARTERIAL BLOOD HCO3: 29.1 MMOL/L (ref 20–24)
ARTERIAL BLOOD PCO2: 50.7 MMHG (ref 35–45)
ARTERIAL BLOOD PH: 7.38 (ref 7.35–7.45)
ARTERIAL BLOOD PO2: 75.6 MMHG (ref 80–100)
ARTERIAL BLOOD TOTAL CO2: 30.7 MMOL/L (ref 23–27)
AST SERPL-CCNC: 20 U/L (ref 17–59)
BARBITURATES UR QL SCN: NEGATIVE
BASE EXCESS BLDA CALC-SCNC: 3 MMOL/L
BASOPHILS NFR BLD MANUAL: 0 % (ref 0–2)
BILIRUB DIRECT SERPL-MCNC: 0.3 MG/DL (ref 0–0.4)
BILIRUB SERPL-MCNC: 0.5 MG/DL (ref 0.2–1.3)
BILIRUB UR QL STRIP: NEGATIVE
BUN SERPL-MCNC: 31 MG/DL (ref 7–20)
CALCIUM: 9.1 MG/DL (ref 8.4–10.2)
CHLORIDE SERPL-SCNC: 103 MMOL/L (ref 98–107)
CK SERPL-CCNC: 122 U/L (ref 55–170)
CO2 SERPL-SCNC: 30 MMOL/L (ref 22–30)
EOSINOPHIL NFR BLD MANUAL: 0 % (ref 0–6)
ERYTHROCYTE [DISTWIDTH] IN BLOOD BY AUTOMATED COUNT: 15 % (ref 11.5–14)
GLUCOSE SERPL-MCNC: 150 MG/DL (ref 75–110)
GLUCOSE UR STRIP-MCNC: NEGATIVE MG/DL
HCT VFR BLD CALC: 37.1 % (ref 37.9–51)
HGB BLD-MCNC: 12.2 G/DL (ref 13.5–17)
KETONES UR STRIP-MCNC: NEGATIVE MG/DL
MCH RBC QN AUTO: 26.4 PG (ref 27–33.4)
MCHC RBC AUTO-ENTMCNC: 33 G/DL (ref 32–36)
MCV RBC AUTO: 80 FL (ref 80–97)
METHADONE UR QL SCN: NEGATIVE
MONOCYTES % (MANUAL): 1 % (ref 3–13)
NITRITE UR QL STRIP: NEGATIVE
OVALOCYTES BLD QL SMEAR: SLIGHT
PCP UR QL SCN: NEGATIVE
PH UR STRIP: 5 [PH] (ref 5–9)
PLATELET # BLD: 242 10^3/UL (ref 150–450)
PLATELET COMMENT: ADEQUATE
POIKILOCYTOSIS BLD QL SMEAR: SLIGHT
POTASSIUM SERPL-SCNC: 4.6 MMOL/L (ref 3.6–5)
PROT SERPL-MCNC: 6.5 G/DL (ref 6.3–8.2)
PROT UR STRIP-MCNC: NEGATIVE MG/DL
RBC # BLD AUTO: 4.63 10^6/UL (ref 4.35–5.55)
SAO2 % BLDA: 94.7 % (ref 94–98)
SEGMENTED NEUTROPHILS % (MAN): 90 % (ref 42–78)
SP GR UR STRIP: 1.03
TOTAL CELLS COUNTED BLD: 100
URINE AMPHETAMINES SCREEN: NEGATIVE
URINE BENZODIAZEPINES SCREEN: (no result)
URINE COCAINE SCREEN: NEGATIVE
URINE MARIJUANA (THC) SCREEN: NEGATIVE
UROBILINOGEN UR-MCNC: NEGATIVE MG/DL (ref ?–2)
VARIANT LYMPHS NFR BLD MANUAL: 9 % (ref 13–45)
WBC # BLD AUTO: 12 10^3/UL (ref 4–10.5)

## 2020-09-22 PROCEDURE — 85610 PROTHROMBIN TIME: CPT

## 2020-09-22 PROCEDURE — 84484 ASSAY OF TROPONIN QUANT: CPT

## 2020-09-22 PROCEDURE — 80307 DRUG TEST PRSMV CHEM ANLYZR: CPT

## 2020-09-22 PROCEDURE — 87205 SMEAR GRAM STAIN: CPT

## 2020-09-22 PROCEDURE — 80053 COMPREHEN METABOLIC PANEL: CPT

## 2020-09-22 PROCEDURE — 83880 ASSAY OF NATRIURETIC PEPTIDE: CPT

## 2020-09-22 PROCEDURE — 83690 ASSAY OF LIPASE: CPT

## 2020-09-22 PROCEDURE — S0028 INJECTION, FAMOTIDINE, 20 MG: HCPCS

## 2020-09-22 PROCEDURE — 93010 ELECTROCARDIOGRAM REPORT: CPT

## 2020-09-22 PROCEDURE — 94640 AIRWAY INHALATION TREATMENT: CPT

## 2020-09-22 PROCEDURE — 81001 URINALYSIS AUTO W/SCOPE: CPT

## 2020-09-22 PROCEDURE — 71045 X-RAY EXAM CHEST 1 VIEW: CPT

## 2020-09-22 PROCEDURE — 99285 EMERGENCY DEPT VISIT HI MDM: CPT

## 2020-09-22 PROCEDURE — C9803 HOPD COVID-19 SPEC COLLECT: HCPCS

## 2020-09-22 PROCEDURE — 93005 ELECTROCARDIOGRAM TRACING: CPT

## 2020-09-22 PROCEDURE — 96361 HYDRATE IV INFUSION ADD-ON: CPT

## 2020-09-22 PROCEDURE — 96365 THER/PROPH/DIAG IV INF INIT: CPT

## 2020-09-22 PROCEDURE — 84443 ASSAY THYROID STIM HORMONE: CPT

## 2020-09-22 PROCEDURE — 87635 SARS-COV-2 COVID-19 AMP PRB: CPT

## 2020-09-22 PROCEDURE — 85025 COMPLETE CBC W/AUTO DIFF WBC: CPT

## 2020-09-22 PROCEDURE — 83036 HEMOGLOBIN GLYCOSYLATED A1C: CPT

## 2020-09-22 PROCEDURE — 87070 CULTURE OTHR SPECIMN AEROBIC: CPT

## 2020-09-22 PROCEDURE — 82550 ASSAY OF CK (CPK): CPT

## 2020-09-22 PROCEDURE — 36415 COLL VENOUS BLD VENIPUNCTURE: CPT

## 2020-09-22 PROCEDURE — 82803 BLOOD GASES ANY COMBINATION: CPT

## 2020-09-22 PROCEDURE — 94660 CPAP INITIATION&MGMT: CPT

## 2020-09-22 PROCEDURE — 5A09457 ASSISTANCE WITH RESPIRATORY VENTILATION, 24-96 CONSECUTIVE HOURS, CONTINUOUS POSITIVE AIRWAY PRESSURE: ICD-10-PCS | Performed by: INTERNAL MEDICINE

## 2020-09-22 PROCEDURE — 83735 ASSAY OF MAGNESIUM: CPT

## 2020-09-22 PROCEDURE — 87040 BLOOD CULTURE FOR BACTERIA: CPT

## 2020-09-22 PROCEDURE — 87077 CULTURE AEROBIC IDENTIFY: CPT

## 2020-09-22 RX ADMIN — SODIUM CHLORIDE PRN MLS/HR: 9 INJECTION, SOLUTION INTRAVENOUS at 17:34

## 2020-09-22 RX ADMIN — MAGNESIUM SULFATE IN DEXTROSE SCH MLS/HR: 10 INJECTION, SOLUTION INTRAVENOUS at 07:06

## 2020-09-22 RX ADMIN — Medication SCH: at 23:32

## 2020-09-22 RX ADMIN — DOCUSATE SODIUM SCH: 100 CAPSULE, LIQUID FILLED ORAL at 18:31

## 2020-09-22 RX ADMIN — MAGNESIUM SULFATE IN DEXTROSE SCH MLS/HR: 10 INJECTION, SOLUTION INTRAVENOUS at 06:36

## 2020-09-22 RX ADMIN — OXYCODONE AND ACETAMINOPHEN PRN TAB: 5; 325 TABLET ORAL at 17:05

## 2020-09-22 RX ADMIN — LORAZEPAM PRN MG: 2 INJECTION INTRAMUSCULAR; INTRAVENOUS at 21:50

## 2020-09-22 RX ADMIN — LORAZEPAM PRN MG: 2 INJECTION INTRAMUSCULAR; INTRAVENOUS at 11:39

## 2020-09-22 RX ADMIN — METHYLPREDNISOLONE SODIUM SUCCINATE SCH MG: 125 INJECTION, POWDER, FOR SOLUTION INTRAMUSCULAR; INTRAVENOUS at 13:58

## 2020-09-22 RX ADMIN — Medication SCH: at 13:54

## 2020-09-22 RX ADMIN — MAGNESIUM SULFATE IN DEXTROSE SCH: 10 INJECTION, SOLUTION INTRAVENOUS at 13:54

## 2020-09-22 RX ADMIN — FAMOTIDINE SCH MG: 10 INJECTION INTRAVENOUS at 21:50

## 2020-09-22 RX ADMIN — MONTELUKAST SODIUM SCH MG: 10 TABLET, FILM COATED ORAL at 17:05

## 2020-09-22 RX ADMIN — METHYLPREDNISOLONE SODIUM SUCCINATE SCH MG: 125 INJECTION, POWDER, FOR SOLUTION INTRAMUSCULAR; INTRAVENOUS at 21:50

## 2020-09-22 NOTE — PDOC H&P
History of Present Illness


Admission Date/PCP: 


  09/22/20 10:10





  BABAK FLORES MD





Patient complains of: Came in with complaints of difficulty in breathing.


History of Present Illness: 


CHRISTY JEFFERSON is a 39 year old male from bronchial asthma ran out of the 

edications came in with complaints of shortness of breath for the last few days.

 He ran out of the medications.  In the emergency room he was found to be 

tachypneic tachycardic and pulse oxes are in the 80s.  He was placed on BiPAP.  

Chest x-ray was within normal limits.  Patient was given a albuterol 

nebulizations, IV magnesium medical consult was called for admission.  Plan is 

to check for COVID-19 at this time.








Past Medical History


Cardiac Medical History: 


   Denies: Atrial Fibrillation, Congestive Heart Failure, Coronary Artery 

Disease, DVT, Myocardial Infarction, Hyperlipidema, Hypertension


Pulmonary Medical History: Reports: Asthma, Bronchitis, Intubation, Pneumonia, 

Respiratory Failure


   Denies: Chronic Obstructive Pulmonary Disease (COPD), Sleep Apnea, 

Tuberculosis


Neurological Medical History: 


   Denies: Migraine, Seizures


Endocrine Medical History: 


   Denies: Diabetes Mellitus Type 1, Diabetes Mellitus Type 2, Hyperthyroidism, 

Hypothyroidism


GI Medical History: 


   Denies: Cirrhosis, Crohn's Disease, Gastroesophageal Reflux Disease, Hepat

itis, Hiatal Hernia, Ulcerative Colitis


Musculoskeltal Medical History: 


   Denies: Arthritis, Gout


Skin Medical History: 


   Denies: Eczema, Psoriasis


Psychiatric Medical History: Reports: Bipolar Disorder


   Denies: Depression


Hematology: 


   Denies: Anemia, Bleeding Tendencies


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





Past Surgical History


Past Surgical History: Reports: Herniorrhaphy - Umbilical hernia, Orthopedic 

Surgery - R clavicle; tibia





Social History


Smoking Status: Never Smoker


Electronic Cigarette use?: No


Frequency of Alcohol Use: Occasional


Hx Recreational Drug Use: Yes


Drugs: Cocaine, Marijuana


Hx Prescription Drug Abuse: No





- Advance Directive


Resuscitation Status: Full Code





Family History


Family History: Reviewed & Not Pertinent, Hypertension


Parental Family History Reviewed: Yes - Hypertension


Children Family History Reviewed: Yes


Sibling(s) Family History Reviewed.: Yes





Medication/Allergy


Home Medications: 








Albuterol Sulfate [Ventolin Hfa 8 gm Mdi (1 Mdi/ER Disp)] 2 puff IH ASDIR PRN #1

inhaler 04/01/20 


Prednisone [Deltasone 20 mg Tablet] 60 mg PO DAILY 5 Days  tablet 04/01/20 








Allergies/Adverse Reactions: 


                                        





tramadol Allergy (Verified 04/01/20 10:51)


   











Review of Systems


Constitutional: PRESENT: fatigue, weakness.  ABSENT: fever(s), headache(s), 

night sweats


Eyes: ABSENT: visual disturbances


Ears: ABSENT: hearing changes


Nose, Mouth, and Throat: ABSENT: sore throat


Cardiovascular: PRESENT: dyspnea on exertion, palpitations.  ABSENT: orthropnea


Respiratory: PRESENT: cough, dyspnea, other - Coughing up yellow sputum.


Integumentary: ABSENT: rash, wounds


Neurological: ABSENT: abnormal gait, abnormal speech, confusion, dizziness, 

focal weakness, syncope


Psychiatric: ABSENT: anxiety, depression, homidical ideation, suicidal ideation





Physical Exam


Vital Signs: 


                                        











Temp Pulse Resp BP Pulse Ox


 


 98.0 F      22 H  127/87 H  91 L


 


 09/22/20 06:25     09/22/20 10:15  09/22/20 10:15  09/22/20 10:15








                                 Intake & Output











 09/21/20 09/22/20 09/23/20





 06:59 06:59 06:59


 


Intake Total   1200


 


Balance   1200


 


Weight  95.254 kg 











General appearance: PRESENT: well-developed, other - Moderate distress.  On 

BiPAP.


Eye exam: PRESENT: conjunctival injection, PERRLA


Mouth exam: PRESENT: moist, tongue midline


Teeth exam: PRESENT: poor dentation


Neck exam: ABSENT: carotid bruit, JVD, lymphadenopathy, thyromegaly


Respiratory exam: PRESENT: accessory muscle use, decreased breath sounds, 

wheezes


Cardiovascular exam: PRESENT: tachycardia


GI/Abdominal exam: PRESENT: normal bowel sounds, soft.  ABSENT: distended, 

guarding, mass, organolmegaly, rebound, tenderness


Rectal exam: PRESENT: deferred


Extremities exam: PRESENT: full ROM.  ABSENT: calf tenderness, clubbing, pedal 

edema


Neurological exam: PRESENT: alert, awake, oriented to person, oriented to place,

oriented to time, oriented to situation, CN II-XII grossly intact.  ABSENT: 

motor sensory deficit


Psychiatric exam: PRESENT: appropriate affect, normal mood.  ABSENT: homicidal 

ideation, suicidal ideation





Results


Laboratory Results: 


                                        





                                 09/22/20 06:43 





                                 09/22/20 06:43 





                                        











  09/22/20 09/22/20 09/22/20





  06:43 06:43 06:55


 


WBC  12.0 H  


 


RBC  4.63  


 


Hgb  12.2 L  


 


Hct  37.1 L  


 


MCV  80  


 


MCH  26.4 L  


 


MCHC  33.0  


 


RDW  15.0 H  


 


Plt Count  242  


 


Seg Neutrophils %  Not Reportable  


 


Carbonic Acid    1.53 H


 


HCO3/H2CO3 Ratio    19:1


 


ABG pH    7.38


 


ABG pCO2    50.7 H


 


ABG pO2    75.6 L


 


ABG HCO3    29.1 H


 


ABG O2 Saturation    94.7


 


ABG Base Excess    3.0


 


FiO2    45%


 


Sodium   140.1 


 


Potassium   4.6 


 


Chloride   103 


 


Carbon Dioxide   30 


 


Anion Gap   7 


 


BUN   31 H 


 


Creatinine   1.13 


 


Est GFR ( Amer)   > 60 


 


Glucose   150 H 


 


Calcium   9.1 


 


Magnesium   3.0 H 


 


Total Bilirubin   0.5 


 


AST   20 


 


Alkaline Phosphatase   71 


 


Total Protein   6.5 


 


Albumin   3.9 








                                        











  09/22/20 09/22/20





  06:43 06:43


 


Creatine Kinase  122 


 


Troponin I   < 0.012











Impressions: 


                                        





Chest X-Ray  09/22/20 06:26


IMPRESSION: 


 


Clear lungs.


 














Assessment and Plan





- Diagnosis


(1) Acute severe exacerbation of asthma


Is this a current diagnosis for this admission?: Yes   


Plan: 


9/22/2020-patient is going to be admitted to [facility] for acute exacerbation of 

asthma.  Will be admitted as inpatient.  To continue DuoNeb nebulizations.  To 

provide BiPAP support.  Started on IV Rocephin 1 g daily.  COVID-19 will be 

tested.  Patient was given IV magnesium in the emergency room.  Plan is to check

the ABG on daily basis.  On IV Ativan 1 mg every 4 hours as needed for 

agitation.  GI prophylaxis DVT prophylaxis initiated. 








(2) Acute respiratory failure with hypoxia


Is this a current diagnosis for this admission?: Yes   


Plan: 


9/22/2020-patient came in with acute respiratory with hypoxia requiring BiPAP.  

Most likely secondary to acute asthma exacerbation.  His he also indicates 

hypercapnia.








- Time


Anticipated Discharge Disposition: Home, Self Care


Anticipated Discharge Timeframe: within 48 hours

## 2020-09-22 NOTE — RADIOLOGY REPORT (SQ)
CLINICAL HISTORY:  status asthma 



COMPARISON: 4/1/2020.



TECHNIQUE: XR CHEST 1 VIEW 9/22/2020 6:26 AM CDT



FINDINGS: 



Cardiac silhouette is normal in size. Lungs are clear without

consolidation, atelectasis, mass or edema. There is no pleural

effusion. There is no pneumothorax. There are no acute osseous

findings.



IMPRESSION: 



Clear lungs.

## 2020-09-23 LAB
%HYPO/RBC NFR BLD AUTO: SLIGHT %
ABSOLUTE LYMPHOCYTES# (MANUAL): 0.4 10^3/UL (ref 0.5–4.7)
ABSOLUTE MONOCYTES # (MANUAL): 0.4 10^3/UL (ref 0.1–1.4)
ADD MANUAL DIFF: YES
ALBUMIN SERPL-MCNC: 3.4 G/DL (ref 3.5–5)
ALP SERPL-CCNC: 58 U/L (ref 38–126)
ANION GAP SERPL CALC-SCNC: 7 MMOL/L (ref 5–19)
ANISOCYTOSIS BLD QL SMEAR: SLIGHT
ARTERIAL BLOOD FIO2: (no result)
ARTERIAL BLOOD H2CO3: 1.63 MMOL/L (ref 1.05–1.35)
ARTERIAL BLOOD HCO3: 29.5 MMOL/L (ref 20–24)
ARTERIAL BLOOD PCO2: 54.1 MMHG (ref 35–45)
ARTERIAL BLOOD PH: 7.35 (ref 7.35–7.45)
ARTERIAL BLOOD PO2: 71 MMHG (ref 80–100)
ARTERIAL BLOOD TOTAL CO2: 31.1 MMOL/L (ref 23–27)
AST SERPL-CCNC: 17 U/L (ref 17–59)
BASE EXCESS BLDA CALC-SCNC: 2.9 MMOL/L
BASOPHILS NFR BLD MANUAL: 0 % (ref 0–2)
BILIRUB DIRECT SERPL-MCNC: 0.3 MG/DL (ref 0–0.4)
BILIRUB SERPL-MCNC: 0.5 MG/DL (ref 0.2–1.3)
BUN SERPL-MCNC: 33 MG/DL (ref 7–20)
CALCIUM: 8.7 MG/DL (ref 8.4–10.2)
CHLORIDE SERPL-SCNC: 104 MMOL/L (ref 98–107)
CO2 SERPL-SCNC: 29 MMOL/L (ref 22–30)
EOSINOPHIL NFR BLD MANUAL: 0 % (ref 0–6)
ERYTHROCYTE [DISTWIDTH] IN BLOOD BY AUTOMATED COUNT: 14.9 % (ref 11.5–14)
GLUCOSE SERPL-MCNC: 137 MG/DL (ref 75–110)
HCT VFR BLD CALC: 34.6 % (ref 37.9–51)
HGB BLD-MCNC: 11.4 G/DL (ref 13.5–17)
INR PPP: 1.04
MCH RBC QN AUTO: 26.1 PG (ref 27–33.4)
MCHC RBC AUTO-ENTMCNC: 32.8 G/DL (ref 32–36)
MCV RBC AUTO: 80 FL (ref 80–97)
MONOCYTES % (MANUAL): 3 % (ref 3–13)
OVALOCYTES BLD QL SMEAR: SLIGHT
PLATELET # BLD: 234 10^3/UL (ref 150–450)
PLATELET COMMENT: ADEQUATE
POIKILOCYTOSIS BLD QL SMEAR: SLIGHT
POTASSIUM SERPL-SCNC: 5.1 MMOL/L (ref 3.6–5)
PROT SERPL-MCNC: 5.9 G/DL (ref 6.3–8.2)
PROTHROMBIN TIME: 13.8 SEC (ref 11.4–15.4)
RBC # BLD AUTO: 4.34 10^6/UL (ref 4.35–5.55)
SAO2 % BLDA: 93.3 % (ref 94–98)
SEGMENTED NEUTROPHILS % (MAN): 94 % (ref 42–78)
TOTAL CELLS COUNTED BLD: 100
VARIANT LYMPHS NFR BLD MANUAL: 3 % (ref 13–45)
WBC # BLD AUTO: 13.9 10^3/UL (ref 4–10.5)

## 2020-09-23 RX ADMIN — DOCUSATE SODIUM SCH MG: 100 CAPSULE, LIQUID FILLED ORAL at 10:18

## 2020-09-23 RX ADMIN — METHYLPREDNISOLONE SODIUM SUCCINATE SCH MG: 125 INJECTION, POWDER, FOR SOLUTION INTRAMUSCULAR; INTRAVENOUS at 22:05

## 2020-09-23 RX ADMIN — MONTELUKAST SODIUM SCH: 10 TABLET, FILM COATED ORAL at 19:35

## 2020-09-23 RX ADMIN — METHYLPREDNISOLONE SODIUM SUCCINATE SCH MG: 125 INJECTION, POWDER, FOR SOLUTION INTRAMUSCULAR; INTRAVENOUS at 14:38

## 2020-09-23 RX ADMIN — FLUTICASONE FUROATE AND VILANTEROL TRIFENATATE SCH INH: 200; 25 POWDER RESPIRATORY (INHALATION) at 10:22

## 2020-09-23 RX ADMIN — OXYCODONE AND ACETAMINOPHEN PRN TAB: 5; 325 TABLET ORAL at 23:28

## 2020-09-23 RX ADMIN — Medication SCH ML: at 06:14

## 2020-09-23 RX ADMIN — UMECLIDINIUM SCH INH: 62.5 AEROSOL, POWDER ORAL at 10:22

## 2020-09-23 RX ADMIN — FAMOTIDINE SCH MG: 10 INJECTION INTRAVENOUS at 10:18

## 2020-09-23 RX ADMIN — DOCUSATE SODIUM SCH: 100 CAPSULE, LIQUID FILLED ORAL at 19:34

## 2020-09-23 RX ADMIN — IPRATROPIUM BROMIDE AND ALBUTEROL SULFATE SCH: 2.5; .5 SOLUTION RESPIRATORY (INHALATION) at 11:02

## 2020-09-23 RX ADMIN — Medication SCH ML: at 22:06

## 2020-09-23 RX ADMIN — SODIUM CHLORIDE PRN MLS/HR: 9 INJECTION, SOLUTION INTRAVENOUS at 17:40

## 2020-09-23 RX ADMIN — IPRATROPIUM BROMIDE AND ALBUTEROL SULFATE SCH: 2.5; .5 SOLUTION RESPIRATORY (INHALATION) at 06:08

## 2020-09-23 RX ADMIN — LORAZEPAM PRN MG: 2 INJECTION INTRAMUSCULAR; INTRAVENOUS at 03:34

## 2020-09-23 RX ADMIN — SODIUM CHLORIDE PRN MLS/HR: 9 INJECTION, SOLUTION INTRAVENOUS at 06:15

## 2020-09-23 RX ADMIN — CETIRIZINE HYDROCHLORIDE SCH MG: 10 TABLET, FILM COATED ORAL at 10:18

## 2020-09-23 RX ADMIN — CEFTRIAXONE SCH MLS/HR: 1 INJECTION, SOLUTION INTRAVENOUS at 10:21

## 2020-09-23 RX ADMIN — METHYLPREDNISOLONE SODIUM SUCCINATE SCH MG: 125 INJECTION, POWDER, FOR SOLUTION INTRAMUSCULAR; INTRAVENOUS at 06:10

## 2020-09-23 RX ADMIN — Medication SCH ML: at 14:39

## 2020-09-23 RX ADMIN — IPRATROPIUM BROMIDE AND ALBUTEROL SULFATE SCH ML: 2.5; .5 SOLUTION RESPIRATORY (INHALATION) at 14:37

## 2020-09-23 RX ADMIN — FAMOTIDINE SCH MG: 10 INJECTION INTRAVENOUS at 22:06

## 2020-09-23 RX ADMIN — ENOXAPARIN SODIUM SCH MG: 40 INJECTION SUBCUTANEOUS at 10:18

## 2020-09-24 VITALS — DIASTOLIC BLOOD PRESSURE: 60 MMHG | SYSTOLIC BLOOD PRESSURE: 130 MMHG

## 2020-09-24 LAB
ABSOLUTE LYMPHOCYTES# (MANUAL): 0.3 10^3/UL (ref 0.5–4.7)
ABSOLUTE MONOCYTES # (MANUAL): 0.3 10^3/UL (ref 0.1–1.4)
ADD MANUAL DIFF: YES
ALBUMIN SERPL-MCNC: 3.3 G/DL (ref 3.5–5)
ALP SERPL-CCNC: 54 U/L (ref 38–126)
ANION GAP SERPL CALC-SCNC: 8 MMOL/L (ref 5–19)
ANISOCYTOSIS BLD QL SMEAR: SLIGHT
AST SERPL-CCNC: 20 U/L (ref 17–59)
BASOPHILS NFR BLD MANUAL: 0 % (ref 0–2)
BILIRUB DIRECT SERPL-MCNC: 0.3 MG/DL (ref 0–0.4)
BILIRUB SERPL-MCNC: 0.3 MG/DL (ref 0.2–1.3)
BUN SERPL-MCNC: 39 MG/DL (ref 7–20)
CALCIUM: 8.7 MG/DL (ref 8.4–10.2)
CHLORIDE SERPL-SCNC: 103 MMOL/L (ref 98–107)
CO2 SERPL-SCNC: 29 MMOL/L (ref 22–30)
EOSINOPHIL NFR BLD MANUAL: 0 % (ref 0–6)
ERYTHROCYTE [DISTWIDTH] IN BLOOD BY AUTOMATED COUNT: 15 % (ref 11.5–14)
GLUCOSE SERPL-MCNC: 138 MG/DL (ref 75–110)
HCT VFR BLD CALC: 35.7 % (ref 37.9–51)
HGB BLD-MCNC: 11.4 G/DL (ref 13.5–17)
MCH RBC QN AUTO: 25.9 PG (ref 27–33.4)
MCHC RBC AUTO-ENTMCNC: 32 G/DL (ref 32–36)
MCV RBC AUTO: 81 FL (ref 80–97)
MONOCYTES % (MANUAL): 2 % (ref 3–13)
NEUTS BAND NFR BLD MANUAL: 1 % (ref 3–5)
OVALOCYTES BLD QL SMEAR: SLIGHT
PLATELET # BLD: 242 10^3/UL (ref 150–450)
PLATELET COMMENT: ADEQUATE
POIKILOCYTOSIS BLD QL SMEAR: SLIGHT
POTASSIUM SERPL-SCNC: 4.6 MMOL/L (ref 3.6–5)
PROT SERPL-MCNC: 5.8 G/DL (ref 6.3–8.2)
RBC # BLD AUTO: 4.41 10^6/UL (ref 4.35–5.55)
SCHISTOCYTES BLD QL SMEAR: SLIGHT
SEGMENTED NEUTROPHILS % (MAN): 95 % (ref 42–78)
TOTAL CELLS COUNTED BLD: 100
TOXIC GRANULES BLD QL SMEAR: SLIGHT
VARIANT LYMPHS NFR BLD MANUAL: 2 % (ref 13–45)
WBC # BLD AUTO: 12.7 10^3/UL (ref 4–10.5)

## 2020-09-24 RX ADMIN — OXYCODONE AND ACETAMINOPHEN PRN TAB: 5; 325 TABLET ORAL at 08:29

## 2020-09-24 RX ADMIN — CETIRIZINE HYDROCHLORIDE SCH MG: 10 TABLET, FILM COATED ORAL at 09:34

## 2020-09-24 RX ADMIN — LORAZEPAM PRN MG: 2 INJECTION INTRAMUSCULAR; INTRAVENOUS at 02:26

## 2020-09-24 RX ADMIN — CEFTRIAXONE SCH MLS/HR: 1 INJECTION, SOLUTION INTRAVENOUS at 09:35

## 2020-09-24 RX ADMIN — FLUTICASONE FUROATE AND VILANTEROL TRIFENATATE SCH INH: 200; 25 POWDER RESPIRATORY (INHALATION) at 09:35

## 2020-09-24 RX ADMIN — ENOXAPARIN SODIUM SCH: 40 INJECTION SUBCUTANEOUS at 09:35

## 2020-09-24 RX ADMIN — SODIUM CHLORIDE PRN MLS/HR: 9 INJECTION, SOLUTION INTRAVENOUS at 06:22

## 2020-09-24 RX ADMIN — UMECLIDINIUM SCH INH: 62.5 AEROSOL, POWDER ORAL at 09:35

## 2020-09-24 RX ADMIN — METHYLPREDNISOLONE SODIUM SUCCINATE SCH MG: 125 INJECTION, POWDER, FOR SOLUTION INTRAMUSCULAR; INTRAVENOUS at 05:21

## 2020-09-24 RX ADMIN — LORAZEPAM PRN MG: 2 INJECTION INTRAMUSCULAR; INTRAVENOUS at 06:44

## 2020-09-24 RX ADMIN — DOCUSATE SODIUM SCH: 100 CAPSULE, LIQUID FILLED ORAL at 09:29

## 2020-09-24 RX ADMIN — Medication SCH: at 07:19

## 2020-09-24 NOTE — LEFT AGAINST MEDICAL ADVICE
Against Medical Advice


Admission Date/Time: 09/22/20 10:10





 Primary Care Provider: BABAK FLORES MD








Date of Patient Emigration: 09/24/20





- Diagnosis:


(1) Acute severe exacerbation of asthma


Is this a current diagnosis for this admission?: Yes   





(2) Acute respiratory failure with hypoxia


Is this a current diagnosis for this admission?: Yes   





- Summary:


Summary: 


Please see Admission and Progress Notes as well.





CHRISTY JEFFERSON is a 40 M, who LEFT AGAINST MEDICAL ADVICE.





The Patient was admitted on 09/22/20 10:10.








[id number] male with history of bronchial asthma admitted with severe

shortness of breath requiring BiPAP in the emergency room.  COVID-19 is 

negative.  Started on IV Solu-Medrol, DuoNeb ligations and antibiotic therapy.  

No acute events in the hospital.  Patient looks drowsy this morning.  There is a

question of patient taking his own medications at that time.  Patient got upset 

after questioning decided to sign AMA left the hospital.  I did convince him to 

stay in the hospital for further management he understood and verbalized the 

response of leaving the hospital and the risks involved still signed AMA papers 

and left the hospital.

## 2020-10-01 ENCOUNTER — HOSPITAL ENCOUNTER (EMERGENCY)
Dept: HOSPITAL 62 - ER | Age: 40
Discharge: LEFT BEFORE DISCHARGE | End: 2020-10-01
Payer: MEDICARE

## 2020-10-01 VITALS — DIASTOLIC BLOOD PRESSURE: 65 MMHG | SYSTOLIC BLOOD PRESSURE: 104 MMHG

## 2020-10-01 DIAGNOSIS — Z53.21: Primary | ICD-10-CM
